# Patient Record
Sex: MALE | Race: WHITE | NOT HISPANIC OR LATINO | URBAN - NONMETROPOLITAN AREA
[De-identification: names, ages, dates, MRNs, and addresses within clinical notes are randomized per-mention and may not be internally consistent; named-entity substitution may affect disease eponyms.]

---

## 2017-11-11 ENCOUNTER — HOSPITAL ENCOUNTER (OUTPATIENT)
Dept: LAB | Facility: MEDICAL CENTER | Age: 67
End: 2017-11-11
Attending: INTERNAL MEDICINE
Payer: MEDICARE

## 2017-11-11 LAB
ALBUMIN SERPL BCP-MCNC: 4 G/DL (ref 3.2–4.9)
ALBUMIN/GLOB SERPL: 1.5 G/DL
ALP SERPL-CCNC: 45 U/L (ref 30–99)
ALT SERPL-CCNC: 18 U/L (ref 2–50)
ANION GAP SERPL CALC-SCNC: 7 MMOL/L (ref 0–11.9)
AST SERPL-CCNC: 17 U/L (ref 12–45)
BILIRUB SERPL-MCNC: 0.5 MG/DL (ref 0.1–1.5)
BUN SERPL-MCNC: 19 MG/DL (ref 8–22)
CALCIUM SERPL-MCNC: 8.8 MG/DL (ref 8.5–10.5)
CHLORIDE SERPL-SCNC: 106 MMOL/L (ref 96–112)
CHOLEST SERPL-MCNC: 173 MG/DL (ref 100–199)
CO2 SERPL-SCNC: 26 MMOL/L (ref 20–33)
CREAT SERPL-MCNC: 0.88 MG/DL (ref 0.5–1.4)
GFR SERPL CREATININE-BSD FRML MDRD: >60 ML/MIN/1.73 M 2
GLOBULIN SER CALC-MCNC: 2.6 G/DL (ref 1.9–3.5)
GLUCOSE SERPL-MCNC: 105 MG/DL (ref 65–99)
HCV AB SER QL: NEGATIVE
HDLC SERPL-MCNC: 63 MG/DL
LDLC SERPL CALC-MCNC: 80 MG/DL
POTASSIUM SERPL-SCNC: 4.1 MMOL/L (ref 3.6–5.5)
PROT SERPL-MCNC: 6.6 G/DL (ref 6–8.2)
SODIUM SERPL-SCNC: 139 MMOL/L (ref 135–145)
TRIGL SERPL-MCNC: 149 MG/DL (ref 0–149)

## 2017-11-11 PROCEDURE — 80053 COMPREHEN METABOLIC PANEL: CPT

## 2017-11-11 PROCEDURE — 36415 COLL VENOUS BLD VENIPUNCTURE: CPT

## 2017-11-11 PROCEDURE — 86803 HEPATITIS C AB TEST: CPT

## 2017-11-11 PROCEDURE — 80061 LIPID PANEL: CPT

## 2018-08-07 ENCOUNTER — APPOINTMENT (OUTPATIENT)
Dept: RADIOLOGY | Facility: IMAGING CENTER | Age: 68
End: 2018-08-07
Attending: ORTHOPAEDIC SURGERY
Payer: MEDICARE

## 2018-08-07 ENCOUNTER — NON-PROVIDER VISIT (OUTPATIENT)
Dept: URGENT CARE | Facility: PHYSICIAN GROUP | Age: 68
End: 2018-08-07
Payer: MEDICARE

## 2018-08-07 ENCOUNTER — HOSPITAL ENCOUNTER (OUTPATIENT)
Dept: LAB | Facility: MEDICAL CENTER | Age: 68
End: 2018-08-07
Attending: ORTHOPAEDIC SURGERY
Payer: MEDICARE

## 2018-08-07 DIAGNOSIS — M54.5 LOW BACK PAIN, UNSPECIFIED BACK PAIN LATERALITY, UNSPECIFIED CHRONICITY, WITH SCIATICA PRESENCE UNSPECIFIED: ICD-10-CM

## 2018-08-07 LAB
APPEARANCE UR: CLEAR
APTT PPP: 26.2 SEC (ref 24.7–36)
BILIRUB UR QL STRIP.AUTO: NEGATIVE
COLOR UR: YELLOW
GLUCOSE UR STRIP.AUTO-MCNC: NEGATIVE MG/DL
INR PPP: 0.99 (ref 0.87–1.13)
KETONES UR STRIP.AUTO-MCNC: NEGATIVE MG/DL
LEUKOCYTE ESTERASE UR QL STRIP.AUTO: NEGATIVE
MICRO URNS: NORMAL
NITRITE UR QL STRIP.AUTO: NEGATIVE
PH UR STRIP.AUTO: 5.5 [PH]
PROT UR QL STRIP: NEGATIVE MG/DL
PROTHROMBIN TIME: 12.8 SEC (ref 12–14.6)
RBC UR QL AUTO: NEGATIVE
SP GR UR STRIP.AUTO: 1.02
UROBILINOGEN UR STRIP.AUTO-MCNC: 0.2 MG/DL

## 2018-08-07 PROCEDURE — 36415 COLL VENOUS BLD VENIPUNCTURE: CPT | Mod: GA

## 2018-08-07 PROCEDURE — 81003 URINALYSIS AUTO W/O SCOPE: CPT

## 2018-08-07 PROCEDURE — 85610 PROTHROMBIN TIME: CPT | Mod: GA

## 2018-08-07 PROCEDURE — 85730 THROMBOPLASTIN TIME PARTIAL: CPT | Mod: GA

## 2018-08-07 PROCEDURE — 71046 X-RAY EXAM CHEST 2 VIEWS: CPT | Mod: TC,FY | Performed by: NURSE PRACTITIONER

## 2018-08-20 ENCOUNTER — APPOINTMENT (OUTPATIENT)
Dept: ADMISSIONS | Facility: MEDICAL CENTER | Age: 68
End: 2018-08-20
Attending: ORTHOPAEDIC SURGERY
Payer: MEDICARE

## 2018-08-20 RX ORDER — SIMVASTATIN 40 MG
40 TABLET ORAL NIGHTLY
COMMUNITY
End: 2022-11-14 | Stop reason: SDUPTHER

## 2018-08-20 RX ORDER — LISINOPRIL 40 MG/1
40 TABLET ORAL EVERY EVENING
COMMUNITY
End: 2022-09-13

## 2018-08-20 RX ORDER — DOXAZOSIN MESYLATE 4 MG/1
4 TABLET ORAL EVERY EVENING
COMMUNITY
End: 2022-11-14 | Stop reason: SDUPTHER

## 2018-08-21 ENCOUNTER — APPOINTMENT (OUTPATIENT)
Dept: RADIOLOGY | Facility: MEDICAL CENTER | Age: 68
End: 2018-08-21
Attending: ORTHOPAEDIC SURGERY
Payer: MEDICARE

## 2018-08-21 ENCOUNTER — HOSPITAL ENCOUNTER (OUTPATIENT)
Facility: MEDICAL CENTER | Age: 68
End: 2018-08-21
Attending: ORTHOPAEDIC SURGERY | Admitting: ORTHOPAEDIC SURGERY
Payer: MEDICARE

## 2018-08-21 VITALS
BODY MASS INDEX: 28.55 KG/M2 | RESPIRATION RATE: 14 BRPM | WEIGHT: 222.44 LBS | HEART RATE: 69 BPM | TEMPERATURE: 97.9 F | HEIGHT: 74 IN | OXYGEN SATURATION: 92 %

## 2018-08-21 PROCEDURE — 500367 HCHG DRAIN KIT, HEMOVAC: Performed by: ORTHOPAEDIC SURGERY

## 2018-08-21 PROCEDURE — 500440 HCHG DRESSING, STERILE ROLL (KERLIX): Performed by: ORTHOPAEDIC SURGERY

## 2018-08-21 PROCEDURE — 160025 RECOVERY II MINUTES (STATS): Performed by: ORTHOPAEDIC SURGERY

## 2018-08-21 PROCEDURE — 500885 HCHG PACK, JACKSON TABLE: Performed by: ORTHOPAEDIC SURGERY

## 2018-08-21 PROCEDURE — 160002 HCHG RECOVERY MINUTES (STAT): Performed by: ORTHOPAEDIC SURGERY

## 2018-08-21 PROCEDURE — 700101 HCHG RX REV CODE 250

## 2018-08-21 PROCEDURE — A9270 NON-COVERED ITEM OR SERVICE: HCPCS

## 2018-08-21 PROCEDURE — 501838 HCHG SUTURE GENERAL: Performed by: ORTHOPAEDIC SURGERY

## 2018-08-21 PROCEDURE — 160029 HCHG SURGERY MINUTES - 1ST 30 MINS LEVEL 4: Performed by: ORTHOPAEDIC SURGERY

## 2018-08-21 PROCEDURE — 700102 HCHG RX REV CODE 250 W/ 637 OVERRIDE(OP)

## 2018-08-21 PROCEDURE — 160036 HCHG PACU - EA ADDL 30 MINS PHASE I: Performed by: ORTHOPAEDIC SURGERY

## 2018-08-21 PROCEDURE — 160047 HCHG PACU  - EA ADDL 30 MINS PHASE II: Performed by: ORTHOPAEDIC SURGERY

## 2018-08-21 PROCEDURE — A6402 STERILE GAUZE <= 16 SQ IN: HCPCS | Performed by: ORTHOPAEDIC SURGERY

## 2018-08-21 PROCEDURE — 72020 X-RAY EXAM OF SPINE 1 VIEW: CPT

## 2018-08-21 PROCEDURE — 500424 HCHG DRESSING, AIRSTRIP: Performed by: ORTHOPAEDIC SURGERY

## 2018-08-21 PROCEDURE — 160046 HCHG PACU - 1ST 60 MINS PHASE II: Performed by: ORTHOPAEDIC SURGERY

## 2018-08-21 PROCEDURE — 502240 HCHG MISC OR SUPPLY RC 0272: Performed by: ORTHOPAEDIC SURGERY

## 2018-08-21 PROCEDURE — 160041 HCHG SURGERY MINUTES - EA ADDL 1 MIN LEVEL 4: Performed by: ORTHOPAEDIC SURGERY

## 2018-08-21 PROCEDURE — 700111 HCHG RX REV CODE 636 W/ 250 OVERRIDE (IP)

## 2018-08-21 PROCEDURE — 110454 HCHG SHELL REV 250: Performed by: ORTHOPAEDIC SURGERY

## 2018-08-21 PROCEDURE — 160048 HCHG OR STATISTICAL LEVEL 1-5: Performed by: ORTHOPAEDIC SURGERY

## 2018-08-21 PROCEDURE — 160035 HCHG PACU - 1ST 60 MINS PHASE I: Performed by: ORTHOPAEDIC SURGERY

## 2018-08-21 PROCEDURE — 160009 HCHG ANES TIME/MIN: Performed by: ORTHOPAEDIC SURGERY

## 2018-08-21 RX ORDER — OXYCODONE HCL 5 MG/5 ML
SOLUTION, ORAL ORAL
Status: COMPLETED
Start: 2018-08-21 | End: 2018-08-21

## 2018-08-21 RX ORDER — LIDOCAINE HYDROCHLORIDE 10 MG/ML
0.5 INJECTION, SOLUTION INFILTRATION; PERINEURAL
Status: DISCONTINUED | OUTPATIENT
Start: 2018-08-21 | End: 2018-08-21 | Stop reason: HOSPADM

## 2018-08-21 RX ORDER — ACETAMINOPHEN 500 MG
TABLET ORAL
Status: COMPLETED
Start: 2018-08-21 | End: 2018-08-21

## 2018-08-21 RX ORDER — CELECOXIB 200 MG/1
CAPSULE ORAL
Status: COMPLETED
Start: 2018-08-21 | End: 2018-08-21

## 2018-08-21 RX ORDER — LIDOCAINE HYDROCHLORIDE 10 MG/ML
INJECTION, SOLUTION INFILTRATION; PERINEURAL
Status: COMPLETED
Start: 2018-08-21 | End: 2018-08-21

## 2018-08-21 RX ORDER — BUPIVACAINE HYDROCHLORIDE AND EPINEPHRINE 2.5; 5 MG/ML; UG/ML
INJECTION, SOLUTION EPIDURAL; INFILTRATION; INTRACAUDAL; PERINEURAL
Status: DISCONTINUED | OUTPATIENT
Start: 2018-08-21 | End: 2018-08-21 | Stop reason: HOSPADM

## 2018-08-21 RX ORDER — SODIUM CHLORIDE, SODIUM LACTATE, POTASSIUM CHLORIDE, CALCIUM CHLORIDE 600; 310; 30; 20 MG/100ML; MG/100ML; MG/100ML; MG/100ML
INJECTION, SOLUTION INTRAVENOUS CONTINUOUS
Status: DISCONTINUED | OUTPATIENT
Start: 2018-08-21 | End: 2018-08-21 | Stop reason: HOSPADM

## 2018-08-21 RX ADMIN — LIDOCAINE HYDROCHLORIDE 0.5 ML: 10 INJECTION, SOLUTION INFILTRATION; PERINEURAL at 09:19

## 2018-08-21 RX ADMIN — ACETAMINOPHEN 1000 MG: 500 TABLET, FILM COATED ORAL at 09:19

## 2018-08-21 RX ADMIN — SODIUM CHLORIDE, SODIUM LACTATE, POTASSIUM CHLORIDE, CALCIUM CHLORIDE: 600; 310; 30; 20 INJECTION, SOLUTION INTRAVENOUS at 09:19

## 2018-08-21 RX ADMIN — CELECOXIB 400 MG: 200 CAPSULE ORAL at 09:19

## 2018-08-21 RX ADMIN — OXYCODONE HYDROCHLORIDE 10 MG: 5 SOLUTION ORAL at 12:29

## 2018-08-21 ASSESSMENT — PAIN SCALES - GENERAL
PAINLEVEL_OUTOF10: 3
PAINLEVEL_OUTOF10: 3
PAINLEVEL_OUTOF10: 6
PAINLEVEL_OUTOF10: 3
PAINLEVEL_OUTOF10: 4
PAINLEVEL_OUTOF10: 0
PAINLEVEL_OUTOF10: 6
PAINLEVEL_OUTOF10: 0

## 2018-08-21 NOTE — OR NURSING
Dressing reinforced. Drainage saturating thru reinforced dressing and onto the bed linens. Spoke with Dr Starr via phone, he orderd a dressing change. Dressing changed. Pt up to bathroom and ambulating well. Pt void x 1. Bleeding stopped.

## 2018-08-21 NOTE — OP REPORT
DATE OF SERVICE:  08/21/2018    PREOPERATIVE DIAGNOSES:  Stenosis with radiculopathy at L3, stenosis with   radiculopathy at L4.    POSTOPERATIVE DIAGNOSES:  Stenosis with radiculopathy at L3, stenosis with   radiculopathy at L4.    PROCEDURES PERFORMED:  Laminectomy and facetectomy at L3; laminectomy and   facetectomy at L4.    SURGEON:  Laxmi Starr MD    ASSISTANT:  ELIZABETH Sibley    OPERATIVE PROCEDURE:  The patient was taken to operating room where general   anesthesia was administered by mask.  The patient was well anesthetized,   endotracheally intubated, placed prone on the OSI table.  Great care was taken   to make sure there was no pressure on the orbits.  The arms were not abducted   past 90 degrees.  The marked interspace spinous process of L3 pointed towards   the L3-L4 disk.  Needle was curved subcutaneously.  Lumbar spine was prepped   and draped in the usual sterile fashion.  Ancef administered intravenously   prior to skin incision.  Skin was incised after lidocaine over the L3-L4 area   to expose the fascia.  Further lidocaine injected both right and left sides.    Using Astorga elevator and electrocautery, the rectal muscle was dissected off   the laminas of L3-L4.  Intraoperative x-ray confirmed the appropriate level.    The spinous process of L3, partially of L4, partially of L2 was harvested and   was removed.  The lamina was thinned with a high-speed bur, then using the   Misonix bone scalpel, the lamina was removed off the medial wall and cephalad   of the L3 pedicle, medial and inferior wall of the L4 pedicle.  Decompression   was carried out with 3 and 4 mm Kerrisons.  The wound was copiously irrigated.    Vancomycin powder placed in the wound for local antibiotic control.  The   fascia was reapproximated with 0 Vicryl, subQ with 2-0 Vicryl, skin closed   with 4-0 Vicryl.  Prior to closure, the wound was infiltrated with Marcaine.    The patient tolerated the procedure well.  The patient  was taken to recovery   room in stable condition.  Counts reported as correct.       ____________________________________     MD MIMI PRIEST / YOVANI    DD:  08/21/2018 11:59:25  DT:  08/21/2018 12:19:18    D#:  [id number]  Job#:  [id number]    cc: MIKAYLA JOHNSON

## 2018-08-21 NOTE — OR SURGEON
Immediate Post OP Note    PreOp Diagnosis: stenosis L3-4  PostOp Diagnosis: same    Procedure(s):  LUMBAR DECOMPRESSION- POSTERIOR L3-4 - Wound Class: Clean    Surgeon(s):  Laxmi Starr M.D.    Anesthesiologist/Type of Anesthesia:  Anesthesiologist: Morgan Wylie/General    Surgical Staff:  Circulator: Jada Leach R.N.  Relief Circulator: Iza Chung R.N.  Relief Scrub: Johnathon Geller  Scrub Person: Adali Tran    Specimens removed if any none    Estimated Blood Loss: 30cc    Findings: stenosis    Complications: none        8/21/2018 12:07 PM Laxmi Starr M.D.

## 2018-08-21 NOTE — DISCHARGE INSTRUCTIONS
ACTIVITY: Rest and take it easy for the first 24 hours.  A responsible adult is recommended to remain with you during that time.  It is normal to feel sleepy.  We encourage you to not do anything that requires balance, judgment or coordination.    MILD FLU-LIKE SYMPTOMS ARE NORMAL. YOU MAY EXPERIENCE GENERALIZED MUSCLE ACHES, THROAT IRRITATION, HEADACHE AND/OR SOME NAUSEA.    FOR 24 HOURS DO NOT:  Drive, operate machinery or run household appliances.  Drink beer or alcoholic beverages.   Make important decisions or sign legal documents.    SPECIAL INSTRUCTIONS:   May shower with waterproof dressing on. Remove dressing 4 days postoperatively.  Follow up with Dr. Starr as scheduled.    Surgical Spinal Decompression, Care After  Introduction  Refer to this sheet in the next few weeks. These instructions provide you with information about caring for yourself after your procedure. Your health care provider may also give you more specific instructions. Your treatment has been planned according to current medical practices, but problems sometimes occur. Call your health care provider if you have any problems or questions after your procedure.  What can I expect after the procedure?  It is common to have pain for the first few days after the procedure. Some people continue to have mild pain even after making a full recovery.  Follow these instructions at home:  Medicine  · Take medicines only as directed by your health care provider.  · Avoid taking over-the-counter pain medicines unless your health care provider tells you otherwise. These medicines interfere with the development and growth of new bone cells.  · If you were prescribed a narcotic pain medicine, take it exactly as told by your health care provider.  ¨ Do not drink alcohol while on the medicine.  ¨ Do not drive while on the medicine.  Injury care  · Care for your back brace as told by your health care provider.  · If directed, apply ice to the injured  area:  ¨ Put ice in a plastic bag.  ¨ Place a towel between your skin and the bag.  ¨ Leave the ice on for 20 minutes, 2-3 times a day.  Activity  · Perform physical therapy exercises as told by your health care provider.  · Exercise regularly. Start by taking short walks. Slowly increase your activity level over time. Gentle exercise helps to ease pain.  · Sit, stand, walk, turn in bed, and reposition yourself as told by your health care provider. This will help to keep your spine in proper alignment.  · Avoid bending and twisting your body.  · Avoid doing strenuous household chores, such as vacuuming.  · Do not lift anything that is heavier than 10 lb (4.5 kg).  Other Instructions  · Keep all follow-up visits as directed by your health care provider. This is important.  · Do not use any tobacco products, including cigarettes, chewing tobacco, or electronic cigarettes. If you need help quitting, ask your health care provider. Nicotine affects the way bones heal.  Contact a health care provider if:  · Your pain gets worse.  · You have a fever.  · You have redness, swelling, or pain at the site of your incision.  · You have fluid, blood, or pus coming from your incision.  · You have numbness, tingling, or weakness in any part of your body.  Get help right away if:  · Your incision feels swollen and tender, and the surrounding area looks like a lump. The lump may be red or bluish in color.  · You cannot move any part of your body (paralysis).  · You cannot control your bladder or bowels.  This information is not intended to replace advice given to you by your health care provider. Make sure you discuss any questions you have with your health care provider.  Document Released: 05/03/2016 Document Revised: 05/25/2017 Document Reviewed: 12/21/2015  © 2017 Elsevier      DIET: To avoid nausea, slowly advance diet as tolerated, avoiding spicy or greasy foods for the first day.  Add more substantial food to your diet  according to your physician's instructions  INCREASE FLUIDS AND FIBER TO AVOID CONSTIPATION.    SURGICAL DRESSING/BATHING:   Okay to shower. No hot tubs, baths, soaking until cleared by doctor.    FOLLOW-UP APPOINTMENT:  A follow-up appointment should be arranged with your doctor in 1-2 weeks; call to schedule.    You should CALL YOUR PHYSICIAN if you develop:  Fever greater than 101 degrees F.  Pain not relieved by medication, or persistent nausea or vomiting.  Excessive bleeding (blood soaking through dressing) or unexpected drainage from the wound.  Extreme redness or swelling around the incision site, drainage of pus or foul smelling drainage.  Inability to urinate or empty your bladder within 8 hours.  Problems with breathing or chest pain.    You should call 911 if you develop problems with breathing or chest pain.  If you are unable to contact your doctor or surgical center, you should go to the nearest emergency room or urgent care center.  Physician's telephone #: Dr. Starr [phone]    If any questions arise, call your doctor.  If your doctor is not available, please feel free to call the Surgical Center at [phone].  The Center is open Monday through Friday from 7AM to 7PM.  You can also call the [email or web address] HOTLINE open 24 hours/day, 7 days/week and speak to a nurse at [phone], or toll free at [phone].    A registered nurse may call you a few days after your surgery to see how you are doing after your procedure.    MEDICATIONS: Resume taking daily medication.  Take prescribed pain medication with food.  If no medication is prescribed, you may take non-aspirin pain medication if needed.  PAIN MEDICATION CAN BE VERY CONSTIPATING.  Take a stool softener or laxative such as senokot, pericolace, or milk of magnesia if needed.    Prescription given for Norco (pain medication), Zofran (anti-nausea medication), Keflex (antibiotic).  Last pain medication given at 12:29 pm (next dose available  __________.)    If your physician has prescribed pain medication that includes Acetaminophen (Tylenol), do not take additional Acetaminophen (Tylenol) while taking the prescribed medication.    Depression / Suicide Risk    As you are discharged from this [facility] facility, it is important to learn how to keep safe from harming yourself.    Recognize the warning signs:  · Abrupt changes in personality, positive or negative- including increase in energy   · Giving away possessions  · Change in eating patterns- significant weight changes-  positive or negative  · Change in sleeping patterns- unable to sleep or sleeping all the time   · Unwillingness or inability to communicate  · Depression  · Unusual sadness, discouragement and loneliness  · Talk of wanting to die  · Neglect of personal appearance   · Rebelliousness- reckless behavior  · Withdrawal from people/activities they love  · Confusion- inability to concentrate     If you or a loved one observes any of these behaviors or has concerns about self-harm, here's what you can do:  · Talk about it- your feelings and reasons for harming yourself  · Remove any means that you might use to hurt yourself (examples: pills, rope, extension cords, firearm)  · Get professional help from the community (Mental Health, Substance Abuse, psychological counseling)  · Do not be alone:Call your Safe Contact- someone whom you trust who will be there for you.  · Call your local CRISIS HOTLINE 113-9796 or 489-613-4302  · Call your local Children's Mobile Crisis Response Team [place] (946) 584-3033 or www.PROnoise  · Call the toll free National Suicide Prevention Hotlines   · National Suicide Prevention Lifeline 154-924-UDEV (1126)  · National Hope Line Network 800-SUICIDE (402-9803)

## 2018-08-21 NOTE — OP REPORT
DATE OF SERVICE:  08/21/2018    PREOPERATIVE DIAGNOSES:  Stenosis with radiculopathy at L3 and stenosis with   radiculopathy at L3.    POSTOPERATIVE DIAGNOSIS:  Stenosis with radiculopathy at L3 and stenosis with   radiculopathy at L3.    PROCEDURES PERFORMED:  Laminectomy and facetectomy at L3; laminectomy and   facetectomy at L4.    SURGEON:  Laxmi Starr MD    ASSISTANT:  ELIZABETH Sibley    OPERATIVE PROCEDURE:  The patient was taken to the operating room where   general anesthesia was administered by mask.  The patient was well   anesthetized, endotracheally intubated, placed prone on the OSI table.  Great   care was taken to make sure there was no pressure on the orbits.  The arms   were not abducted past 90 degrees.  The elbows gently flexed with no pressure   on the ulnar nerve.  The lumbar spine was prepped initially with ChloraPrep   and marked interspace spinous process of L3 towards the L3-L4 disk.  The   needle was curved subcutaneously using a guide for the incision.  The lumbar   spine was then prepped and draped in the usual sterile fashion.  Ancef was   administered intravenously prior to skin incision.  Skin was incised after   infiltrating lidocaine over the L3-L4 area to expose the fascia.  Further   lidocaine injected on both the right and left sides.  Using Astorga elevator and   electrocautery, a subperiosteal dissection of rectus spinal muscles at laminas   of L3 and L4 was performed.  Intraoperative x-ray confirmed the appropriate   level.  ____ was removed.  The spinous process of L3, partially of L4, and L2   was removed.    DICTATION ENDS HERE       ____________________________________     MD MIMI PRIEST / YOVANI    DD:  08/21/2018 11:55:18  DT:  08/21/2018 12:11:42    D#:  [id number]  Job#:  [id number]    cc: MIKAYLA JOHNSON

## 2018-08-21 NOTE — PROGRESS NOTES
The Medication Reconciliation process has been completed by interviewing the patient    Allergies have been reviewed  Antibiotic use in 30 days - none    Home Pharmacy:  [facility]

## 2018-08-21 NOTE — OR NURSING
1205: received to PACU via gurney. Awake. Denies any pain or any numbness to all extremities. Able to move all extremities. Lumbar dressing CDI.  1229: c/o pain. Pain scale 6/10. Medicated. See MAR. Sips of water given. Tolerated well.  1300: pain scale 3/10 and tolerable.  1320: report called to Anabela ROSE  1322: transported via gurney to PACU 2. Stable.

## 2018-08-21 NOTE — OR NURSING
Discharge information reviewed with patient and responsible adult. No questions or concerns at this time.     IV discontinued. Dressing CDI.     See vital sign flowsheets  for discharge details.

## 2019-06-18 ENCOUNTER — HOSPITAL ENCOUNTER (OUTPATIENT)
Dept: LAB | Facility: MEDICAL CENTER | Age: 69
End: 2019-06-18
Attending: NURSE PRACTITIONER
Payer: MEDICARE

## 2019-06-18 LAB
ALBUMIN SERPL BCP-MCNC: 4.1 G/DL (ref 3.2–4.9)
ALBUMIN/GLOB SERPL: 1.9 G/DL
ALP SERPL-CCNC: 48 U/L (ref 30–99)
ALT SERPL-CCNC: 18 U/L (ref 2–50)
ANION GAP SERPL CALC-SCNC: 10 MMOL/L (ref 0–11.9)
AST SERPL-CCNC: 20 U/L (ref 12–45)
BILIRUB SERPL-MCNC: 0.6 MG/DL (ref 0.1–1.5)
BUN SERPL-MCNC: 10 MG/DL (ref 8–22)
CALCIUM SERPL-MCNC: 9.3 MG/DL (ref 8.5–10.5)
CHLORIDE SERPL-SCNC: 106 MMOL/L (ref 96–112)
CHOLEST SERPL-MCNC: 224 MG/DL (ref 100–199)
CO2 SERPL-SCNC: 25 MMOL/L (ref 20–33)
CREAT SERPL-MCNC: 0.94 MG/DL (ref 0.5–1.4)
FASTING STATUS PATIENT QL REPORTED: NORMAL
GLOBULIN SER CALC-MCNC: 2.2 G/DL (ref 1.9–3.5)
GLUCOSE SERPL-MCNC: 98 MG/DL (ref 65–99)
HDLC SERPL-MCNC: 52 MG/DL
LDLC SERPL CALC-MCNC: 119 MG/DL
POTASSIUM SERPL-SCNC: 4.5 MMOL/L (ref 3.6–5.5)
PROT SERPL-MCNC: 6.3 G/DL (ref 6–8.2)
SODIUM SERPL-SCNC: 141 MMOL/L (ref 135–145)
TRIGL SERPL-MCNC: 264 MG/DL (ref 0–149)

## 2019-06-18 PROCEDURE — 80053 COMPREHEN METABOLIC PANEL: CPT

## 2019-06-18 PROCEDURE — 80061 LIPID PANEL: CPT

## 2019-06-18 PROCEDURE — 36415 COLL VENOUS BLD VENIPUNCTURE: CPT

## 2019-11-26 ENCOUNTER — HOSPITAL ENCOUNTER (OUTPATIENT)
Dept: LAB | Facility: MEDICAL CENTER | Age: 69
End: 2019-11-26
Attending: INTERNAL MEDICINE
Payer: MEDICARE

## 2019-11-26 LAB
CHOLEST SERPL-MCNC: 267 MG/DL (ref 100–199)
FASTING STATUS PATIENT QL REPORTED: NORMAL
HDLC SERPL-MCNC: 54 MG/DL
LDLC SERPL CALC-MCNC: 147 MG/DL
TRIGL SERPL-MCNC: 332 MG/DL (ref 0–149)

## 2019-11-26 PROCEDURE — 80061 LIPID PANEL: CPT

## 2019-11-26 PROCEDURE — 36415 COLL VENOUS BLD VENIPUNCTURE: CPT

## 2020-04-07 NOTE — OR NURSING
[facility]                        OCCUPATIONAL HEALTH SERVICES                       OCCUPATIONAL MEDICINE REPORT    EMPLOYEE NAME: Alo Sommer            YOB: 1987  MED. REC. #: [id number]             DATE OF VISIT: 4/7/2020   DATE OF INJURY:   EMPLOYER:   PROVIDER: Dr. Hayder Aguilar MD    EMPLOYEE STATEMENT:  The patient is here for follow-up regarding Laceration of forehead     HISTORY:  Patient was seen in the immediate care yesterday after an object struck him in the forehead causing a wound.  Dermabond was placed on the wound and he had a tetanus booster.  He indicates he is having no problems with the wound at all.  He is having no pain whatsoever.  He denies headache or neck pain.  He is otherwise well.    SIGNIFICANT FINDINGS:  Visit Vitals  /78   Pulse 69     Patient is well developed, well nourished, awake, alert, and does not appear in any distress.  HEENT: PERRLA, EOM's intact, the wound appears to be healing without evidence of infection.  There is no erythema, warmth, swelling, or discharge.  Neck: supple and non-tender  Lungs: clear and equal, no wheezes, rales, or rhonchi noted  Heart: regular rate and rhythm, chest wall not tender    DIAGNOSIS:   1. Laceration of forehead, subsequent encounter        CONDITION: Healing without evidence of infection.    TREATMENT AND PLAN:  MEDICATION: No medications given this visit.  PHYSICAL THERAPY:   PROCEDURE:   OTHER:     WORK STATUS: Return to full unrestricted duty.    NEXT APPOINTMENT: None    DISABILITY: None    EMPLOYER:  The above medical report is a summary of your employee's recent clinic visit at [facility].  If you have any questions concerning the treatment of your employee, please contact us at [phone].     Report given to MILTON Shah. Patient resting in bed with wife at bedside. Instructed to use call light if he needs any assistance. Pain level tolerable.

## 2020-10-04 ENCOUNTER — HOSPITAL ENCOUNTER (OUTPATIENT)
Facility: MEDICAL CENTER | Age: 70
End: 2020-10-04
Attending: PHYSICIAN ASSISTANT
Payer: MEDICARE

## 2020-10-04 ENCOUNTER — OFFICE VISIT (OUTPATIENT)
Dept: URGENT CARE | Facility: PHYSICIAN GROUP | Age: 70
End: 2020-10-04
Payer: MEDICARE

## 2020-10-04 VITALS
WEIGHT: 219 LBS | HEIGHT: 74 IN | TEMPERATURE: 97.6 F | HEART RATE: 68 BPM | RESPIRATION RATE: 16 BRPM | DIASTOLIC BLOOD PRESSURE: 78 MMHG | SYSTOLIC BLOOD PRESSURE: 122 MMHG | OXYGEN SATURATION: 98 % | BODY MASS INDEX: 28.11 KG/M2

## 2020-10-04 DIAGNOSIS — Z20.822 EXPOSURE TO COVID-19 VIRUS: ICD-10-CM

## 2020-10-04 PROCEDURE — U0003 INFECTIOUS AGENT DETECTION BY NUCLEIC ACID (DNA OR RNA); SEVERE ACUTE RESPIRATORY SYNDROME CORONAVIRUS 2 (SARS-COV-2) (CORONAVIRUS DISEASE [COVID-19]), AMPLIFIED PROBE TECHNIQUE, MAKING USE OF HIGH THROUGHPUT TECHNOLOGIES AS DESCRIBED BY CMS-2020-01-R: HCPCS

## 2020-10-04 PROCEDURE — 99203 OFFICE O/P NEW LOW 30 MIN: CPT | Mod: CS | Performed by: PHYSICIAN ASSISTANT

## 2020-10-04 NOTE — PROGRESS NOTES
Chief Complaint   Patient presents with   • Other     COVID test        HISTORY OF PRESENT ILLNESS: Patient is a 69 y.o. male who presents today for the following:    Patient comes in for evaluation of possible COVID.  His wife developed a fever yesterday and he would like to be tested as well.  He denies fever, body aches, chills, cough, shortness of breath.    There are no active problems to display for this patient.      Allergies:Patient has no known allergies.    Current Outpatient Medications Ordered in Epic   Medication Sig Dispense Refill   • mupirocin (BACTROBAN) 2 % Ointment Apply 1 Application to affected area(s) 2 times a day. For 5 days     • lisinopril (PRINIVIL, ZESTRIL) 40 MG tablet Take 40 mg by mouth every evening.     • simvastatin (ZOCOR) 40 MG Tab Take 40 mg by mouth every evening.     • doxazosin (CARDURA) 4 MG Tab Take 4 mg by mouth every evening.       No current Epic-ordered facility-administered medications on file.        Past Medical History:   Diagnosis Date   • High cholesterol    • Hypertension    • Numbness and tingling     intermittent lower left leg   • Pain 2018    low back, bilateral legs       Social History     Tobacco Use   • Smoking status: Former Smoker     Packs/day: 0.50     Years: 26.00     Pack years: 13.00     Types: Cigarettes     Start date: 1988     Quit date:      Years since quittin.7   • Smokeless tobacco: Never Used   Substance Use Topics   • Alcohol use: Yes     Comment: 3-4 per dAY   • Drug use: No       No family status information on file.   History reviewed. No pertinent family history.    Review of Systems:    Constitutional ROS: No unexpected change in weight, No weakness, No fatigue  Eye ROS: No recent significant change in vision, No eye pain, redness, discharge  Ear ROS: No drainage, No tinnitus or vertigo, No recent change in hearing  Mouth/Throat ROS: No teeth or gum problems, No bleeding gums, No tongue complaints  Neck ROS: No  "swollen glands, No significant pain in neck  Pulmonary ROS: No chronic cough, sputum, or hemoptysis, No dyspnea on exertion, No wheezing  Cardiovascular ROS: No diaphoresis, No edema, No palpitations  Musculoskeletal/Extremities ROS: No peripheral edema, No pain, redness or swelling on the joints  Hematologic/Lymphatic ROS: No chills, No night sweats, No weight loss  Skin/Integumentary ROS: No edema, No evidence of rash, No itching      Exam:  /78   Pulse 68   Temp 36.4 °C (97.6 °F) (Temporal)   Resp 16   Ht 1.88 m (6' 2\")   Wt 99.3 kg (219 lb)   SpO2 98%   General: Well developed, well nourished. No distress.    HEENT: Head is grossly normal.  Pulmonary: Unlabored respiratory effort. Lungs clear to auscultation, no wheezes, no rhonchi.    Cardiovascular: Regular rate and rhythm without murmur.   Neurologic: Grossly nonfocal. No facial asymmetry noted.  Skin: Warm, dry, good turgor. No rashes in visible areas.   Psych: Normal mood. Alert and oriented to person, place and time.    Assessment/Plan:  We will contact patient with COVID results. Follow up for worsening or persistent symptoms.  1. Exposure to COVID-19 virus  COVID/SARS COV-2 PCR       "

## 2020-10-05 DIAGNOSIS — Z20.822 EXPOSURE TO COVID-19 VIRUS: ICD-10-CM

## 2020-10-05 LAB
COVID ORDER STATUS COVID19: NORMAL
SARS-COV-2 RNA RESP QL NAA+PROBE: NOTDETECTED
SPECIMEN SOURCE: NORMAL

## 2022-04-20 ENCOUNTER — HOSPITAL ENCOUNTER (OUTPATIENT)
Dept: LAB | Facility: MEDICAL CENTER | Age: 72
End: 2022-04-20
Attending: NURSE PRACTITIONER
Payer: MEDICARE

## 2022-04-20 LAB
ALBUMIN SERPL BCP-MCNC: 4.5 G/DL (ref 3.2–4.9)
ALBUMIN/GLOB SERPL: 2 G/DL
ALP SERPL-CCNC: 69 U/L (ref 30–99)
ALT SERPL-CCNC: 24 U/L (ref 2–50)
ANION GAP SERPL CALC-SCNC: 13 MMOL/L (ref 7–16)
AST SERPL-CCNC: 21 U/L (ref 12–45)
BASOPHILS # BLD AUTO: 0.8 % (ref 0–1.8)
BASOPHILS # BLD: 0.04 K/UL (ref 0–0.12)
BILIRUB SERPL-MCNC: 0.6 MG/DL (ref 0.1–1.5)
BUN SERPL-MCNC: 11 MG/DL (ref 8–22)
CALCIUM SERPL-MCNC: 8.8 MG/DL (ref 8.5–10.5)
CHLORIDE SERPL-SCNC: 107 MMOL/L (ref 96–112)
CHOLEST SERPL-MCNC: 200 MG/DL (ref 100–199)
CO2 SERPL-SCNC: 24 MMOL/L (ref 20–33)
CREAT SERPL-MCNC: 0.74 MG/DL (ref 0.5–1.4)
EOSINOPHIL # BLD AUTO: 0.14 K/UL (ref 0–0.51)
EOSINOPHIL NFR BLD: 2.9 % (ref 0–6.9)
ERYTHROCYTE [DISTWIDTH] IN BLOOD BY AUTOMATED COUNT: 45.6 FL (ref 35.9–50)
FASTING STATUS PATIENT QL REPORTED: NORMAL
GFR SERPLBLD CREATININE-BSD FMLA CKD-EPI: 97 ML/MIN/1.73 M 2
GLOBULIN SER CALC-MCNC: 2.2 G/DL (ref 1.9–3.5)
GLUCOSE SERPL-MCNC: 107 MG/DL (ref 65–99)
HCT VFR BLD AUTO: 44.8 % (ref 42–52)
HDLC SERPL-MCNC: 56 MG/DL
HGB BLD-MCNC: 15 G/DL (ref 14–18)
IMM GRANULOCYTES # BLD AUTO: 0.01 K/UL (ref 0–0.11)
IMM GRANULOCYTES NFR BLD AUTO: 0.2 % (ref 0–0.9)
LDLC SERPL CALC-MCNC: 112 MG/DL
LYMPHOCYTES # BLD AUTO: 1.85 K/UL (ref 1–4.8)
LYMPHOCYTES NFR BLD: 38.1 % (ref 22–41)
MCH RBC QN AUTO: 34.1 PG (ref 27–33)
MCHC RBC AUTO-ENTMCNC: 33.5 G/DL (ref 33.7–35.3)
MCV RBC AUTO: 101.8 FL (ref 81.4–97.8)
MONOCYTES # BLD AUTO: 0.51 K/UL (ref 0–0.85)
MONOCYTES NFR BLD AUTO: 10.5 % (ref 0–13.4)
NEUTROPHILS # BLD AUTO: 2.3 K/UL (ref 1.82–7.42)
NEUTROPHILS NFR BLD: 47.5 % (ref 44–72)
NRBC # BLD AUTO: 0 K/UL
NRBC BLD-RTO: 0 /100 WBC
PLATELET # BLD AUTO: 144 K/UL (ref 164–446)
PMV BLD AUTO: 10 FL (ref 9–12.9)
POTASSIUM SERPL-SCNC: 4.5 MMOL/L (ref 3.6–5.5)
PROT SERPL-MCNC: 6.7 G/DL (ref 6–8.2)
RBC # BLD AUTO: 4.4 M/UL (ref 4.7–6.1)
SODIUM SERPL-SCNC: 144 MMOL/L (ref 135–145)
TRIGL SERPL-MCNC: 159 MG/DL (ref 0–149)
WBC # BLD AUTO: 4.9 K/UL (ref 4.8–10.8)

## 2022-04-20 PROCEDURE — 80061 LIPID PANEL: CPT

## 2022-04-20 PROCEDURE — 36415 COLL VENOUS BLD VENIPUNCTURE: CPT

## 2022-04-20 PROCEDURE — 84153 ASSAY OF PSA TOTAL: CPT

## 2022-04-20 PROCEDURE — 80053 COMPREHEN METABOLIC PANEL: CPT

## 2022-04-20 PROCEDURE — 85025 COMPLETE CBC W/AUTO DIFF WBC: CPT

## 2022-04-21 LAB — PSA SERPL-MCNC: 2.29 NG/ML (ref 0–4)

## 2022-09-07 ENCOUNTER — TELEPHONE (OUTPATIENT)
Dept: SCHEDULING | Facility: IMAGING CENTER | Age: 72
End: 2022-09-07

## 2022-09-13 ENCOUNTER — OFFICE VISIT (OUTPATIENT)
Dept: MEDICAL GROUP | Facility: MEDICAL CENTER | Age: 72
End: 2022-09-13
Payer: MEDICARE

## 2022-09-13 VITALS
TEMPERATURE: 98.4 F | BODY MASS INDEX: 27.72 KG/M2 | HEIGHT: 74 IN | WEIGHT: 216 LBS | HEART RATE: 73 BPM | SYSTOLIC BLOOD PRESSURE: 164 MMHG | OXYGEN SATURATION: 95 % | DIASTOLIC BLOOD PRESSURE: 90 MMHG

## 2022-09-13 DIAGNOSIS — I10 PRIMARY HYPERTENSION: ICD-10-CM

## 2022-09-13 DIAGNOSIS — R73.03 PREDIABETES: ICD-10-CM

## 2022-09-13 DIAGNOSIS — N40.0 BENIGN PROSTATIC HYPERPLASIA WITHOUT LOWER URINARY TRACT SYMPTOMS: ICD-10-CM

## 2022-09-13 DIAGNOSIS — E78.5 HYPERLIPIDEMIA, UNSPECIFIED HYPERLIPIDEMIA TYPE: ICD-10-CM

## 2022-09-13 DIAGNOSIS — K42.9 UMBILICAL HERNIA WITHOUT OBSTRUCTION AND WITHOUT GANGRENE: ICD-10-CM

## 2022-09-13 DIAGNOSIS — F10.20 ALCOHOLISM (HCC): ICD-10-CM

## 2022-09-13 DIAGNOSIS — R53.83 OTHER FATIGUE: ICD-10-CM

## 2022-09-13 DIAGNOSIS — G62.9 NEUROPATHY: ICD-10-CM

## 2022-09-13 DIAGNOSIS — D75.89 MACROCYTOSIS: ICD-10-CM

## 2022-09-13 PROBLEM — R74.01 ELEVATED ALT MEASUREMENT: Status: ACTIVE | Noted: 2017-10-23

## 2022-09-13 PROCEDURE — 99214 OFFICE O/P EST MOD 30 MIN: CPT | Performed by: PHYSICIAN ASSISTANT

## 2022-09-13 RX ORDER — GABAPENTIN 100 MG/1
CAPSULE ORAL
Qty: 30 CAPSULE | Refills: 2 | Status: SHIPPED | OUTPATIENT
Start: 2022-09-13 | End: 2022-11-14

## 2022-09-13 RX ORDER — LOSARTAN POTASSIUM 50 MG/1
TABLET ORAL
COMMUNITY
Start: 2022-06-20 | End: 2022-11-14 | Stop reason: SDUPTHER

## 2022-09-13 ASSESSMENT — FIBROSIS 4 INDEX: FIB4 SCORE: 2.11

## 2022-09-13 NOTE — ASSESSMENT & PLAN NOTE
Chronic and unstable  3 years  Can not tolerate pregabalin  Has never tried gabapentin. Will try low dose

## 2022-09-13 NOTE — PROGRESS NOTES
"Subjective:   Balwinder Grimes is a 71 y.o. male here today for     HPI:    Establishing care  Patient is a 71 male who comes in with needing to establish care.  Reports bilateral peripheral not neuropathy that is not diabetes associated.  Has tried Lyrica in the past and states he hates how it made him feel.  Does note that he drinks at least 3 to 4 glasses of wine nightly.  Is trying to keep up to 3 glasses of wine.  Has familial tremor.  Oxybutynin IR  Has a history of hypertension and BPH for which she takes losartan, doxazosin 4.  He also has a history of hyperlipidemia for which he takes Zocor for.  Has no known side effects at this time      Current medicines (including changes today)  Current Outpatient Medications   Medication Sig Dispense Refill    gabapentin (NEURONTIN) 100 MG Cap Take 1 tablet by mouth as needed before bed for nerve pain 30 Capsule 2    losartan (COZAAR) 50 MG Tab       simvastatin (ZOCOR) 40 MG Tab Take 40 mg by mouth every evening.      doxazosin (CARDURA) 4 MG Tab Take 4 mg by mouth every evening.       No current facility-administered medications for this visit.     He  has a past medical history of High cholesterol, Hypertension, Numbness and tingling, and Pain (08/2018).  Patient has no known allergies.     Social History and Family History were reviewed and updated.    ROS   No headaches, chest pain, no shortness of breath, abdominal pain, nausea, or vomiting.  All other systems were reviewed and are negative or noted as positive in the HPI.       Objective:     BP (!) 164/90   Pulse 73   Temp 36.9 °C (98.4 °F) (Temporal)   Ht 1.88 m (6' 2\")   Wt 98 kg (216 lb)   SpO2 95%  Body mass index is 27.73 kg/m².     Physical Exam:  General: Patient appears well-nourished, well-hydrated, nontoxic  HEENT, normocephalic atraumatic, PERRLA, extraocular movements intact, nares are patent and clear  Neck: No visible masses, thyromegaly or abnormalities noted  Cardiovascular.  Sitting " comfortably without visible signs of edema  Lungs: No cyanosis noted, nondyspneic  Skin: Well perfused without evidence of rash or lesions  Neurological: Cranial nerves II through XII intact, normal gait  Musculoskeletal: Normal range of motion, normal strength and no deficit noted     Clinical Course/Lab Analysis:        Assessment and Plan:   The following treatment plan was discussed.  Signs and symptoms for which to return were discussed with patient at length.  Patient verbalized understanding.    Problem List Items Addressed This Visit       Primary hypertension     Chronic and stable  Takes losartan 50mg daily         Relevant Medications    losartan (COZAAR) 50 MG Tab    Benign prostatic hyperplasia without lower urinary tract symptoms     Chronic and stable  Takes doxazosin 4mg tablets daily\  No urinary symptoms         Hyperlipidemia     Chronic and stable  Takes 40mg daily         Relevant Medications    losartan (COZAAR) 50 MG Tab    Other Relevant Orders    Lipid Profile    Prediabetes     Sugars on April were 107.  Cut back glucose intake         Relevant Orders    HEMOGLOBIN A1C    Comp Metabolic Panel    Neuropathy     Chronic and unstable  3 years  Can not tolerate pregabalin  Has never tried gabapentin. Will try low dose         Relevant Medications    gabapentin (NEURONTIN) 100 MG Cap     Other Visit Diagnoses       Umbilical hernia without obstruction and without gangrene        Other fatigue        Relevant Orders    TSH WITH REFLEX TO FT4    Alcoholism (HCC)        Relevant Orders    VITAMIN B12    VITAMIN B6    FOLATE    Macrocytosis        Relevant Orders    VITAMIN B12    VITAMIN B6    FOLATE    CBC WITH DIFFERENTIAL               Followup: Follow-up within 4 to 8 weeks to go over her labs.  Also to see how gabapentin does with helping his neuropathy.  Will likely send to neurology though to make sure no underlying etiology.  Did discuss alcoholism and its effects and contribution to  symptoms as well.    Please note that this dictation was created using voice recognition software. I have made every reasonable attempt to correct obvious errors, but I expect that there are errors of grammar and possibly content that I did not discover before finalizing the note.

## 2022-11-01 ENCOUNTER — HOSPITAL ENCOUNTER (OUTPATIENT)
Dept: LAB | Facility: MEDICAL CENTER | Age: 72
End: 2022-11-01
Attending: PHYSICIAN ASSISTANT
Payer: MEDICARE

## 2022-11-01 DIAGNOSIS — E78.5 HYPERLIPIDEMIA, UNSPECIFIED HYPERLIPIDEMIA TYPE: ICD-10-CM

## 2022-11-01 DIAGNOSIS — F10.20 ALCOHOLISM (HCC): ICD-10-CM

## 2022-11-01 DIAGNOSIS — R73.03 PREDIABETES: ICD-10-CM

## 2022-11-01 DIAGNOSIS — D75.89 MACROCYTOSIS: ICD-10-CM

## 2022-11-01 DIAGNOSIS — R53.83 OTHER FATIGUE: ICD-10-CM

## 2022-11-01 LAB
ALBUMIN SERPL BCP-MCNC: 4.6 G/DL (ref 3.2–4.9)
ALBUMIN/GLOB SERPL: 1.7 G/DL
ALP SERPL-CCNC: 81 U/L (ref 30–99)
ALT SERPL-CCNC: 48 U/L (ref 2–50)
ANION GAP SERPL CALC-SCNC: 16 MMOL/L (ref 7–16)
AST SERPL-CCNC: 59 U/L (ref 12–45)
BASOPHILS # BLD AUTO: 1.2 % (ref 0–1.8)
BASOPHILS # BLD: 0.06 K/UL (ref 0–0.12)
BILIRUB SERPL-MCNC: 1.4 MG/DL (ref 0.1–1.5)
BUN SERPL-MCNC: 12 MG/DL (ref 8–22)
CALCIUM SERPL-MCNC: 9.2 MG/DL (ref 8.5–10.5)
CHLORIDE SERPL-SCNC: 99 MMOL/L (ref 96–112)
CHOLEST SERPL-MCNC: 218 MG/DL (ref 100–199)
CO2 SERPL-SCNC: 21 MMOL/L (ref 20–33)
CREAT SERPL-MCNC: 0.8 MG/DL (ref 0.5–1.4)
EOSINOPHIL # BLD AUTO: 0.12 K/UL (ref 0–0.51)
EOSINOPHIL NFR BLD: 2.3 % (ref 0–6.9)
ERYTHROCYTE [DISTWIDTH] IN BLOOD BY AUTOMATED COUNT: 43.4 FL (ref 35.9–50)
EST. AVERAGE GLUCOSE BLD GHB EST-MCNC: 114 MG/DL
FASTING STATUS PATIENT QL REPORTED: NORMAL
GFR SERPLBLD CREATININE-BSD FMLA CKD-EPI: 94 ML/MIN/1.73 M 2
GLOBULIN SER CALC-MCNC: 2.7 G/DL (ref 1.9–3.5)
GLUCOSE SERPL-MCNC: 122 MG/DL (ref 65–99)
HBA1C MFR BLD: 5.6 % (ref 4–5.6)
HCT VFR BLD AUTO: 44.3 % (ref 42–52)
HDLC SERPL-MCNC: 80 MG/DL
HGB BLD-MCNC: 15.2 G/DL (ref 14–18)
IMM GRANULOCYTES # BLD AUTO: 0.01 K/UL (ref 0–0.11)
IMM GRANULOCYTES NFR BLD AUTO: 0.2 % (ref 0–0.9)
LDLC SERPL CALC-MCNC: 122 MG/DL
LYMPHOCYTES # BLD AUTO: 1.68 K/UL (ref 1–4.8)
LYMPHOCYTES NFR BLD: 32.3 % (ref 22–41)
MCH RBC QN AUTO: 34.7 PG (ref 27–33)
MCHC RBC AUTO-ENTMCNC: 34.3 G/DL (ref 33.7–35.3)
MCV RBC AUTO: 101.1 FL (ref 81.4–97.8)
MONOCYTES # BLD AUTO: 0.54 K/UL (ref 0–0.85)
MONOCYTES NFR BLD AUTO: 10.4 % (ref 0–13.4)
NEUTROPHILS # BLD AUTO: 2.79 K/UL (ref 1.82–7.42)
NEUTROPHILS NFR BLD: 53.6 % (ref 44–72)
NRBC # BLD AUTO: 0 K/UL
NRBC BLD-RTO: 0 /100 WBC
PLATELET # BLD AUTO: 105 K/UL (ref 164–446)
PMV BLD AUTO: 9.6 FL (ref 9–12.9)
POTASSIUM SERPL-SCNC: 4.4 MMOL/L (ref 3.6–5.5)
PROT SERPL-MCNC: 7.3 G/DL (ref 6–8.2)
RBC # BLD AUTO: 4.38 M/UL (ref 4.7–6.1)
SODIUM SERPL-SCNC: 136 MMOL/L (ref 135–145)
TRIGL SERPL-MCNC: 78 MG/DL (ref 0–149)
WBC # BLD AUTO: 5.2 K/UL (ref 4.8–10.8)

## 2022-11-01 PROCEDURE — 36415 COLL VENOUS BLD VENIPUNCTURE: CPT

## 2022-11-01 PROCEDURE — 83036 HEMOGLOBIN GLYCOSYLATED A1C: CPT | Mod: GA

## 2022-11-01 PROCEDURE — 82746 ASSAY OF FOLIC ACID SERUM: CPT

## 2022-11-01 PROCEDURE — 82607 VITAMIN B-12: CPT

## 2022-11-01 PROCEDURE — 80053 COMPREHEN METABOLIC PANEL: CPT

## 2022-11-01 PROCEDURE — 85025 COMPLETE CBC W/AUTO DIFF WBC: CPT

## 2022-11-01 PROCEDURE — 84207 ASSAY OF VITAMIN B-6: CPT

## 2022-11-01 PROCEDURE — 80061 LIPID PANEL: CPT

## 2022-11-01 PROCEDURE — 84443 ASSAY THYROID STIM HORMONE: CPT

## 2022-11-02 LAB
FOLATE SERPL-MCNC: 19 NG/ML
TSH SERPL DL<=0.005 MIU/L-ACNC: 2.5 UIU/ML (ref 0.38–5.33)
VIT B12 SERPL-MCNC: 956 PG/ML (ref 211–911)

## 2022-11-04 LAB — VIT B6 SERPL-MCNC: 63.4 NMOL/L (ref 20–125)

## 2022-11-14 ENCOUNTER — OFFICE VISIT (OUTPATIENT)
Dept: MEDICAL GROUP | Facility: MEDICAL CENTER | Age: 72
End: 2022-11-14
Payer: MEDICARE

## 2022-11-14 VITALS
RESPIRATION RATE: 16 BRPM | OXYGEN SATURATION: 94 % | DIASTOLIC BLOOD PRESSURE: 88 MMHG | HEIGHT: 74 IN | WEIGHT: 212 LBS | SYSTOLIC BLOOD PRESSURE: 148 MMHG | HEART RATE: 102 BPM | TEMPERATURE: 97.6 F | BODY MASS INDEX: 27.21 KG/M2

## 2022-11-14 DIAGNOSIS — D75.89 MACROCYTOSIS: ICD-10-CM

## 2022-11-14 DIAGNOSIS — G62.9 NEUROPATHY: ICD-10-CM

## 2022-11-14 DIAGNOSIS — R73.03 PREDIABETES: ICD-10-CM

## 2022-11-14 DIAGNOSIS — I10 PRIMARY HYPERTENSION: ICD-10-CM

## 2022-11-14 DIAGNOSIS — E78.5 HYPERLIPIDEMIA, UNSPECIFIED HYPERLIPIDEMIA TYPE: ICD-10-CM

## 2022-11-14 PROCEDURE — 99214 OFFICE O/P EST MOD 30 MIN: CPT | Performed by: PHYSICIAN ASSISTANT

## 2022-11-14 RX ORDER — LOSARTAN POTASSIUM 50 MG/1
50 TABLET ORAL DAILY
Qty: 90 TABLET | Refills: 2 | Status: SHIPPED | OUTPATIENT
Start: 2022-11-14 | End: 2024-03-19

## 2022-11-14 RX ORDER — DOXAZOSIN MESYLATE 4 MG/1
4 TABLET ORAL EVERY EVENING
Qty: 90 TABLET | Refills: 3 | Status: SHIPPED | OUTPATIENT
Start: 2022-11-14 | End: 2023-12-27

## 2022-11-14 RX ORDER — SIMVASTATIN 40 MG
40 TABLET ORAL NIGHTLY
Qty: 90 TABLET | Refills: 2 | Status: SHIPPED | OUTPATIENT
Start: 2022-11-14 | End: 2023-05-15

## 2022-11-14 RX ORDER — GABAPENTIN 300 MG/1
CAPSULE ORAL
Qty: 60 CAPSULE | Refills: 3 | Status: SHIPPED | OUTPATIENT
Start: 2022-11-14 | End: 2023-09-29

## 2022-11-14 ASSESSMENT — PATIENT HEALTH QUESTIONNAIRE - PHQ9: CLINICAL INTERPRETATION OF PHQ2 SCORE: 0

## 2022-11-14 ASSESSMENT — FIBROSIS 4 INDEX: FIB4 SCORE: 5.76

## 2022-11-14 NOTE — ASSESSMENT & PLAN NOTE
Chronic and unstable  Continue losartan at 50mg daily.  Discussed increasing the dose or adding calcium channel blocker if patient's blood pressure is elevated at next visit.  It has improved today since previous visit.  Continue low-salt diet.  Patient notes that he does get episodes of imbalance every 4 days.  This could be secondary to the doxazosin.  He uses this both for hypertension and BPH.  At next visit we will consider stopping doxazosin and putting him on Flomax and seeing if this improves his imbalance.  May also consider then adding amlodipine to blood pressure regimen

## 2022-11-14 NOTE — ASSESSMENT & PLAN NOTE
Chronic and unstable  Cannot tolerate pregabalin  Has seen neurology in the past and they did nerve conduction studies and he was told neuropathy was not too bad.  His symptoms are problematic for him.  He is not a diabetic.  We will increase gabapentin dose from 100 mg in the evening to 300 mg in the evening and if needed to take twice daily

## 2022-11-14 NOTE — ASSESSMENT & PLAN NOTE
New and unstable  Appears to be related to alcohol consumption.  Did discuss the importance of him decreasing alcohol intake.  He states he is down to 2-3 drinks per night.  Did check folate and B vitamins and they were unremarkable

## 2022-11-14 NOTE — PROGRESS NOTES
"Subjective:   Balwinder Grimes is a 71 y.o. male here today for     HPI:Patient is here to discuss:    Patient is here to go over her lab results as well as to discuss his neuropathy.  Has had significant tingling in the bilateral toes.  Has seen neurology and they did EEG testing and he was told that his neuropathy is not that bad.  Hated how he felt on Lyrica.  Has tried gabapentin with me now for 1 month at 100 mg dose at night and it is not helping.      Current medicines (including changes today)  Current Outpatient Medications   Medication Sig Dispense Refill    doxazosin (CARDURA) 4 MG Tab Take 1 Tablet by mouth every evening. 90 Tablet 3    losartan (COZAAR) 50 MG Tab Take 1 Tablet by mouth every day. 90 Tablet 2    simvastatin (ZOCOR) 40 MG Tab Take 1 Tablet by mouth every evening. 90 Tablet 2    gabapentin (NEURONTIN) 300 MG Cap Take 1 tablet by mouth in the evening and after 3 days if still symptomatic may increase the dose to 1 tablet by mouth twice daily 60 Capsule 3     No current facility-administered medications for this visit.     He  has a past medical history of High cholesterol, Hypertension, Numbness and tingling, and Pain (08/2018).  Patient has no known allergies.     Social History and Family History were reviewed and updated.    ROS   No headaches, chest pain, no shortness of breath, abdominal pain, nausea, or vomiting.  All other systems were reviewed and are negative or noted as positive in the HPI.       Objective:     BP (!) 148/88 (BP Location: Right arm, Patient Position: Sitting, BP Cuff Size: Adult)   Pulse (!) 102   Temp 36.4 °C (97.6 °F) (Temporal)   Resp 16   Ht 1.88 m (6' 2\")   Wt 96.2 kg (212 lb)   SpO2 94%  Body mass index is 27.22 kg/m².     Physical Exam:  General: Patient appears well-nourished, well-hydrated, nontoxic  HEENT, normocephalic atraumatic, PERRLA, extraocular movements intact, nares are patent and clear  Neck: No visible masses, thyromegaly or " abnormalities noted  Cardiovascular.  Sitting comfortably without visible signs of edema  Lungs: No cyanosis noted, nondyspneic  Skin: Well perfused without evidence of rash or lesions  Neurological: Cranial nerves II through XII intact, normal gait  Musculoskeletal: Normal range of motion, normal strength and no deficit noted     Clinical Course/Lab Analysis:       Latest Reference Range & Units 11/01/22 14:13   WBC 4.8 - 10.8 K/uL 5.2   RBC 4.70 - 6.10 M/uL 4.38 (L)   Hemoglobin 14.0 - 18.0 g/dL 15.2   Hematocrit 42.0 - 52.0 % 44.3   MCV 81.4 - 97.8 fL 101.1 (H)   MCH 27.0 - 33.0 pg 34.7 (H)   MCHC 33.7 - 35.3 g/dL 34.3   RDW 35.9 - 50.0 fL 43.4   Platelet Count 164 - 446 K/uL 105 (L)   MPV 9.0 - 12.9 fL 9.6   Neutrophils-Polys 44.00 - 72.00 % 53.60   Neutrophils (Absolute) 1.82 - 7.42 K/uL 2.79   Lymphocytes 22.00 - 41.00 % 32.30   Lymphs (Absolute) 1.00 - 4.80 K/uL 1.68   Monocytes 0.00 - 13.40 % 10.40   Monos (Absolute) 0.00 - 0.85 K/uL 0.54   Eosinophils 0.00 - 6.90 % 2.30   Eos (Absolute) 0.00 - 0.51 K/uL 0.12   Basophils 0.00 - 1.80 % 1.20   Baso (Absolute) 0.00 - 0.12 K/uL 0.06   Immature Granulocytes 0.00 - 0.90 % 0.20   Immature Granulocytes (abs) 0.00 - 0.11 K/uL 0.01   Nucleated RBC /100 WBC 0.00   NRBC (Absolute) K/uL 0.00   Sodium 135 - 145 mmol/L 136   Potassium 3.6 - 5.5 mmol/L 4.4   Chloride 96 - 112 mmol/L 99   Co2 20 - 33 mmol/L 21   Anion Gap 7.0 - 16.0  16.0   Glucose 65 - 99 mg/dL 122 (H)   Bun 8 - 22 mg/dL 12   Creatinine 0.50 - 1.40 mg/dL 0.80   GFR (CKD-EPI) >60 mL/min/1.73 m 2 94   Calcium 8.5 - 10.5 mg/dL 9.2   AST(SGOT) 12 - 45 U/L 59 (H)   ALT(SGPT) 2 - 50 U/L 48   Alkaline Phosphatase 30 - 99 U/L 81   Total Bilirubin 0.1 - 1.5 mg/dL 1.4   Albumin 3.2 - 4.9 g/dL 4.6   Total Protein 6.0 - 8.2 g/dL 7.3   Globulin 1.9 - 3.5 g/dL 2.7   A-G Ratio g/dL 1.7   Glycohemoglobin 4.0 - 5.6 % 5.6   Estim. Avg Glu mg/dL 114   Fasting Status  Fasting   Cholesterol,Tot 100 - 199 mg/dL 218 (H)    Triglycerides 0 - 149 mg/dL 78   HDL >=40 mg/dL 80   LDL <100 mg/dL 122 (H)   (L): Data is abnormally low  (H): Data is abnormally high  Assessment and Plan:   The following treatment plan was discussed.  Signs and symptoms for which to return were discussed with patient at length.  Patient verbalized understanding.    Problem List Items Addressed This Visit       Primary hypertension     Chronic and unstable  Continue losartan at 50mg daily.  Discussed increasing the dose or adding calcium channel blocker if patient's blood pressure is elevated at next visit.  It has improved today since previous visit.  Continue low-salt diet.  Patient notes that he does get episodes of imbalance every 4 days.  This could be secondary to the doxazosin.  He uses this both for hypertension and BPH.  At next visit we will consider stopping doxazosin and putting him on Flomax and seeing if this improves his imbalance.  May also consider then adding amlodipine to blood pressure regimen         Relevant Medications    doxazosin (CARDURA) 4 MG Tab    losartan (COZAAR) 50 MG Tab    simvastatin (ZOCOR) 40 MG Tab    Hyperlipidemia     Chronic and unstable  Recommend changing to crestor. Patient wishes to stay on simvastatin         Relevant Medications    doxazosin (CARDURA) 4 MG Tab    losartan (COZAAR) 50 MG Tab    simvastatin (ZOCOR) 40 MG Tab    Prediabetes     Chronic and unstable  Patient's hemoglobin A1c was 5.6%.  He is just shy of being prediabetic.  Fasting glucose was 122 which was slightly higher than in April.  However 3-month average control is in normal range.  Cut back on alcohol and cut back on carbohydrates         Neuropathy     Chronic and unstable  Cannot tolerate pregabalin  Has seen neurology in the past and they did nerve conduction studies and he was told neuropathy was not too bad.  His symptoms are problematic for him.  He is not a diabetic.  We will increase gabapentin dose from 100 mg in the evening to 300 mg  in the evening and if needed to take twice daily         Relevant Medications    gabapentin (NEURONTIN) 300 MG Cap    Macrocytosis     New and unstable  Appears to be related to alcohol consumption.  Did discuss the importance of him decreasing alcohol intake.  He states he is down to 2-3 drinks per night.               Followup: 3 months    Please note that this dictation was created using voice recognition software. I have made every reasonable attempt to correct obvious errors, but I expect that there are errors of grammar and possibly content that I did not discover before finalizing the note.

## 2022-11-14 NOTE — ASSESSMENT & PLAN NOTE
Chronic and unstable  Patient's hemoglobin A1c was 5.6%.  He is just shy of being prediabetic.  Fasting glucose was 122 which was slightly higher than in April.  However 3-month average control is in normal range.  Cut back on alcohol and cut back on carbohydrates

## 2023-04-11 ENCOUNTER — APPOINTMENT (OUTPATIENT)
Dept: MEDICAL GROUP | Facility: MEDICAL CENTER | Age: 73
End: 2023-04-11
Payer: MEDICARE

## 2023-05-15 ENCOUNTER — OFFICE VISIT (OUTPATIENT)
Dept: MEDICAL GROUP | Facility: MEDICAL CENTER | Age: 73
End: 2023-05-15
Payer: MEDICARE

## 2023-05-15 VITALS
SYSTOLIC BLOOD PRESSURE: 128 MMHG | HEART RATE: 78 BPM | TEMPERATURE: 97.8 F | OXYGEN SATURATION: 94 % | BODY MASS INDEX: 28.02 KG/M2 | WEIGHT: 218.3 LBS | HEIGHT: 74 IN | RESPIRATION RATE: 20 BRPM | DIASTOLIC BLOOD PRESSURE: 86 MMHG

## 2023-05-15 DIAGNOSIS — G62.9 NEUROPATHY: ICD-10-CM

## 2023-05-15 DIAGNOSIS — I71.40 ABDOMINAL AORTIC ANEURYSM (AAA) WITHOUT RUPTURE, UNSPECIFIED PART (HCC): ICD-10-CM

## 2023-05-15 DIAGNOSIS — R73.09 ELEVATED GLUCOSE: ICD-10-CM

## 2023-05-15 DIAGNOSIS — Z79.899 MEDICATION MANAGEMENT: ICD-10-CM

## 2023-05-15 DIAGNOSIS — E78.2 MIXED HYPERLIPIDEMIA: ICD-10-CM

## 2023-05-15 PROCEDURE — 99214 OFFICE O/P EST MOD 30 MIN: CPT | Performed by: PHYSICIAN ASSISTANT

## 2023-05-15 PROCEDURE — 3074F SYST BP LT 130 MM HG: CPT | Performed by: PHYSICIAN ASSISTANT

## 2023-05-15 PROCEDURE — 3079F DIAST BP 80-89 MM HG: CPT | Performed by: PHYSICIAN ASSISTANT

## 2023-05-15 RX ORDER — DULOXETIN HYDROCHLORIDE 20 MG/1
20 CAPSULE, DELAYED RELEASE ORAL DAILY
Qty: 30 CAPSULE | Refills: 3 | Status: SHIPPED | OUTPATIENT
Start: 2023-05-15 | End: 2023-06-29

## 2023-05-15 RX ORDER — ROSUVASTATIN CALCIUM 5 MG/1
5 TABLET, COATED ORAL EVERY EVENING
Qty: 90 TABLET | Refills: 3 | Status: SHIPPED | OUTPATIENT
Start: 2023-05-15 | End: 2023-06-29

## 2023-05-15 ASSESSMENT — FIBROSIS 4 INDEX: FIB4 SCORE: 5.84

## 2023-05-15 ASSESSMENT — PATIENT HEALTH QUESTIONNAIRE - PHQ9: CLINICAL INTERPRETATION OF PHQ2 SCORE: 0

## 2023-05-15 NOTE — PROGRESS NOTES
"Subjective:   Balwinder Grimes is a 72 y.o. male here today for     HPI:Patient is here to discuss:    Problem   Neuropathy    5/11/23  Chronic and unstable  Cannot tolerate pregabalin  Has seen neurology in the past and they did nerve conduction studies and he was told neuropathy was not too bad.  His symptoms are problematic for him.  He is not a diabetic.  We tried to increase gabapentin dose from 100 mg in the evening to 300 mg in the evening and patient reports it makes him too sleepy.  He has not been drinking alcohol daily       Aaa (Abdominal Aortic Aneurysm) ([facility]cc)    5/11/23: incidental finding. Was 2.8cms. been stable with 6 or 7 ultrasounds.           ROS   No headaches, chest pain, no shortness of breath, abdominal pain, nausea, or vomiting.  All other systems were reviewed and are negative or noted as positive in the HPI.     Objective:     /86 (BP Location: Right arm, Patient Position: Sitting, BP Cuff Size: Adult)   Pulse 78   Temp 36.6 °C (97.8 °F) (Temporal)   Resp 20   Ht 1.88 m (6' 2\")   Wt 99 kg (218 lb 4.8 oz)   SpO2 94%  Body mass index is 28.03 kg/m².     Physical Exam:  General: Patient appears well-nourished, well-hydrated, nontoxic  HEENT, normocephalic atraumatic, PERRLA, extraocular movements intact, nares are patent and clear  Neck: No visible masses, thyromegaly or abnormalities noted  Cardiovascular.  Sitting comfortably without visible signs of edema  Lungs: No cyanosis noted, nondyspneic  Skin: Well perfused without evidence of rash or lesions  Neurological: Cranial nerves II through XII intact, normal gait  Musculoskeletal: Normal range of motion, normal strength and no deficit noted     Clinical Course/Lab Analysis:  No visits with results within 1 Month(s) from this visit.   Latest known visit with results is:   Hospital Outpatient Visit on 11/01/2022   Component Date Value Ref Range Status    WBC 11/01/2022 5.2  4.8 - 10.8 K/uL Final    RBC 11/01/2022 4.38 (L)  " 4.70 - 6.10 M/uL Final    Hemoglobin 11/01/2022 15.2  14.0 - 18.0 g/dL Final    Hematocrit 11/01/2022 44.3  42.0 - 52.0 % Final    MCV 11/01/2022 101.1 (H)  81.4 - 97.8 fL Final    MCH 11/01/2022 34.7 (H)  27.0 - 33.0 pg Final    MCHC 11/01/2022 34.3  33.7 - 35.3 g/dL Final    RDW 11/01/2022 43.4  35.9 - 50.0 fL Final    Platelet Count 11/01/2022 105 (L)  164 - 446 K/uL Final    MPV 11/01/2022 9.6  9.0 - 12.9 fL Final    Neutrophils-Polys 11/01/2022 53.60  44.00 - 72.00 % Final    Lymphocytes 11/01/2022 32.30  22.00 - 41.00 % Final    Monocytes 11/01/2022 10.40  0.00 - 13.40 % Final    Eosinophils 11/01/2022 2.30  0.00 - 6.90 % Final    Basophils 11/01/2022 1.20  0.00 - 1.80 % Final    Immature Granulocytes 11/01/2022 0.20  0.00 - 0.90 % Final    Nucleated RBC 11/01/2022 0.00  /100 WBC Final    Neutrophils (Absolute) 11/01/2022 2.79  1.82 - 7.42 K/uL Final    Lymphs (Absolute) 11/01/2022 1.68  1.00 - 4.80 K/uL Final    Monos (Absolute) 11/01/2022 0.54  0.00 - 0.85 K/uL Final    Eos (Absolute) 11/01/2022 0.12  0.00 - 0.51 K/uL Final    Baso (Absolute) 11/01/2022 0.06  0.00 - 0.12 K/uL Final    Immature Granulocytes (abs) 11/01/2022 0.01  0.00 - 0.11 K/uL Final    NRBC (Absolute) 11/01/2022 0.00  K/uL Final    Sodium 11/01/2022 136  135 - 145 mmol/L Final    Potassium 11/01/2022 4.4  3.6 - 5.5 mmol/L Final    Chloride 11/01/2022 99  96 - 112 mmol/L Final    Co2 11/01/2022 21  20 - 33 mmol/L Final    Anion Gap 11/01/2022 16.0  7.0 - 16.0 Final    Glucose 11/01/2022 122 (H)  65 - 99 mg/dL Final    Bun 11/01/2022 12  8 - 22 mg/dL Final    Creatinine 11/01/2022 0.80  0.50 - 1.40 mg/dL Final    Calcium 11/01/2022 9.2  8.5 - 10.5 mg/dL Final    AST(SGOT) 11/01/2022 59 (H)  12 - 45 U/L Final    ALT(SGPT) 11/01/2022 48  2 - 50 U/L Final    Alkaline Phosphatase 11/01/2022 81  30 - 99 U/L Final    Total Bilirubin 11/01/2022 1.4  0.1 - 1.5 mg/dL Final    Albumin 11/01/2022 4.6  3.2 - 4.9 g/dL Final    Total Protein 11/01/2022  7.3  6.0 - 8.2 g/dL Final    Globulin 11/01/2022 2.7  1.9 - 3.5 g/dL Final    A-G Ratio 11/01/2022 1.7  g/dL Final    Glycohemoglobin 11/01/2022 5.6  4.0 - 5.6 % Final    Est Avg Glucose 11/01/2022 114  mg/dL Final    Cholesterol,Tot 11/01/2022 218 (H)  100 - 199 mg/dL Final    Triglycerides 11/01/2022 78  0 - 149 mg/dL Final    HDL 11/01/2022 80  >=40 mg/dL Final    LDL 11/01/2022 122 (H)  <100 mg/dL Final    TSH 11/01/2022 2.500  0.380 - 5.330 uIU/mL Final    Folate -Folic Acid 11/01/2022 19.0  >4.0 ng/mL Final    Vitamin B6 11/01/2022 63.4  20.0 - 125.0 nmol/L Final    Vitamin B12 -True Cobalamin 11/01/2022 956 (H)  211 - 911 pg/mL Final    Fasting Status 11/01/2022 Fasting   Final    GFR (CKD-EPI) 11/01/2022 94  >60 mL/min/1.73 m 2 Final          Assessment and Plan:   The following treatment plan was discussed.  Signs and symptoms for which to return were discussed with patient at length.  Patient verbalized understanding.    1. Abdominal aortic aneurysm (AAA) without rupture, unspecified part (HCC)  US-ABDOMINAL AORTA W/O DOPPLER      2. Neuropathy  Referral to Pain Clinic    DULoxetine (CYMBALTA) 20 MG Cap DR Particles    Lipid Profile    Comp Metabolic Panel      3. Mixed hyperlipidemia  rosuvastatin (CRESTOR) 5 MG Tab    Lipid Profile    Comp Metabolic Panel      4. Medication management        5. Elevated glucose  HEMOGLOBIN A1C          1.  Chronic condition: Patient has had incidental finding of a AAA that was approximately 2.8 cm in diameter.  He did get serial ultrasounds that showed stability.  He has not had one in some time so I am ordering an abdominal ultrasound to check today    2.  Chronic and unstable: Patient's had neuropathy sensation for some time.  He is not a diabetic.  He does not drink daily.  Lab values have been normal.  We tried gabapentin but it just makes him sleepy and does not help his symptoms.  He was on Lyrica for a 10-day period in the past and it made him feel crazy.  I am  going to start him duloxetine and given referral to physiatry.  He will follow-up in about 6 weeks and let me know how he is doing    3.  Chronic condition that is stable.  Patient was on simvastatin for cholesterol.  I am switching him to Niru statin because this is a better medicine.  We will start low-dose at 5 mg and increase likely in 6 weeks.    Primary hypertension        Chronic and unstable  Continue losartan at 50mg daily.  Discussed increasing the dose or adding calcium channel blocker if patient's blood pressure is elevated at next visit.  It has improved today since previous visit.  Continue low-salt diet.  Patient notes that he does get episodes of imbalance every 4 days.  This could be secondary to the doxazosin.  He uses this both for hypertension and BPH.  At next visit we will consider stopping doxazosin and putting him on Flomax and seeing if this improves his imbalance.  May also consider then adding amlodipine to blood pressure regimen           Relevant Medications     doxazosin (CARDURA) 4 MG Tab     losartan (COZAAR) 50 MG Tab     simvastatin (ZOCOR) 40 MG Tab     Hyperlipidemia       Chronic and unstable  Recommend changing to crestor. Patient wishes to stay on simvastatin           Relevant Medications     doxazosin (CARDURA) 4 MG Tab     losartan (COZAAR) 50 MG Tab     simvastatin (ZOCOR) 40 MG Tab     Prediabetes       Chronic and unstable  Patient's hemoglobin A1c was 5.6%.  He is just shy of being prediabetic.  Fasting glucose was 122 which was slightly higher than in April.  However 3-month average control is in normal range.  Cut back on alcohol and cut back on carbohydrates           Neuropathy       Chronic and unstable  Cannot tolerate pregabalin  Has seen neurology in the past and they did nerve conduction studies and he was told neuropathy was not too bad.  His symptoms are problematic for him.  He is not a diabetic.  We will increase gabapentin dose from 100 mg in the evening to  300 mg in the evening and if needed to take twice daily           Relevant Medications     gabapentin (NEURONTIN) 300 MG Cap     Macrocytosis       New and unstable  Appears to be related to alcohol consumption.  Did discuss the importance of him decreasing alcohol intake.  He states he is down to 2-3 drinks per night.           Followup:    Please note that this dictation was created using voice recognition software. I have made every reasonable attempt to correct obvious errors, but I expect that there are errors of grammar and possibly content that I did not discover before finalizing the note.

## 2023-06-11 ENCOUNTER — HOSPITAL ENCOUNTER (OUTPATIENT)
Dept: RADIOLOGY | Facility: MEDICAL CENTER | Age: 73
End: 2023-06-11
Attending: PHYSICIAN ASSISTANT
Payer: MEDICARE

## 2023-06-11 DIAGNOSIS — I71.40 ABDOMINAL AORTIC ANEURYSM (AAA) WITHOUT RUPTURE, UNSPECIFIED PART (HCC): ICD-10-CM

## 2023-06-11 PROCEDURE — 76775 US EXAM ABDO BACK WALL LIM: CPT

## 2023-06-19 ENCOUNTER — APPOINTMENT (OUTPATIENT)
Dept: MEDICAL GROUP | Facility: MEDICAL CENTER | Age: 73
End: 2023-06-19
Payer: MEDICARE

## 2023-06-20 ENCOUNTER — APPOINTMENT (OUTPATIENT)
Dept: MEDICAL GROUP | Facility: MEDICAL CENTER | Age: 73
End: 2023-06-20
Payer: MEDICARE

## 2023-06-21 ENCOUNTER — HOSPITAL ENCOUNTER (OUTPATIENT)
Dept: LAB | Facility: MEDICAL CENTER | Age: 73
End: 2023-06-21
Attending: PHYSICIAN ASSISTANT
Payer: MEDICARE

## 2023-06-21 DIAGNOSIS — E78.2 MIXED HYPERLIPIDEMIA: ICD-10-CM

## 2023-06-21 DIAGNOSIS — G62.9 NEUROPATHY: ICD-10-CM

## 2023-06-21 DIAGNOSIS — R73.09 ELEVATED GLUCOSE: ICD-10-CM

## 2023-06-21 LAB
EST. AVERAGE GLUCOSE BLD GHB EST-MCNC: 126 MG/DL
HBA1C MFR BLD: 6 % (ref 4–5.6)

## 2023-06-21 PROCEDURE — 80053 COMPREHEN METABOLIC PANEL: CPT

## 2023-06-21 PROCEDURE — 36415 COLL VENOUS BLD VENIPUNCTURE: CPT | Mod: GA

## 2023-06-21 PROCEDURE — 83036 HEMOGLOBIN GLYCOSYLATED A1C: CPT | Mod: GA

## 2023-06-21 PROCEDURE — 80061 LIPID PANEL: CPT

## 2023-06-22 LAB
ALBUMIN SERPL BCP-MCNC: 4.4 G/DL (ref 3.2–4.9)
ALBUMIN/GLOB SERPL: 1.8 G/DL
ALP SERPL-CCNC: 87 U/L (ref 30–99)
ALT SERPL-CCNC: 40 U/L (ref 2–50)
ANION GAP SERPL CALC-SCNC: 12 MMOL/L (ref 7–16)
AST SERPL-CCNC: 44 U/L (ref 12–45)
BILIRUB SERPL-MCNC: 0.6 MG/DL (ref 0.1–1.5)
BUN SERPL-MCNC: 7 MG/DL (ref 8–22)
CALCIUM ALBUM COR SERPL-MCNC: 8.7 MG/DL (ref 8.5–10.5)
CALCIUM SERPL-MCNC: 9 MG/DL (ref 8.5–10.5)
CHLORIDE SERPL-SCNC: 104 MMOL/L (ref 96–112)
CHOLEST SERPL-MCNC: 268 MG/DL (ref 100–199)
CO2 SERPL-SCNC: 25 MMOL/L (ref 20–33)
CREAT SERPL-MCNC: 0.73 MG/DL (ref 0.5–1.4)
FASTING STATUS PATIENT QL REPORTED: NORMAL
GFR SERPLBLD CREATININE-BSD FMLA CKD-EPI: 96 ML/MIN/1.73 M 2
GLOBULIN SER CALC-MCNC: 2.5 G/DL (ref 1.9–3.5)
GLUCOSE SERPL-MCNC: 146 MG/DL (ref 65–99)
HDLC SERPL-MCNC: 66 MG/DL
LDLC SERPL CALC-MCNC: 170 MG/DL
POTASSIUM SERPL-SCNC: 4.3 MMOL/L (ref 3.6–5.5)
PROT SERPL-MCNC: 6.9 G/DL (ref 6–8.2)
SODIUM SERPL-SCNC: 141 MMOL/L (ref 135–145)
TRIGL SERPL-MCNC: 161 MG/DL (ref 0–149)

## 2023-06-29 ENCOUNTER — OFFICE VISIT (OUTPATIENT)
Dept: MEDICAL GROUP | Facility: MEDICAL CENTER | Age: 73
End: 2023-06-29
Payer: MEDICARE

## 2023-06-29 VITALS
OXYGEN SATURATION: 98 % | SYSTOLIC BLOOD PRESSURE: 124 MMHG | DIASTOLIC BLOOD PRESSURE: 82 MMHG | RESPIRATION RATE: 20 BRPM | BODY MASS INDEX: 27.85 KG/M2 | HEART RATE: 114 BPM | TEMPERATURE: 98.2 F | HEIGHT: 74 IN | WEIGHT: 217 LBS

## 2023-06-29 DIAGNOSIS — G62.9 NEUROPATHY: ICD-10-CM

## 2023-06-29 DIAGNOSIS — R73.03 PREDIABETES: ICD-10-CM

## 2023-06-29 DIAGNOSIS — E78.2 MIXED HYPERLIPIDEMIA: ICD-10-CM

## 2023-06-29 DIAGNOSIS — I71.40 ABDOMINAL AORTIC ANEURYSM (AAA) WITHOUT RUPTURE, UNSPECIFIED PART (HCC): ICD-10-CM

## 2023-06-29 PROCEDURE — 3079F DIAST BP 80-89 MM HG: CPT | Performed by: PHYSICIAN ASSISTANT

## 2023-06-29 PROCEDURE — 3074F SYST BP LT 130 MM HG: CPT | Performed by: PHYSICIAN ASSISTANT

## 2023-06-29 PROCEDURE — 99214 OFFICE O/P EST MOD 30 MIN: CPT | Performed by: PHYSICIAN ASSISTANT

## 2023-06-29 RX ORDER — DULOXETIN HYDROCHLORIDE 30 MG/1
30 CAPSULE, DELAYED RELEASE ORAL DAILY
Qty: 30 CAPSULE | Refills: 3 | Status: SHIPPED | OUTPATIENT
Start: 2023-06-29

## 2023-06-29 RX ORDER — ROSUVASTATIN CALCIUM 20 MG/1
20 TABLET, COATED ORAL EVERY EVENING
Qty: 30 TABLET | Refills: 11 | Status: SHIPPED | OUTPATIENT
Start: 2023-06-29

## 2023-06-29 ASSESSMENT — FIBROSIS 4 INDEX: FIB4 SCORE: 4.77

## 2023-06-29 NOTE — PROGRESS NOTES
"Subjective:   Balwinder Grimes is a 72 y.o. male here today for     HPI:Patient is here to discuss:    Problem   Hyperlipidemia    June 2023: The 10-year ASCVD risk score (Marilee MORALES, et al., 2019) is: 22.9%    I stopped pravastatin and started Crestor     Prediabetes    June 29, 2023: Hemoglobin A1c was 6.0%     Aaa (Abdominal Aortic Aneurysm) ([facility])    5/11/23: incidental finding. Was 2.8cms. been stable with 6 or 7 ultrasounds.    6/29/23: Ultrasound abdominal was done and aortic aneurysm is 3.8 cm now         ROS   No headaches, chest pain, no shortness of breath, abdominal pain, nausea, or vomiting.  All other systems were reviewed and are negative or noted as positive in the HPI.     Objective:     /82 (BP Location: Right arm, Patient Position: Sitting, BP Cuff Size: Adult)   Pulse (!) 114   Temp 36.8 °C (98.2 °F) (Temporal)   Resp 20   Ht 1.88 m (6' 2\")   Wt 98.4 kg (217 lb)   SpO2 98%  Body mass index is 27.86 kg/m².     Physical Exam:  General: Patient appears well-nourished, well-hydrated, nontoxic  HEENT, normocephalic atraumatic, PERRLA, extraocular movements intact, nares are patent and clear  Neck: No visible masses, thyromegaly or abnormalities noted  Cardiovascular.  Sitting comfortably without visible signs of edema  Lungs: No cyanosis noted, nondyspneic  Skin: Well perfused without evidence of rash or lesions  Neurological: Cranial nerves II through XII intact, normal gait  Musculoskeletal: Normal range of motion, normal strength and no deficit noted     Clinical Course/Lab Analysis:     Latest Reference Range & Units 06/21/23 09:45   Sodium 135 - 145 mmol/L 141   Potassium 3.6 - 5.5 mmol/L 4.3   Chloride 96 - 112 mmol/L 104   Co2 20 - 33 mmol/L 25   Anion Gap 7.0 - 16.0  12.0   Glucose 65 - 99 mg/dL 146 (H)   Bun 8 - 22 mg/dL 7 (L)   Creatinine 0.50 - 1.40 mg/dL 0.73   GFR (CKD-EPI) >60 mL/min/1.73 m 2 96   Calcium 8.5 - 10.5 mg/dL 9.0   Correct Calcium 8.5 - 10.5 mg/dL 8.7 "   AST(SGOT) 12 - 45 U/L 44   ALT(SGPT) 2 - 50 U/L 40   Alkaline Phosphatase 30 - 99 U/L 87   Total Bilirubin 0.1 - 1.5 mg/dL 0.6   Albumin 3.2 - 4.9 g/dL 4.4   Total Protein 6.0 - 8.2 g/dL 6.9   Globulin 1.9 - 3.5 g/dL 2.5   A-G Ratio g/dL 1.8   Glycohemoglobin 4.0 - 5.6 % 6.0 (H)   Estim. Avg Glu mg/dL 126   Fasting Status  Fasting   Cholesterol,Tot 100 - 199 mg/dL 268 (H)   Triglycerides 0 - 149 mg/dL 161 (H)   HDL >=40 mg/dL 66   LDL <100 mg/dL 170 (H)   (H): Data is abnormally high  (L): Data is abnormally low  Ultrasound of the aorta.     COMPARISON: None     FINDINGS:     REAL-TIME GRAY-SCALE IMAGING:  Real-time gray-scale imaging reveals no evidence of arterial dissection. No aneurysm or pseudo-aneurysm is identified.     COLOR AND DUPLEX DOPPLER IMAGING:  There is mild atherosclerosis of the visualized abdominal aorta.     Proximal abdominal aorta measures 2.67 cm x 2.67 cm.     Mid abdominal aorta measures 2.71 cm x 2.71 cm.     Distal abdominal aorta measures 3.84 cm x 3.42 cm.     The right common iliac artery measures 1.45 cm x 1.55 cm, the left 1.43 cm x 1.52 cm. Peak systolic velocities are elevated in the common iliac arteries with the right measuring 220 cm/s and the left measuring 184 cm/s     No incidental abnormalities in the visualized portions of the retroperitoneum are identified.     IMPRESSION:     1.  Infrarenal abdominal aortic aneurysm measuring 3.8 cm in maximum diameter.     2.  Increased velocities in bilateral common iliac artery suggesting atherosclerotic disease/possible stenosis.  Assessment and Plan:   The following treatment plan was discussed.  Signs and symptoms for which to return were discussed with patient at length.  Patient verbalized understanding.    1. Abdominal aortic aneurysm (AAA) without rupture, unspecified part (HCC)        2. Neuropathy  DULoxetine (CYMBALTA) 30 MG Cap DR Particles      3. Mixed hyperlipidemia  rosuvastatin (CRESTOR) 20 MG Tab    Comp Metabolic  Panel    Lipid Profile      4. Prediabetes             1.  Abdominal aortic aneurysm, chronic condition: Patient has had incidental finding of a AAA that was approximately 2.8 cm in diameter.  He did get serial ultrasounds that showed stability.  We did a repeat ultrasound in June 2023 and it is now at 3.8 cm in diameter.  Last ultrasound was approximately 6 years ago.  We will follow serially and order another 1 in June 2024 and I will send to vascular surgery if size increases by 0.5 cm or goes over the recommended repair limit    2.  Neuropathy, chronic and unstable: Patient's had neuropathy sensation for some time.  He is not a diabetic.  He does not drink daily.  Lab values have been normal.  We tried gabapentin but it just makes him sleepy and does not help his symptoms.  He was on Lyrica for a 10-day period in the past and it made him feel crazy.  At last visit I started Balwinder on duloxetine 20 mg tablets.  He has not had intolerable side effects but does not feel it is helped much.  We will go up to 30 mg and see how he does if it does not help him we will stop the medication    3.  Mixed hyperlipidemia, chronic condition that is stable.  Patient was on simvastatin for cholesterol.  The 10-year ASCVD risk score ([place] DK, et al., 2019) is: 22.9%  I stop the pravastatin and started Crestor at a dose of 20 mg.  Initially months milligrams to make sure he can tolerate.  With an ASCVD of 22% he needs to be on high-dose    4.  Prediabetes new and unstable.  Hemoglobin A1c was six-point percent.  You got to cut out the sugars    Followup: 3 months    Please note that this dictation was created using voice recognition software. I have made every reasonable attempt to correct obvious errors, but I expect that there are errors of grammar and possibly content that I did not discover before finalizing the note.

## 2023-08-15 ENCOUNTER — TELEPHONE (OUTPATIENT)
Dept: HEALTH INFORMATION MANAGEMENT | Facility: OTHER | Age: 73
End: 2023-08-15
Payer: MEDICARE

## 2023-09-29 ENCOUNTER — APPOINTMENT (OUTPATIENT)
Dept: RADIOLOGY | Facility: MEDICAL CENTER | Age: 73
End: 2023-09-29
Attending: EMERGENCY MEDICINE
Payer: MEDICARE

## 2023-09-29 ENCOUNTER — HOSPITAL ENCOUNTER (EMERGENCY)
Facility: MEDICAL CENTER | Age: 73
End: 2023-09-29
Attending: EMERGENCY MEDICINE
Payer: MEDICARE

## 2023-09-29 VITALS
WEIGHT: 216.93 LBS | HEIGHT: 74 IN | TEMPERATURE: 98 F | SYSTOLIC BLOOD PRESSURE: 171 MMHG | HEART RATE: 81 BPM | RESPIRATION RATE: 18 BRPM | BODY MASS INDEX: 27.84 KG/M2 | OXYGEN SATURATION: 95 % | DIASTOLIC BLOOD PRESSURE: 88 MMHG

## 2023-09-29 DIAGNOSIS — S09.90XA CLOSED HEAD INJURY, INITIAL ENCOUNTER: ICD-10-CM
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
## 2023-09-29 DIAGNOSIS — S51.812A SKIN TEAR OF LEFT FOREARM WITHOUT COMPLICATION, INITIAL ENCOUNTER: ICD-10-CM

## 2023-09-29 DIAGNOSIS — F10.920 ALCOHOLIC INTOXICATION WITHOUT COMPLICATION (HCC): ICD-10-CM

## 2023-09-29 LAB
ALBUMIN SERPL BCP-MCNC: 3.9 G/DL (ref 3.2–4.9)
ALBUMIN/GLOB SERPL: 1.7 G/DL
ALP SERPL-CCNC: 118 U/L (ref 30–99)
ALT SERPL-CCNC: 49 U/L (ref 2–50)
ANION GAP SERPL CALC-SCNC: 14 MMOL/L (ref 7–16)
AST SERPL-CCNC: 79 U/L (ref 12–45)
BASOPHILS # BLD AUTO: 1.5 % (ref 0–1.8)
BASOPHILS # BLD: 0.06 K/UL (ref 0–0.12)
BILIRUB SERPL-MCNC: 0.5 MG/DL (ref 0.1–1.5)
BUN SERPL-MCNC: 5 MG/DL (ref 8–22)
CALCIUM ALBUM COR SERPL-MCNC: 8.7 MG/DL (ref 8.5–10.5)
CALCIUM SERPL-MCNC: 8.6 MG/DL (ref 8.5–10.5)
CHLORIDE SERPL-SCNC: 107 MMOL/L (ref 96–112)
CO2 SERPL-SCNC: 24 MMOL/L (ref 20–33)
CREAT SERPL-MCNC: 0.68 MG/DL (ref 0.5–1.4)
EOSINOPHIL # BLD AUTO: 0.23 K/UL (ref 0–0.51)
EOSINOPHIL NFR BLD: 5.8 % (ref 0–6.9)
ERYTHROCYTE [DISTWIDTH] IN BLOOD BY AUTOMATED COUNT: 44.8 FL (ref 35.9–50)
ETHANOL BLD-MCNC: 391.3 MG/DL
GFR SERPLBLD CREATININE-BSD FMLA CKD-EPI: 98 ML/MIN/1.73 M 2
GLOBULIN SER CALC-MCNC: 2.3 G/DL (ref 1.9–3.5)
GLUCOSE SERPL-MCNC: 125 MG/DL (ref 65–99)
HCT VFR BLD AUTO: 41.7 % (ref 42–52)
HGB BLD-MCNC: 14.4 G/DL (ref 14–18)
IMM GRANULOCYTES # BLD AUTO: 0.02 K/UL (ref 0–0.11)
IMM GRANULOCYTES NFR BLD AUTO: 0.5 % (ref 0–0.9)
INR PPP: 1.04 (ref 0.87–1.13)
LYMPHOCYTES # BLD AUTO: 1.72 K/UL (ref 1–4.8)
LYMPHOCYTES NFR BLD: 43.4 % (ref 22–41)
MCH RBC QN AUTO: 36.1 PG (ref 27–33)
MCHC RBC AUTO-ENTMCNC: 34.5 G/DL (ref 32.3–36.5)
MCV RBC AUTO: 104.5 FL (ref 81.4–97.8)
MONOCYTES # BLD AUTO: 0.52 K/UL (ref 0–0.85)
MONOCYTES NFR BLD AUTO: 13.1 % (ref 0–13.4)
NEUTROPHILS # BLD AUTO: 1.41 K/UL (ref 1.82–7.42)
NEUTROPHILS NFR BLD: 35.7 % (ref 44–72)
NRBC # BLD AUTO: 0 K/UL
NRBC BLD-RTO: 0 /100 WBC (ref 0–0.2)
PLATELET # BLD AUTO: 101 K/UL (ref 164–446)
PMV BLD AUTO: 9.4 FL (ref 9–12.9)
POTASSIUM SERPL-SCNC: 4.1 MMOL/L (ref 3.6–5.5)
PROT SERPL-MCNC: 6.2 G/DL (ref 6–8.2)
PROTHROMBIN TIME: 13.7 SEC (ref 12–14.6)
RBC # BLD AUTO: 3.99 M/UL (ref 4.7–6.1)
SODIUM SERPL-SCNC: 145 MMOL/L (ref 135–145)
WBC # BLD AUTO: 4 K/UL (ref 4.8–10.8)

## 2023-09-29 PROCEDURE — 85025 COMPLETE CBC W/AUTO DIFF WBC: CPT

## 2023-09-29 PROCEDURE — 70450 CT HEAD/BRAIN W/O DYE: CPT

## 2023-09-29 PROCEDURE — 85610 PROTHROMBIN TIME: CPT

## 2023-09-29 PROCEDURE — 80053 COMPREHEN METABOLIC PANEL: CPT

## 2023-09-29 PROCEDURE — 96374 THER/PROPH/DIAG INJ IV PUSH: CPT

## 2023-09-29 PROCEDURE — 700102 HCHG RX REV CODE 250 W/ 637 OVERRIDE(OP): Performed by: EMERGENCY MEDICINE

## 2023-09-29 PROCEDURE — A9270 NON-COVERED ITEM OR SERVICE: HCPCS | Performed by: EMERGENCY MEDICINE

## 2023-09-29 PROCEDURE — 36415 COLL VENOUS BLD VENIPUNCTURE: CPT

## 2023-09-29 PROCEDURE — 700111 HCHG RX REV CODE 636 W/ 250 OVERRIDE (IP): Performed by: EMERGENCY MEDICINE

## 2023-09-29 PROCEDURE — 304217 HCHG IRRIGATION SYSTEM

## 2023-09-29 PROCEDURE — 90471 IMMUNIZATION ADMIN: CPT

## 2023-09-29 PROCEDURE — 82077 ASSAY SPEC XCP UR&BREATH IA: CPT

## 2023-09-29 PROCEDURE — 72125 CT NECK SPINE W/O DYE: CPT

## 2023-09-29 PROCEDURE — 90715 TDAP VACCINE 7 YRS/> IM: CPT | Performed by: EMERGENCY MEDICINE

## 2023-09-29 PROCEDURE — 99285 EMERGENCY DEPT VISIT HI MDM: CPT

## 2023-09-29 RX ORDER — DIAZEPAM 5 MG/1
10 TABLET ORAL ONCE
Status: COMPLETED | OUTPATIENT
Start: 2023-09-29 | End: 2023-09-29

## 2023-09-29 RX ORDER — LORAZEPAM 2 MG/ML
2 INJECTION INTRAMUSCULAR ONCE
Status: COMPLETED | OUTPATIENT
Start: 2023-09-29 | End: 2023-09-29

## 2023-09-29 RX ADMIN — DIAZEPAM 10 MG: 5 TABLET ORAL at 06:41

## 2023-09-29 RX ADMIN — LORAZEPAM 2 MG: 2 INJECTION INTRAMUSCULAR; INTRAVENOUS at 09:16

## 2023-09-29 RX ADMIN — CLOSTRIDIUM TETANI TOXOID ANTIGEN (FORMALDEHYDE INACTIVATED), CORYNEBACTERIUM DIPHTHERIAE TOXOID ANTIGEN (FORMALDEHYDE INACTIVATED), BORDETELLA PERTUSSIS TOXOID ANTIGEN (GLUTARALDEHYDE INACTIVATED), BORDETELLA PERTUSSIS FILAMENTOUS HEMAGGLUTININ ANTIGEN (FORMALDEHYDE INACTIVATED), BORDETELLA PERTUSSIS PERTACTIN ANTIGEN, AND BORDETELLA PERTUSSIS FIMBRIAE 2/3 ANTIGEN 0.5 ML: 5; 2; 2.5; 5; 3; 5 INJECTION, SUSPENSION INTRAMUSCULAR at 02:51

## 2023-09-29 ASSESSMENT — FIBROSIS 4 INDEX: FIB4 SCORE: 4.77

## 2023-09-29 NOTE — ED NOTES
Med Rec complete per Pt's home pharmacy.  Allergies reviewed w/Pt at bedside.    Home Pharmacy:  [facility]

## 2023-09-29 NOTE — DISCHARGE SUMMARY
ED Observation Discharge Summary    Scribed for Mary Lugo M.d. by Johnathon Poon. 2023  12:12 PM    Patient:Balwinder Grimes  Patient : 1950  Patient MRN: [id number]  Patient PCP: Sandra Moreau P.A.-C.    Admit Date: 2023  Discharge Date and Time: 23 12:12 PM  Discharge Diagnosis:   1. Fall, initial encounter    2. Closed head injury, initial encounter    3. Skin tear of left forearm without complication, initial encounter    4. Alcoholic intoxication without complication (HCC)         Discharge Attending: Mary Lugo M.d.   Discharge Service: ED Observation    ED Course  Balwinder is a 72 y.o. male who was evaluated at [facility].  Mr. Grimes initially presented to this emergency department early this morning out of concern for traumatic brain injury.  He was initially presented as a code TBI for expedited imaging.  He was brought from home where he had reportedly been drinking, had a ground-level fall.  No history of antiplatelet or anticoagulation agent use.  There was some repetitive questioning documented on initial arrival.     He was taken emergently for CT imaging which did not demonstrate any intracranial injury. Diagnostic alcohol level resulted at 391, consistent with the patient's clinical presentation.  At time of shift change, he remains under observation, with close hemodynamic monitoring, and monitoring for clinical sobriety.  He did seem to have some improvement overnight, though was not able to ambulate safely at time of shift change.     On my initial assessment he is awake and alert, requesting discharge.  States that he does have some family members coming to pick him up.  He seems comfortable, without evidence of agitation.  When asked to extend his arms he does have a very mild tremor raising concern for early signs of alcohol withdrawal.  For this reason he was given an oral dose of Valium while waiting for his family.  Family member arrived at bedside, and he  "was observed for a while longer until he was able to safely ambulate.  He did have some recurrence of mild tremors, he was given another dose of benzodiazepines.  He achieved clinical sobriety, was able to ambulate with a steady gait without assistance, and discharged under care of a family member.     Discharge Exam:  BP (!) 171/88   Pulse 81   Temp 36.7 °C (98 °F) (Temporal)   Resp 18   Ht 1.88 m (6' 2.02\")  Wt 98.4 kg (216 lb 14.9 oz)   SpO2 95%   BMI 27.84 kg/m² .    Constitutional: Awake and alert.   HENT: Normocephalic  Eyes: Bruising around the right eye  Neck: Normal and painless range of motion, supple  Cardiovascular: Regular rhythm, no tachycardia, no significant hypertension  Thorax & Lungs: Normal work of breathing, no tachypnea  Abdomen: Nontender  Skin:  Warm, dry, intact excepting skin tear which is dressed on the left arm.  Extremities: No abnormal swelling nor deformity  Neurologic: No agitation, very minimal tremor with arms extended, no hallucinations, normal speech  Psychiatric: Calm and cooperative      Labs  All labs reviewed    Radiology  CT-CSPINE WITHOUT PLUS RECONS   Final Result         1.  No acute traumatic bony injury of the cervical spine is apparent.   2.  Atherosclerosis      CT-HEAD W/O   Final Result         1.  No acute intracranial abnormality is identified, there are nonspecific white matter changes, commonly associated with small vessel ischemic disease.  Associated mild cerebral atrophy is noted.   2.  Right maxillary sinusitis changes   3.  Atherosclerosis.           My final assessment includes     1. Fall, initial encounter    2. Closed head injury, initial encounter    3. Skin tear of left forearm without complication, initial encounter    4. Alcoholic intoxication without complication (HCC)         Upon Reevaluation, the patient's condition has: Improved; and will be discharged.    Patient discharged from ED Observation status at 12:00 PM, 9/29/2023.    Total time " spent on this ED Observation discharge encounter is 25 minutes    Johnathon TURNER (Scribe), am scribing for, and in the presence of, Mary Lugo M.D..    Electronically signed by: Johnathon Poon (Scribe), 9/29/2023    Mary TURNER M.D. personally performed the services described in this documentation, as scribed by Johnathon Poon in my presence, and it is both accurate and complete.     The note accurately reflects work and decisions made by me.  Mary Lugo M.D.  9/29/2023  2:57 PM

## 2023-09-29 NOTE — ED PROVIDER NOTES
ED Provider Note    CHIEF COMPLAINT  Chief Complaint   Patient presents with    GLF     Pt BIB EMS for MGLF +head strike, +LOC, - Thinners,  + ETOH TBI alert initiated. Pt has a hematoma above Right eyebrow, and multiple skin tears on arms.      EXTERNAL RECORDS REVIEWED  Outpatient Notes June of this year the patient was seen for routine management of chronic conditions.  Hyperlipidemia, prediabetes and incidental small abdominal aortic aneurysm being medically managed    HPI/ROS  LIMITATION TO HISTORY   Select: : None  OUTSIDE HISTORIAN(S):  EMS report given at triage desk    Balwinder Grimes is a 72 y.o. male who presents to the emergency room for evaluation of injury sustained from a ground-level fall.  The patient was a TBI activation by EMS and charged and was seen and evaluated emergently at the charge desk.  Patient is coming from his home in [place] where he had been drinking earlier today, had a mechanical ground-level fall.  There is no known history of blood thinner use or aspirin use, he is foggy and having repetitive questioning and apparently struck something in the bathroom and likely had a loss of consciousness.  He has a large hematoma on the right forehead, some dependent bruising over the eye with scalp skin tear, posterior head pain, left forearm skin tear.  No other acute constitutional complaints at this time.    PAST MEDICAL HISTORY   has a past medical history of High cholesterol, Hypertension, Numbness and tingling, and Pain (08/2018).    SURGICAL HISTORY   has a past surgical history that includes other abdominal surgery (1983); other abdominal surgery (2005); dupuytren contracture release (Right, 2010); other orthopedic surgery (2010); and lumbar decompression (8/21/2018).    FAMILY HISTORY  History reviewed. No pertinent family history.    SOCIAL HISTORY  Social History     Tobacco Use    Smoking status: Former     Current packs/day: 0.00     Average packs/day: 0.5 packs/day for 26.0  "years (13.0 ttl pk-yrs)     Types: Cigarettes     Start date: 8/20/1988     Quit date: 2008     Years since quitting: 15.7    Smokeless tobacco: Never   Vaping Use    Vaping Use: Never used   Substance and Sexual Activity    Alcohol use: Yes     Alcohol/week: 8.4 oz     Types: 14 Glasses of wine per week     Comment: 3-4 per dAY    Drug use: No    Sexual activity: Not on file       CURRENT MEDICATIONS  Home Medications       Reviewed by Jean David RQuetaNQueta (Registered Nurse) on 09/29/23 at 0245  Med List Status: Partial     Medication Last Dose Status   doxazosin (CARDURA) 4 MG Tab  Active   DULoxetine (CYMBALTA) 30 MG Cap DR Particles  Active   gabapentin (NEURONTIN) 300 MG Cap  Active   losartan (COZAAR) 50 MG Tab  Active   rosuvastatin (CRESTOR) 20 MG Tab  Active                    ALLERGIES  No Known Allergies    PHYSICAL EXAM  VITAL SIGNS: BP (!) 147/68   Pulse 76   Temp 36.7 °C (98 °F) (Temporal)   Resp 16   Ht 1.88 m (6' 2.02\")   Wt 98.4 kg (216 lb 14.9 oz)   SpO2 94%   BMI 27.84 kg/m²    General: Alert, Oriented x3. Intoxicated, No acute distress.   Head: Normocephalic, dressing takedown shows right-sided forehead hematoma, apex of the head skin abrasion and right periorbital ecchymoses.  Eyes: Pupils: R: 3 mm, L:3 mm.  Positive nystagmus, EOMI. Sclerae/Conjunctivae normal in appearance. No Raccoon Eyes.   Nose: No septal hematomas.   Ears: No hemotymapnum, no Samuel Sign.   Mouth: No midface instability. No malocclusion.   Neck: No midline tenderness, step-off, or hematoma.   Back: No TTP. No step-off, or hematoma.   Chest: No retractions. Chest wall is non-tender   Lungs: Clear and equal to auscultation bilaterally. No wheezes, rales, or rhonchi. No respiratory distress.   Cardiovascular: Regular Rate and Rhythm. Normal S1 and S2.   Abdomen: Soft, non-distended, non-tender. No rebound or guarding.   Pelvis: Stable   Musculoskeletal: Full active and passive ROM of bilateral shoulders, elbows, " wrists, hips, knees, and ankles without pain or tenderness.  Left forearm has a 2 cm x 2 cm area of superficial skin tear.  No lacerations.  Neuro: A&O x4. Motor: 5/5 to flexion/extension of all 4 extremities.  Appears intoxicated, horizontal nystagmus is noted, no asterixis, sensory intact in all 4 extremities.   Skin: As above    DIAGNOSTIC STUDIES / PROCEDURES  LABS  Labs Reviewed   CBC WITH DIFFERENTIAL - Abnormal; Notable for the following components:       Result Value    WBC 4.0 (*)     RBC 3.99 (*)     Hematocrit 41.7 (*)     .5 (*)     MCH 36.1 (*)     Platelet Count 101 (*)     Neutrophils-Polys 35.70 (*)     Lymphocytes 43.40 (*)     Neutrophils (Absolute) 1.41 (*)     All other components within normal limits    Narrative:     Indicate which anticoagulants the patient is on:->UNKNOWN   COMP METABOLIC PANEL - Abnormal; Notable for the following components:    Glucose 125 (*)     Bun 5 (*)     AST(SGOT) 79 (*)     Alkaline Phosphatase 118 (*)     All other components within normal limits    Narrative:     Indicate which anticoagulants the patient is on:->UNKNOWN   DIAGNOSTIC ALCOHOL - Abnormal; Notable for the following components:    Diagnostic Alcohol 391.3 (*)     All other components within normal limits    Narrative:     Indicate which anticoagulants the patient is on:->UNKNOWN   PROTHROMBIN TIME    Narrative:     Indicate which anticoagulants the patient is on:->UNKNOWN   ESTIMATED GFR    Narrative:     Indicate which anticoagulants the patient is on:->UNKNOWN     RADIOLOGY  I have independently interpreted the diagnostic imaging associated with this visit and am waiting the final reading from the radiologist.   My preliminary interpretation is as follows: No focal traumatic injuries.  Radiologist interpretation:   CT-CSPINE WITHOUT PLUS RECONS   Final Result         1.  No acute traumatic bony injury of the cervical spine is apparent.   2.  Atherosclerosis      CT-HEAD W/O   Final Result          1.  No acute intracranial abnormality is identified, there are nonspecific white matter changes, commonly associated with small vessel ischemic disease.  Associated mild cerebral atrophy is noted.   2.  Right maxillary sinusitis changes   3.  Atherosclerosis.             COURSE & MEDICAL DECISION MAKING    ED Observation Status? Yes; I am placing the patient in to an observation status due to a diagnostic uncertainty as well as therapeutic intensity. Patient placed in observation status at 4:18 AM, 9/29/2023.     Observation plan is as follows: Patient is clinically intoxicated, while no acute emergent surgical process is indicated the patient will need to be observed for metabolization of his alcohol to make sure there is no other evolving traumatic injuries.  Signed out to the oncoming physician.    INITIAL ASSESSMENT, COURSE AND PLAN  Intracranial Hemorrhage  Intra-abdominal Injury  Closed head injury/Concussion  Cervical Spine Fracture/Dislocation or Spinal Cord Injury  Extremity Contusion/Abrasion/Fracture/Dislocation  Laceration/Abrasion    Care Narrative: Patient presents the emergency room for symptoms as described above.  Patient discomfort home, had been drinking and fell.  He has areas of clear traumatic injury to his face with no obvious lacerations.  With his mechanism and concurrent intoxication, he will necessitate advanced medical imaging for assessment of further significant traumatic injuries.    Lab work shows no gross electrolyte abnormalities, glucose is normal, there is nonspecific elevations of AST and alk phos with no signs of hepatobiliary obstructive pathology.  Patient has some leukopenia, decreased platelets and a macrocytic set of changes consistent with chronic alcohol use.    CT scans came back showing no evidence of intracranial bleed thankfully there is no evidence of skull fracture, facial fractures or cervical spine injury.    Serial reassessments were performed and the  patient continues to appear significantly intoxicated.  Alcohol level came back at 391.  Patient likely lives at a higher elevated alcohol level and clinical assessments will be needed to make sure the patient does not have any other evolving traumatic injuries as he becomes more sober.  Patient will be signed out to the oncoming physician pending sober reevaluation and I would anticipate discharge home    DISPOSITION AND DISCUSSIONS  I have discussed management of the patient with the following physicians and KATHYA's:  none    Discussion of management with other QHP or appropriate source(s): None     Escalation of care considered, and ultimately not performed:acute inpatient care management, however at this time, the patient is most appropriate for outpatient management    FINAL DIAGNOSIS  1. Fall, initial encounter    2. Closed head injury, initial encounter    3. Skin tear of left forearm without complication, initial encounter    4. Alcoholic intoxication without complication (HCC)      Electronically signed by: Moustapha Pineda M.D., 9/29/2023 2:31 AM

## 2023-09-29 NOTE — ED NOTES
Bedside report received from off going RN/tech: Samantha, assumed care of patient.  POC discussed with patient. Call light within reach, all needs addressed at this time.       Fall risk interventions in place: Place fall risk sign on patient's door, Give patient urinal if applicable, and Bed Alarm in use (all applicable per [facility] Fall risk assessment)   Continuous monitoring: Cardiac Leads, Pulse Ox, or Blood Pressure  IVF/IV medications: Not Applicable   Oxygen: How many liters 2L  Bedside sitter: Not Applicable   Isolation: Not Applicable

## 2023-09-29 NOTE — ED NOTES
Hourly rounding complete. Pt resting on stretcher. VSS on 2L . Respirations even and non-labored. No s/sx of distress noted. Call bell within reach, side rails up. No complaints at this time.

## 2023-09-29 NOTE — ED NOTES
Hourly rounding complete. Pt resting on stretcher. VSS on RA . Respirations even and non-labored. No s/sx of distress noted. Call bell within reach, side rails up. No complaints at this time.  Update provided to pt son via telephone with pt permission.

## 2023-09-29 NOTE — ED NOTES
"Pt rang out stating he needed to urinate. Pt stated he is a \"fall risk due to neuropathy in feet\". Pt provided with urinal. Pt found to be standing next to bed to use urinal without assistance. Bed alarm placed for safety.  "

## 2023-09-29 NOTE — ED NOTES
Pt skin tear to lt arm irrigated with 500 ml NS. Adaptiq placed and arm wrapped with kerlex. Pt tolerated well.

## 2023-09-29 NOTE — ED NOTES
Pt up with assistance to urinate. Pt still with unsteady gait. Reinforced not getting up on own with pt. Bed alarm on.

## 2023-09-29 NOTE — ED TRIAGE NOTES
Chief Complaint   Patient presents with    GLF     Pt BIB EMS for MGLF +head strike, +LOC, - Thinners,  + ETOH TBI alert initiated. Pt has a hematoma above Right eyebrow, and multiple skin tears on arms.      Vitals:    09/29/23 0242   BP: (!) 172/89   Pulse: 67   SpO2: 95%

## 2023-09-29 NOTE — ED NOTES
Pt provided water per request. Denies further needs. Call light within reach. Bed alarm in place.

## 2023-09-29 NOTE — ED NOTES
Medicated per MAR. Denies further needs at this time. Bed alarm in use. Son Terrell at bedside. Call light within reach.

## 2023-10-04 ENCOUNTER — APPOINTMENT (OUTPATIENT)
Dept: RADIOLOGY | Facility: MEDICAL CENTER | Age: 73
DRG: 896 | End: 2023-10-04
Attending: EMERGENCY MEDICINE
Payer: MEDICARE

## 2023-10-04 ENCOUNTER — HOSPITAL ENCOUNTER (INPATIENT)
Facility: MEDICAL CENTER | Age: 73
LOS: 16 days | DRG: 896 | End: 2023-10-20
Attending: EMERGENCY MEDICINE | Admitting: FAMILY MEDICINE
Payer: MEDICARE

## 2023-10-04 DIAGNOSIS — F10.931 ALCOHOL WITHDRAWAL SYNDROME, WITH DELIRIUM (HCC): ICD-10-CM

## 2023-10-04 DIAGNOSIS — N40.1 BENIGN PROSTATIC HYPERPLASIA WITH URINARY RETENTION: ICD-10-CM

## 2023-10-04 DIAGNOSIS — R55 SYNCOPE, UNSPECIFIED SYNCOPE TYPE: ICD-10-CM

## 2023-10-04 DIAGNOSIS — S09.90XA CLOSED HEAD INJURY, INITIAL ENCOUNTER: ICD-10-CM

## 2023-10-04 DIAGNOSIS — E86.0 DEHYDRATION: ICD-10-CM

## 2023-10-04 DIAGNOSIS — R33.8 BENIGN PROSTATIC HYPERPLASIA WITH URINARY RETENTION: ICD-10-CM

## 2023-10-04 DIAGNOSIS — F10.10 ALCOHOL ABUSE: ICD-10-CM

## 2023-10-04 PROBLEM — E87.6 HYPOKALEMIA: Status: ACTIVE | Noted: 2023-10-04

## 2023-10-04 PROBLEM — G25.0 ESSENTIAL TREMOR: Status: ACTIVE | Noted: 2023-10-04

## 2023-10-04 LAB
ALBUMIN SERPL BCP-MCNC: 3.4 G/DL (ref 3.2–4.9)
ALBUMIN/GLOB SERPL: 1.3 G/DL
ALP SERPL-CCNC: 101 U/L (ref 30–99)
ALT SERPL-CCNC: 29 U/L (ref 2–50)
ANION GAP SERPL CALC-SCNC: 15 MMOL/L (ref 7–16)
AST SERPL-CCNC: 40 U/L (ref 12–45)
BASOPHILS # BLD AUTO: 0.8 % (ref 0–1.8)
BASOPHILS # BLD: 0.05 K/UL (ref 0–0.12)
BILIRUB SERPL-MCNC: 1.3 MG/DL (ref 0.1–1.5)
BUN SERPL-MCNC: 12 MG/DL (ref 8–22)
CALCIUM ALBUM COR SERPL-MCNC: 9.2 MG/DL (ref 8.5–10.5)
CALCIUM SERPL-MCNC: 8.7 MG/DL (ref 8.5–10.5)
CHLORIDE SERPL-SCNC: 102 MMOL/L (ref 96–112)
CO2 SERPL-SCNC: 21 MMOL/L (ref 20–33)
CREAT SERPL-MCNC: 0.83 MG/DL (ref 0.5–1.4)
EOSINOPHIL # BLD AUTO: 0.13 K/UL (ref 0–0.51)
EOSINOPHIL NFR BLD: 2.1 % (ref 0–6.9)
ERYTHROCYTE [DISTWIDTH] IN BLOOD BY AUTOMATED COUNT: 46.2 FL (ref 35.9–50)
GFR SERPLBLD CREATININE-BSD FMLA CKD-EPI: 93 ML/MIN/1.73 M 2
GLOBULIN SER CALC-MCNC: 2.7 G/DL (ref 1.9–3.5)
GLUCOSE SERPL-MCNC: 116 MG/DL (ref 65–99)
HCT VFR BLD AUTO: 36.8 % (ref 42–52)
HGB BLD-MCNC: 12.7 G/DL (ref 14–18)
IMM GRANULOCYTES # BLD AUTO: 0.02 K/UL (ref 0–0.11)
IMM GRANULOCYTES NFR BLD AUTO: 0.3 % (ref 0–0.9)
LACTATE SERPL-SCNC: 0.9 MMOL/L (ref 0.5–2)
LACTATE SERPL-SCNC: 2.5 MMOL/L (ref 0.5–2)
LYMPHOCYTES # BLD AUTO: 0.96 K/UL (ref 1–4.8)
LYMPHOCYTES NFR BLD: 15.8 % (ref 22–41)
MAGNESIUM SERPL-MCNC: 2.1 MG/DL (ref 1.5–2.5)
MCH RBC QN AUTO: 36.1 PG (ref 27–33)
MCHC RBC AUTO-ENTMCNC: 34.5 G/DL (ref 32.3–36.5)
MCV RBC AUTO: 104.5 FL (ref 81.4–97.8)
MONOCYTES # BLD AUTO: 1.24 K/UL (ref 0–0.85)
MONOCYTES NFR BLD AUTO: 20.5 % (ref 0–13.4)
NEUTROPHILS # BLD AUTO: 3.66 K/UL (ref 1.82–7.42)
NEUTROPHILS NFR BLD: 60.5 % (ref 44–72)
NRBC # BLD AUTO: 0 K/UL
NRBC BLD-RTO: 0 /100 WBC (ref 0–0.2)
PLATELET # BLD AUTO: 91 K/UL (ref 164–446)
PLATELETS.RETICULATED NFR BLD AUTO: 6.4 % (ref 0.6–13.1)
PMV BLD AUTO: 10.4 FL (ref 9–12.9)
POTASSIUM SERPL-SCNC: 3.3 MMOL/L (ref 3.6–5.5)
PROT SERPL-MCNC: 6.1 G/DL (ref 6–8.2)
RBC # BLD AUTO: 3.52 M/UL (ref 4.7–6.1)
SODIUM SERPL-SCNC: 138 MMOL/L (ref 135–145)
TROPONIN T SERPL-MCNC: 29 NG/L (ref 6–19)
TROPONIN T SERPL-MCNC: 31 NG/L (ref 6–19)
WBC # BLD AUTO: 6.1 K/UL (ref 4.8–10.8)

## 2023-10-04 PROCEDURE — 700105 HCHG RX REV CODE 258: Performed by: EMERGENCY MEDICINE

## 2023-10-04 PROCEDURE — 770020 HCHG ROOM/CARE - TELE (206)

## 2023-10-04 PROCEDURE — 94760 N-INVAS EAR/PLS OXIMETRY 1: CPT

## 2023-10-04 PROCEDURE — 71045 X-RAY EXAM CHEST 1 VIEW: CPT

## 2023-10-04 PROCEDURE — 85055 RETICULATED PLATELET ASSAY: CPT

## 2023-10-04 PROCEDURE — A9270 NON-COVERED ITEM OR SERVICE: HCPCS

## 2023-10-04 PROCEDURE — 99222 1ST HOSP IP/OBS MODERATE 55: CPT | Mod: AI,GC | Performed by: FAMILY MEDICINE

## 2023-10-04 PROCEDURE — 700111 HCHG RX REV CODE 636 W/ 250 OVERRIDE (IP): Mod: JZ | Performed by: STUDENT IN AN ORGANIZED HEALTH CARE EDUCATION/TRAINING PROGRAM

## 2023-10-04 PROCEDURE — 700102 HCHG RX REV CODE 250 W/ 637 OVERRIDE(OP): Performed by: EMERGENCY MEDICINE

## 2023-10-04 PROCEDURE — 99285 EMERGENCY DEPT VISIT HI MDM: CPT

## 2023-10-04 PROCEDURE — 80053 COMPREHEN METABOLIC PANEL: CPT

## 2023-10-04 PROCEDURE — 700105 HCHG RX REV CODE 258: Performed by: STUDENT IN AN ORGANIZED HEALTH CARE EDUCATION/TRAINING PROGRAM

## 2023-10-04 PROCEDURE — 36415 COLL VENOUS BLD VENIPUNCTURE: CPT

## 2023-10-04 PROCEDURE — 85025 COMPLETE CBC W/AUTO DIFF WBC: CPT

## 2023-10-04 PROCEDURE — 96372 THER/PROPH/DIAG INJ SC/IM: CPT

## 2023-10-04 PROCEDURE — HZ2ZZZZ DETOXIFICATION SERVICES FOR SUBSTANCE ABUSE TREATMENT: ICD-10-PCS | Performed by: FAMILY MEDICINE

## 2023-10-04 PROCEDURE — 83735 ASSAY OF MAGNESIUM: CPT

## 2023-10-04 PROCEDURE — 84484 ASSAY OF TROPONIN QUANT: CPT

## 2023-10-04 PROCEDURE — 72125 CT NECK SPINE W/O DYE: CPT

## 2023-10-04 PROCEDURE — 83605 ASSAY OF LACTIC ACID: CPT | Mod: 91

## 2023-10-04 PROCEDURE — A9270 NON-COVERED ITEM OR SERVICE: HCPCS | Performed by: EMERGENCY MEDICINE

## 2023-10-04 PROCEDURE — 93005 ELECTROCARDIOGRAM TRACING: CPT | Performed by: EMERGENCY MEDICINE

## 2023-10-04 PROCEDURE — 70450 CT HEAD/BRAIN W/O DYE: CPT

## 2023-10-04 PROCEDURE — 87040 BLOOD CULTURE FOR BACTERIA: CPT | Mod: 91

## 2023-10-04 PROCEDURE — 700102 HCHG RX REV CODE 250 W/ 637 OVERRIDE(OP)

## 2023-10-04 RX ORDER — ACETAMINOPHEN 500 MG
1000 TABLET ORAL
COMMUNITY

## 2023-10-04 RX ORDER — LORAZEPAM 2 MG/ML
1 INJECTION INTRAMUSCULAR
Status: DISCONTINUED | OUTPATIENT
Start: 2023-10-04 | End: 2023-10-09

## 2023-10-04 RX ORDER — POTASSIUM CHLORIDE 20 MEQ/1
40 TABLET, EXTENDED RELEASE ORAL ONCE
Status: COMPLETED | OUTPATIENT
Start: 2023-10-04 | End: 2023-10-04

## 2023-10-04 RX ORDER — GAUZE BANDAGE 2" X 2"
100 BANDAGE TOPICAL ONCE
Status: COMPLETED | OUTPATIENT
Start: 2023-10-04 | End: 2023-10-04

## 2023-10-04 RX ORDER — BISACODYL 10 MG
10 SUPPOSITORY, RECTAL RECTAL
Status: DISCONTINUED | OUTPATIENT
Start: 2023-10-04 | End: 2023-10-09

## 2023-10-04 RX ORDER — LORAZEPAM 1 MG/1
1 TABLET ORAL EVERY 4 HOURS PRN
Status: DISCONTINUED | OUTPATIENT
Start: 2023-10-04 | End: 2023-10-09

## 2023-10-04 RX ORDER — ACETAMINOPHEN 325 MG/1
650 TABLET ORAL EVERY 6 HOURS PRN
Status: DISCONTINUED | OUTPATIENT
Start: 2023-10-04 | End: 2023-10-09

## 2023-10-04 RX ORDER — LORAZEPAM 2 MG/1
4 TABLET ORAL
Status: DISCONTINUED | OUTPATIENT
Start: 2023-10-04 | End: 2023-10-09

## 2023-10-04 RX ORDER — POLYETHYLENE GLYCOL 3350 17 G/17G
1 POWDER, FOR SOLUTION ORAL
Status: DISCONTINUED | OUTPATIENT
Start: 2023-10-04 | End: 2023-10-09

## 2023-10-04 RX ORDER — ONDANSETRON 4 MG/1
4 TABLET, ORALLY DISINTEGRATING ORAL EVERY 4 HOURS PRN
Status: DISCONTINUED | OUTPATIENT
Start: 2023-10-04 | End: 2023-10-09

## 2023-10-04 RX ORDER — LORAZEPAM 2 MG/ML
0.5 INJECTION INTRAMUSCULAR EVERY 4 HOURS PRN
Status: DISCONTINUED | OUTPATIENT
Start: 2023-10-04 | End: 2023-10-09

## 2023-10-04 RX ORDER — LOSARTAN POTASSIUM 50 MG/1
50 TABLET ORAL DAILY
Status: DISCONTINUED | OUTPATIENT
Start: 2023-10-05 | End: 2023-10-09

## 2023-10-04 RX ORDER — DOXAZOSIN 2 MG/1
4 TABLET ORAL EVERY EVENING
Status: DISCONTINUED | OUTPATIENT
Start: 2023-10-04 | End: 2023-10-09

## 2023-10-04 RX ORDER — ENOXAPARIN SODIUM 100 MG/ML
40 INJECTION SUBCUTANEOUS DAILY
Status: DISCONTINUED | OUTPATIENT
Start: 2023-10-04 | End: 2023-10-20 | Stop reason: HOSPADM

## 2023-10-04 RX ORDER — SODIUM CHLORIDE 9 MG/ML
1000 INJECTION, SOLUTION INTRAVENOUS ONCE
Status: COMPLETED | OUTPATIENT
Start: 2023-10-04 | End: 2023-10-04

## 2023-10-04 RX ORDER — DULOXETIN HYDROCHLORIDE 30 MG/1
30 CAPSULE, DELAYED RELEASE ORAL DAILY
Status: DISCONTINUED | OUTPATIENT
Start: 2023-10-05 | End: 2023-10-09

## 2023-10-04 RX ORDER — ONDANSETRON 2 MG/ML
4 INJECTION INTRAMUSCULAR; INTRAVENOUS EVERY 4 HOURS PRN
Status: DISCONTINUED | OUTPATIENT
Start: 2023-10-04 | End: 2023-10-20 | Stop reason: HOSPADM

## 2023-10-04 RX ORDER — LORAZEPAM 0.5 MG/1
0.5 TABLET ORAL EVERY 4 HOURS PRN
Status: DISCONTINUED | OUTPATIENT
Start: 2023-10-04 | End: 2023-10-09

## 2023-10-04 RX ORDER — AMOXICILLIN 250 MG
2 CAPSULE ORAL 2 TIMES DAILY
Status: DISCONTINUED | OUTPATIENT
Start: 2023-10-04 | End: 2023-10-09

## 2023-10-04 RX ORDER — LORAZEPAM 2 MG/ML
2 INJECTION INTRAMUSCULAR
Status: DISCONTINUED | OUTPATIENT
Start: 2023-10-04 | End: 2023-10-09

## 2023-10-04 RX ORDER — SODIUM CHLORIDE 9 MG/ML
INJECTION, SOLUTION INTRAVENOUS CONTINUOUS
Status: DISCONTINUED | OUTPATIENT
Start: 2023-10-04 | End: 2023-10-09

## 2023-10-04 RX ORDER — FOLIC ACID 1 MG/1
1 TABLET ORAL DAILY
Status: DISCONTINUED | OUTPATIENT
Start: 2023-10-04 | End: 2023-10-09

## 2023-10-04 RX ORDER — LORAZEPAM 2 MG/ML
1.5 INJECTION INTRAMUSCULAR
Status: DISCONTINUED | OUTPATIENT
Start: 2023-10-04 | End: 2023-10-09

## 2023-10-04 RX ORDER — IBUPROFEN 200 MG
800 TABLET ORAL
Status: ON HOLD | COMMUNITY
End: 2023-10-20

## 2023-10-04 RX ORDER — LABETALOL HYDROCHLORIDE 5 MG/ML
10 INJECTION, SOLUTION INTRAVENOUS EVERY 4 HOURS PRN
Status: DISCONTINUED | OUTPATIENT
Start: 2023-10-04 | End: 2023-10-05

## 2023-10-04 RX ORDER — ROSUVASTATIN CALCIUM 10 MG/1
20 TABLET, COATED ORAL EVERY EVENING
Status: DISCONTINUED | OUTPATIENT
Start: 2023-10-04 | End: 2023-10-09

## 2023-10-04 RX ORDER — LORAZEPAM 2 MG/1
2 TABLET ORAL
Status: DISCONTINUED | OUTPATIENT
Start: 2023-10-04 | End: 2023-10-09

## 2023-10-04 RX ADMIN — THERA TABS 1 TABLET: TAB at 18:48

## 2023-10-04 RX ADMIN — ROSUVASTATIN CALCIUM 20 MG: 20 TABLET, FILM COATED ORAL at 23:09

## 2023-10-04 RX ADMIN — Medication 100 MG: at 18:48

## 2023-10-04 RX ADMIN — FOLIC ACID 1 MG: 1 TABLET ORAL at 18:48

## 2023-10-04 RX ADMIN — ENOXAPARIN SODIUM 40 MG: 100 INJECTION SUBCUTANEOUS at 18:51

## 2023-10-04 RX ADMIN — POTASSIUM CHLORIDE 40 MEQ: 1500 TABLET, EXTENDED RELEASE ORAL at 23:09

## 2023-10-04 RX ADMIN — SODIUM CHLORIDE: 9 INJECTION, SOLUTION INTRAVENOUS at 19:40

## 2023-10-04 RX ADMIN — SODIUM CHLORIDE 1000 ML: 9 INJECTION, SOLUTION INTRAVENOUS at 17:15

## 2023-10-04 ASSESSMENT — COGNITIVE AND FUNCTIONAL STATUS - GENERAL
MOBILITY SCORE: 18
MOVING FROM LYING ON BACK TO SITTING ON SIDE OF FLAT BED: A LITTLE
DRESSING REGULAR LOWER BODY CLOTHING: A LITTLE
EATING MEALS: A LITTLE
DAILY ACTIVITIY SCORE: 18
WALKING IN HOSPITAL ROOM: A LITTLE
STANDING UP FROM CHAIR USING ARMS: A LITTLE
SUGGESTED CMS G CODE MODIFIER DAILY ACTIVITY: CK
CLIMB 3 TO 5 STEPS WITH RAILING: A LOT
MOVING TO AND FROM BED TO CHAIR: A LITTLE
PERSONAL GROOMING: A LITTLE
TOILETING: A LITTLE
SUGGESTED CMS G CODE MODIFIER MOBILITY: CK
DRESSING REGULAR UPPER BODY CLOTHING: A LITTLE
HELP NEEDED FOR BATHING: A LITTLE

## 2023-10-04 ASSESSMENT — LIFESTYLE VARIABLES
TOTAL SCORE: 0
NAUSEA AND VOMITING: NO NAUSEA AND NO VOMITING
HAVE YOU EVER FELT YOU SHOULD CUT DOWN ON YOUR DRINKING: NO
TREMOR: MODERATE TREMOR WITH ARMS EXTENDED
AUDITORY DISTURBANCES: NOT PRESENT
ANXIETY: NO ANXIETY (AT EASE)
NAUSEA AND VOMITING: NO NAUSEA AND NO VOMITING
VISUAL DISTURBANCES: NOT PRESENT
TOTAL SCORE: 0
TOTAL SCORE: 4
AUDITORY DISTURBANCES: NOT PRESENT
ON A TYPICAL DAY WHEN YOU DRINK ALCOHOL HOW MANY DRINKS DO YOU HAVE: 0
ALCOHOL_USE: NO
HAVE PEOPLE ANNOYED YOU BY CRITICIZING YOUR DRINKING: NO
AGITATION: NORMAL ACTIVITY
VISUAL DISTURBANCES: NOT PRESENT
ORIENTATION AND CLOUDING OF SENSORIUM: ORIENTED AND CAN DO SERIAL ADDITIONS
CONSUMPTION TOTAL: NEGATIVE
PAROXYSMAL SWEATS: NO SWEAT VISIBLE
HOW MANY TIMES IN THE PAST YEAR HAVE YOU HAD 5 OR MORE DRINKS IN A DAY: 0
EVER HAD A DRINK FIRST THING IN THE MORNING TO STEADY YOUR NERVES TO GET RID OF A HANGOVER: NO
AGITATION: NORMAL ACTIVITY
HEADACHE, FULLNESS IN HEAD: NOT PRESENT
TOTAL SCORE: 0
ANXIETY: NO ANXIETY (AT EASE)
ORIENTATION AND CLOUDING OF SENSORIUM: ORIENTED AND CAN DO SERIAL ADDITIONS
HEADACHE, FULLNESS IN HEAD: NOT PRESENT
PAROXYSMAL SWEATS: NO SWEAT VISIBLE
AVERAGE NUMBER OF DAYS PER WEEK YOU HAVE A DRINK CONTAINING ALCOHOL: 0
EVER FELT BAD OR GUILTY ABOUT YOUR DRINKING: NO
TOTAL SCORE: 4
TREMOR: MODERATE TREMOR WITH ARMS EXTENDED

## 2023-10-04 ASSESSMENT — FIBROSIS 4 INDEX
FIB4 SCORE: 8.05
FIB4 SCORE: 5.88

## 2023-10-04 ASSESSMENT — PATIENT HEALTH QUESTIONNAIRE - PHQ9
4. FEELING TIRED OR HAVING LITTLE ENERGY: NOT AT ALL
9. THOUGHTS THAT YOU WOULD BE BETTER OFF DEAD, OR OF HURTING YOURSELF: NOT AT ALL
3. TROUBLE FALLING OR STAYING ASLEEP OR SLEEPING TOO MUCH: NEARLY EVERY DAY
8. MOVING OR SPEAKING SO SLOWLY THAT OTHER PEOPLE COULD HAVE NOTICED. OR THE OPPOSITE, BEING SO FIGETY OR RESTLESS THAT YOU HAVE BEEN MOVING AROUND A LOT MORE THAN USUAL: NOT AT ALL
SUM OF ALL RESPONSES TO PHQ QUESTIONS 1-9: 9
6. FEELING BAD ABOUT YOURSELF - OR THAT YOU ARE A FAILURE OR HAVE LET YOURSELF OR YOUR FAMILY DOWN: NOT AL ALL
7. TROUBLE CONCENTRATING ON THINGS, SUCH AS READING THE NEWSPAPER OR WATCHING TELEVISION: NOT AT ALL
1. LITTLE INTEREST OR PLEASURE IN DOING THINGS: NEARLY EVERY DAY
SUM OF ALL RESPONSES TO PHQ9 QUESTIONS 1 AND 2: 6
2. FEELING DOWN, DEPRESSED, IRRITABLE, OR HOPELESS: NEARLY EVERY DAY
5. POOR APPETITE OR OVEREATING: NOT AT ALL

## 2023-10-04 NOTE — ED TRIAGE NOTES
TBI Alert    Pt BIB [facility] for syncopal episode.  Chief Complaint   Patient presents with    Syncope     Pt experienced a syncopal episode hittin aminahjanell R eyebrow on the coffee table. Pt reports increase in dizziness over the last 2 weeks. Pt takes BP medication regularly, initial SBP-98. GCS15     Pt reports he recently DC's from detox center for ETOH abuse, pt has been sober x 10 days.    Vitals:    10/04/23 1527   BP: 117/66   Pulse: 64   Resp: 15   Temp: 36.7 °C (98.1 °F)   SpO2: 97%

## 2023-10-04 NOTE — ED PROVIDER NOTES
ER Provider Note    Scribed for Elia Hart M.D. by Johnathon Poon. 10/4/2023   3:15 PM    Primary Care Provider: Sandra Moreau P.A.-C.    CHIEF COMPLAINT  Chief Complaint   Patient presents with    Syncope     Pt experienced a syncopal episode hittin charly LEONG eyebrow on the coffee table. Pt reports increase in dizziness over the last 2 weeks. Pt takes BP medication regularly, initial SBP-98. GCS15     EXTERNAL RECORDS REVIEWED  Other Patient presented to the ED on 9/29/23 after a ground level fall with subsequent head injury. The patient's alcohol level was found to be 0.39 at this time.    HPI/ROS  LIMITATION TO HISTORY   Select: Patient cannot recall entire episode today    OUTSIDE HISTORIAN(S):  EMS contributed to history as detailed below    Balwinder Grimes is a 72 y.o. male who presents to the ED via EMS as a code TBI, onset prior to arrival. Per EMS the patient was at home today when he had a syncopal episode while in his living room, causing him to fall and hit his head on the coffee table. He was also noted to have abrasions to the left elbow and some tremors to bilateral hands upon EMS arrival. The patient does not recall the episode, or much of the events leading up to it. However, he does report for the past few weeks he has been feeling dizzy when standing up after sitting for long periods of time. He is currently taking Losartan and Doxazosin, and notes he doubled his dose of Doxazosin a few days ago (4 mg to 8 mg). However, he was feeling dizzy prior to this. He reports no other recent medication changes and he is not on blood thinners. He also states he has been drinking plenty of fluids and eating well recently. However, he did have another ground level fall las week. EMS reports last week the patient fell in his bathroom in the early morning, around 2:30 AM and hit his face, sustaining a black eye to the right eye. He was evaluated in the ED and his wife reported he was drinking heavily at  "this time.       PAST MEDICAL HISTORY  Past Medical History:   Diagnosis Date    High cholesterol     Hypertension     Numbness and tingling     intermittent lower left leg    Pain 08/2018    low back, bilateral legs       SURGICAL HISTORY  Past Surgical History:   Procedure Laterality Date    LUMBAR DECOMPRESSION  8/21/2018    Procedure: LUMBAR DECOMPRESSION- POSTERIOR L3-4;  Surgeon: Laxmi Starr M.D.;  Location: SURGERY [facility];  Service: Orthopedics    DUPUYTREN CONTRACTURE RELEASE Right 2010    hand    OTHER ORTHOPEDIC SURGERY  2010    right hand Dupuytren's contracture    OTHER ABDOMINAL SURGERY  2005    Cholecystectomy    OTHER ABDOMINAL SURGERY  1983    APPY       FAMILY HISTORY  No family history reported.     SOCIAL HISTORY   reports that he quit smoking about 15 years ago. His smoking use included cigarettes. He started smoking about 35 years ago. He has a 13.0 pack-year smoking history. He has never used smokeless tobacco. He reports that he does not currently use alcohol after a past usage of about 8.4 oz of alcohol per week. He reports that he does not use drugs.    CURRENT MEDICATIONS  Previous Medications    DOXAZOSIN (CARDURA) 4 MG TAB    Take 1 Tablet by mouth every evening.    DULOXETINE (CYMBALTA) 30 MG CAP DR PARTICLES    Take 1 Capsule by mouth every day.    LOSARTAN (COZAAR) 50 MG TAB    Take 1 Tablet by mouth every day.    ROSUVASTATIN (CRESTOR) 20 MG TAB    Take 1 Tablet by mouth every evening.       ALLERGIES  No Known Allergies     PHYSICAL EXAM  /69   Pulse 65   Temp 36.7 °C (98.1 °F) (Temporal)   Resp 20   Ht 1.88 m (6' 2\")   Wt 97.5 kg (215 lb)   SpO2 94%   BMI 27.60 kg/m²    Constitutional: Well developed, Well nourished, No distress    HENT: Normocephalic, Old appearing ecchymosis to the right forehead and eye.  Slight abrasion of the right forehead  Eyes: PERRLA, EOMI, Conjunctiva normal, No discharge.   Neck: Mild midline c-spine tenderness, Supple, No stridor. "   Cardiovascular: Normal heart rate, Normal rhythm.   Thorax & Lungs: Clear to auscultation bilaterally, No respiratory distress, No wheezing, No crackles.   Abdomen: Soft, No tenderness, No masses, No pulsatile masses.   Skin: Skin tears on the left elbow, Warm, Dry, No rash.    Musculoskeletal: No major deformities noted.  Intact distal pulses  Neurologic: Awake, alert. Moves all extremities spontaneously.  Psychiatric: Affect normal, Judgment normal, Mood normal.        DIAGNOSTIC STUDIES    Labs:   Results for orders placed or performed during the hospital encounter of 10/04/23   LACTIC ACID   Result Value Ref Range    Lactic Acid 2.5 (H) 0.5 - 2.0 mmol/L   LACTIC ACID   Result Value Ref Range    Lactic Acid 0.9 0.5 - 2.0 mmol/L   CBC WITH DIFFERENTIAL   Result Value Ref Range    WBC 6.1 4.8 - 10.8 K/uL    RBC 3.52 (L) 4.70 - 6.10 M/uL    Hemoglobin 12.7 (L) 14.0 - 18.0 g/dL    Hematocrit 36.8 (L) 42.0 - 52.0 %    .5 (H) 81.4 - 97.8 fL    MCH 36.1 (H) 27.0 - 33.0 pg    MCHC 34.5 32.3 - 36.5 g/dL    RDW 46.2 35.9 - 50.0 fL    Platelet Count 91 (L) 164 - 446 K/uL    MPV 10.4 9.0 - 12.9 fL    Neutrophils-Polys 60.50 44.00 - 72.00 %    Lymphocytes 15.80 (L) 22.00 - 41.00 %    Monocytes 20.50 (H) 0.00 - 13.40 %    Eosinophils 2.10 0.00 - 6.90 %    Basophils 0.80 0.00 - 1.80 %    Immature Granulocytes 0.30 0.00 - 0.90 %    Nucleated RBC 0.00 0.00 - 0.20 /100 WBC    Neutrophils (Absolute) 3.66 1.82 - 7.42 K/uL    Lymphs (Absolute) 0.96 (L) 1.00 - 4.80 K/uL    Monos (Absolute) 1.24 (H) 0.00 - 0.85 K/uL    Eos (Absolute) 0.13 0.00 - 0.51 K/uL    Baso (Absolute) 0.05 0.00 - 0.12 K/uL    Immature Granulocytes (abs) 0.02 0.00 - 0.11 K/uL    NRBC (Absolute) 0.00 K/uL   COMP METABOLIC PANEL   Result Value Ref Range    Sodium 138 135 - 145 mmol/L    Potassium 3.3 (L) 3.6 - 5.5 mmol/L    Chloride 102 96 - 112 mmol/L    Co2 21 20 - 33 mmol/L    Anion Gap 15.0 7.0 - 16.0    Glucose 116 (H) 65 - 99 mg/dL    Bun 12 8 - 22  mg/dL    Creatinine 0.83 0.50 - 1.40 mg/dL    Calcium 8.7 8.5 - 10.5 mg/dL    Correct Calcium 9.2 8.5 - 10.5 mg/dL    AST(SGOT) 40 12 - 45 U/L    ALT(SGPT) 29 2 - 50 U/L    Alkaline Phosphatase 101 (H) 30 - 99 U/L    Total Bilirubin 1.3 0.1 - 1.5 mg/dL    Albumin 3.4 3.2 - 4.9 g/dL    Total Protein 6.1 6.0 - 8.2 g/dL    Globulin 2.7 1.9 - 3.5 g/dL    A-G Ratio 1.3 g/dL   Troponin   Result Value Ref Range    Troponin T 29 (H) 6 - 19 ng/L   IMMATURE PLT FRACTION   Result Value Ref Range    Imm. Plt Fraction 6.4 0.6 - 13.1 %   ESTIMATED GFR   Result Value Ref Range    GFR (CKD-EPI) 93 >60 mL/min/1.73 m 2   MAGNESIUM   Result Value Ref Range    Magnesium 2.1 1.5 - 2.5 mg/dL   EKG   Result Value Ref Range    Report       [facility] Emergency Dept.    Test Date:  2023-10-04  Pt Name:    PER FLOYD               Department: ER  MRN:        [id number]                      Room:       [facility]  Gender:     Male                         Technician: [id number]  :        1950                   Requested By:HAYDEN BOLIVAR  Order #:    [id number]                    Reading MD:    Measurements  Intervals                                Axis  Rate:       63                           P:          47  OH:         203                          QRS:        42  QRSD:       102                          T:          78  QT:         464  QTc:        476    Interpretive Statements  Sinus rhythm  Borderline low voltage, extremity leads  No previous ECG available for comparison         Radiology:   This attending emergency physician has independently interpreted the diagnostic imaging associated with this visit and is awaiting the final reading from the radiologist.   Preliminary interpretation is a follows: No intracranial bleeding  Radiologist interpretation:   DX-CHEST-PORTABLE (1 VIEW)   Final Result      No acute cardiopulmonary abnormality.      CT-CSPINE WITHOUT PLUS RECONS   Final Result      No acute fracture or  dislocation of the cervical spine.      CT-HEAD W/O   Final Result      1.  Cerebral atrophy.      2.  White matter lucencies most consistent with small vessel ischemic change versus demyelination or gliosis.      3.  Otherwise, Head CT without contrast with no acute findings. No evidence of acute cerebral infarction, hemorrhage or mass lesion.                COURSE & MEDICAL DECISION MAKING     ED Observation Status? Yes; I am placing the patient in to an observation status due to a diagnostic uncertainty as well as therapeutic intensity. Patient placed in observation status at 3:23 PM, 10/4/2023.     Observation plan is as follows: Monitor for symptom management and diagnostic results.     Upon Reevaluation, the patient's condition has: not improved; and will be escalated to hospitalization.    Patient discharged from ED Observation status at 5:10 PM, 10/4/2023.    INITIAL ASSESSMENT, COURSE AND PLAN    Care Narrative: Patient coming in after a head injury.  The patient had syncopal event hitting his head.  Apparently the patient was seen here approximately 10 days ago and also had a fall.  At that time the patient's blood alcohol was very elevated at 0.33.  Patient has not drinking alcohol since then.  I am concerned about the patient's hypotension when he stands.  The patient was hypotensive in the field, give the patient IV fluids.  The patient is feeling improved.  Due to the patient's syncope I believe the patient should be admitted to the hospital, discussed case with the hospitalist for hospitalization.    3:15 PM - Patient was evaluated at charge desk as a code TBI. Ordered for EKG, DX-Chest, CT-Cspine, CT-Head without, Lactic acid, CBC with differential, CMP, UA, urine culture, Troponin, and Blood culture x2 to evaluate. Patient verbalizes understanding and support with my plan of care.     4:50 PM Patient will be treated with NS infusion 1,000 mL, Folvite tablet 1 mg, Thiamine tablet 100 mg, and  Multivitamin tablet.     4:58 PM Paged Hospitalist.     5:02 PM - I reevaluated the patient at bedside. The patient informs me at this time he has not drank any alcohol since his previous fall 5 days ago. At this time the patient also states he has only been taking an increased dose of Doxazosin for the past few days, not the past week. He also notes he remembers speaking with me and the initial evaluation at charge desk.     I discussed the patient's diagnostic study results. I discussed plan for discharge and follow up as outlined below. The patient is stable for discharge at this time and will return for any new or worsening symptoms. Patient verbalizes understanding and support with my plan for discharge.      5:10 PM I discussed the patient's case and the above findings with Dr. Pope (Hospitalist) who agrees to evaluate the patient for hospitalization.     HYDRATION: Based on the patient's presentation of Dehydration and Other Syncope the patient was given IV fluids. IV Hydration was used because oral hydration was not adequate alone. Upon recheck following hydration, the patient was improved.       DISPOSITION AND DISCUSSIONS    I have discussed management of the patient with the following physicians and KATHYA's:  Dr. Pope (Hospitalist)     Discussion of management with other \[facility] [place] [facility]\"" or appropriate source(s): None     DISPOSITION:  Patient will be hospitalized by Dr. Pope in guarded condition.    FINAL DIAGNOSIS  1. Syncope, unspecified syncope type    2. Closed head injury, initial encounter    3. Alcohol abuse    4. Dehydration         Johnathon TURNER (Tommie), am scribing for, and in the presence of, Elia Hart M.D..    Electronically signed by: Johnathon Poon (Tommie), 10/4/2023    Elia TURNER M.D. personally performed the services described in this documentation, as scribed by Johnathon Poon in my presence, and it is both accurate and complete.      The note accurately reflects work and  decisions made by me.  Elia Hart M.D.  10/4/2023  9:31 PM

## 2023-10-05 PROBLEM — D69.6 THROMBOCYTOPENIA (HCC): Status: ACTIVE | Noted: 2023-10-05

## 2023-10-05 PROBLEM — R79.89 ELEVATED TROPONIN: Status: ACTIVE | Noted: 2023-10-05

## 2023-10-05 PROBLEM — D53.9 MACROCYTIC ANEMIA: Status: ACTIVE | Noted: 2023-10-05

## 2023-10-05 LAB
ALBUMIN SERPL BCP-MCNC: 3.6 G/DL (ref 3.2–4.9)
ALBUMIN/GLOB SERPL: 1.6 G/DL
ALP SERPL-CCNC: 99 U/L (ref 30–99)
ALT SERPL-CCNC: 26 U/L (ref 2–50)
ANION GAP SERPL CALC-SCNC: 12 MMOL/L (ref 7–16)
APPEARANCE UR: CLEAR
AST SERPL-CCNC: 33 U/L (ref 12–45)
BACTERIA #/AREA URNS HPF: NEGATIVE /HPF
BILIRUB SERPL-MCNC: 1.3 MG/DL (ref 0.1–1.5)
BILIRUB UR QL STRIP.AUTO: NEGATIVE
BUN SERPL-MCNC: 12 MG/DL (ref 8–22)
CALCIUM ALBUM COR SERPL-MCNC: 8.6 MG/DL (ref 8.5–10.5)
CALCIUM SERPL-MCNC: 8.3 MG/DL (ref 8.5–10.5)
CHLORIDE SERPL-SCNC: 103 MMOL/L (ref 96–112)
CO2 SERPL-SCNC: 23 MMOL/L (ref 20–33)
COLOR UR: ABNORMAL
CREAT SERPL-MCNC: 0.76 MG/DL (ref 0.5–1.4)
EPI CELLS #/AREA URNS HPF: NEGATIVE /HPF
ERYTHROCYTE [DISTWIDTH] IN BLOOD BY AUTOMATED COUNT: 45.1 FL (ref 35.9–50)
EST. AVERAGE GLUCOSE BLD GHB EST-MCNC: 114 MG/DL
GFR SERPLBLD CREATININE-BSD FMLA CKD-EPI: 95 ML/MIN/1.73 M 2
GLOBULIN SER CALC-MCNC: 2.3 G/DL (ref 1.9–3.5)
GLUCOSE SERPL-MCNC: 105 MG/DL (ref 65–99)
GLUCOSE UR STRIP.AUTO-MCNC: NEGATIVE MG/DL
HBA1C MFR BLD: 5.6 % (ref 4–5.6)
HCT VFR BLD AUTO: 35.5 % (ref 42–52)
HGB BLD-MCNC: 12.3 G/DL (ref 14–18)
HYALINE CASTS #/AREA URNS LPF: ABNORMAL /LPF
KETONES UR STRIP.AUTO-MCNC: 15 MG/DL
LEUKOCYTE ESTERASE UR QL STRIP.AUTO: NEGATIVE
MCH RBC QN AUTO: 36.4 PG (ref 27–33)
MCHC RBC AUTO-ENTMCNC: 34.6 G/DL (ref 32.3–36.5)
MCV RBC AUTO: 105 FL (ref 81.4–97.8)
MICRO URNS: ABNORMAL
NITRITE UR QL STRIP.AUTO: NEGATIVE
PH UR STRIP.AUTO: 6 [PH] (ref 5–8)
PLATELET # BLD AUTO: 91 K/UL (ref 164–446)
PLATELETS.RETICULATED NFR BLD AUTO: 6.2 % (ref 0.6–13.1)
PMV BLD AUTO: 10.3 FL (ref 9–12.9)
POTASSIUM SERPL-SCNC: 3.4 MMOL/L (ref 3.6–5.5)
PROT SERPL-MCNC: 5.9 G/DL (ref 6–8.2)
PROT UR QL STRIP: NEGATIVE MG/DL
RBC # BLD AUTO: 3.38 M/UL (ref 4.7–6.1)
RBC # URNS HPF: ABNORMAL /HPF
RBC UR QL AUTO: ABNORMAL
SODIUM SERPL-SCNC: 138 MMOL/L (ref 135–145)
SP GR UR STRIP.AUTO: 1.01
UROBILINOGEN UR STRIP.AUTO-MCNC: 1 MG/DL
WBC # BLD AUTO: 5.4 K/UL (ref 4.8–10.8)
WBC #/AREA URNS HPF: ABNORMAL /HPF

## 2023-10-05 PROCEDURE — A9270 NON-COVERED ITEM OR SERVICE: HCPCS | Performed by: BEHAVIOR ANALYST

## 2023-10-05 PROCEDURE — 700102 HCHG RX REV CODE 250 W/ 637 OVERRIDE(OP)

## 2023-10-05 PROCEDURE — 700111 HCHG RX REV CODE 636 W/ 250 OVERRIDE (IP): Performed by: STUDENT IN AN ORGANIZED HEALTH CARE EDUCATION/TRAINING PROGRAM

## 2023-10-05 PROCEDURE — A9270 NON-COVERED ITEM OR SERVICE: HCPCS | Performed by: STUDENT IN AN ORGANIZED HEALTH CARE EDUCATION/TRAINING PROGRAM

## 2023-10-05 PROCEDURE — 80053 COMPREHEN METABOLIC PANEL: CPT

## 2023-10-05 PROCEDURE — 700105 HCHG RX REV CODE 258: Performed by: STUDENT IN AN ORGANIZED HEALTH CARE EDUCATION/TRAINING PROGRAM

## 2023-10-05 PROCEDURE — 85055 RETICULATED PLATELET ASSAY: CPT

## 2023-10-05 PROCEDURE — 700102 HCHG RX REV CODE 250 W/ 637 OVERRIDE(OP): Performed by: STUDENT IN AN ORGANIZED HEALTH CARE EDUCATION/TRAINING PROGRAM

## 2023-10-05 PROCEDURE — 36415 COLL VENOUS BLD VENIPUNCTURE: CPT

## 2023-10-05 PROCEDURE — A9270 NON-COVERED ITEM OR SERVICE: HCPCS

## 2023-10-05 PROCEDURE — 83036 HEMOGLOBIN GLYCOSYLATED A1C: CPT

## 2023-10-05 PROCEDURE — 97162 PT EVAL MOD COMPLEX 30 MIN: CPT

## 2023-10-05 PROCEDURE — A9270 NON-COVERED ITEM OR SERVICE: HCPCS | Performed by: EMERGENCY MEDICINE

## 2023-10-05 PROCEDURE — 87086 URINE CULTURE/COLONY COUNT: CPT

## 2023-10-05 PROCEDURE — 700102 HCHG RX REV CODE 250 W/ 637 OVERRIDE(OP): Performed by: EMERGENCY MEDICINE

## 2023-10-05 PROCEDURE — 700102 HCHG RX REV CODE 250 W/ 637 OVERRIDE(OP): Performed by: BEHAVIOR ANALYST

## 2023-10-05 PROCEDURE — 770020 HCHG ROOM/CARE - TELE (206)

## 2023-10-05 PROCEDURE — 85027 COMPLETE CBC AUTOMATED: CPT

## 2023-10-05 PROCEDURE — 81001 URINALYSIS AUTO W/SCOPE: CPT

## 2023-10-05 PROCEDURE — 99232 SBSQ HOSP IP/OBS MODERATE 35: CPT | Mod: GC | Performed by: FAMILY MEDICINE

## 2023-10-05 RX ORDER — CHLORDIAZEPOXIDE HYDROCHLORIDE 25 MG/1
50 CAPSULE, GELATIN COATED ORAL EVERY 6 HOURS
Status: DISPENSED | OUTPATIENT
Start: 2023-10-05 | End: 2023-10-06

## 2023-10-05 RX ORDER — CHLORDIAZEPOXIDE HYDROCHLORIDE 25 MG/1
25 CAPSULE, GELATIN COATED ORAL EVERY 6 HOURS
Status: DISPENSED | OUTPATIENT
Start: 2023-10-06 | End: 2023-10-08

## 2023-10-05 RX ORDER — LABETALOL HYDROCHLORIDE 5 MG/ML
10 INJECTION, SOLUTION INTRAVENOUS
Status: DISCONTINUED | OUTPATIENT
Start: 2023-10-05 | End: 2023-10-09

## 2023-10-05 RX ADMIN — CHLORDIAZEPOXIDE HYDROCHLORIDE 50 MG: 25 CAPSULE ORAL at 17:21

## 2023-10-05 RX ADMIN — LORAZEPAM 2 MG: 2 TABLET ORAL at 20:11

## 2023-10-05 RX ADMIN — LORAZEPAM 1 MG: 1 TABLET ORAL at 08:47

## 2023-10-05 RX ADMIN — SODIUM CHLORIDE: 9 INJECTION, SOLUTION INTRAVENOUS at 20:09

## 2023-10-05 RX ADMIN — LORAZEPAM 1 MG: 1 TABLET ORAL at 22:28

## 2023-10-05 RX ADMIN — LORAZEPAM 2 MG: 2 TABLET ORAL at 12:50

## 2023-10-05 RX ADMIN — LORAZEPAM 2 MG: 2 TABLET ORAL at 17:21

## 2023-10-05 RX ADMIN — LOSARTAN POTASSIUM 50 MG: 50 TABLET, FILM COATED ORAL at 06:02

## 2023-10-05 RX ADMIN — DOXAZOSIN 4 MG: 2 TABLET ORAL at 17:21

## 2023-10-05 RX ADMIN — SODIUM CHLORIDE: 9 INJECTION, SOLUTION INTRAVENOUS at 03:51

## 2023-10-05 RX ADMIN — ENOXAPARIN SODIUM 40 MG: 100 INJECTION SUBCUTANEOUS at 17:21

## 2023-10-05 RX ADMIN — DULOXETINE HYDROCHLORIDE 30 MG: 30 CAPSULE, DELAYED RELEASE ORAL at 06:02

## 2023-10-05 RX ADMIN — SENNOSIDES AND DOCUSATE SODIUM 2 TABLET: 50; 8.6 TABLET ORAL at 06:03

## 2023-10-05 RX ADMIN — ROSUVASTATIN CALCIUM 20 MG: 20 TABLET, FILM COATED ORAL at 17:21

## 2023-10-05 RX ADMIN — FOLIC ACID 1 MG: 1 TABLET ORAL at 06:03

## 2023-10-05 RX ADMIN — LORAZEPAM 2 MG: 2 INJECTION INTRAMUSCULAR; INTRAVENOUS at 13:30

## 2023-10-05 ASSESSMENT — LIFESTYLE VARIABLES
PAROXYSMAL SWEATS: *
HEADACHE, FULLNESS IN HEAD: NOT PRESENT
AUDITORY DISTURBANCES: NOT PRESENT
TREMOR: MODERATE TREMOR WITH ARMS EXTENDED
ORIENTATION AND CLOUDING OF SENSORIUM: DATE DISORIENTATION BY MORE THAN TWO CALENDAR DAYS
AGITATION: MODERATELY FIDGETY AND RESTLESS
HEADACHE, FULLNESS IN HEAD: NOT PRESENT
NAUSEA AND VOMITING: NO NAUSEA AND NO VOMITING
TOTAL SCORE: 4
PAROXYSMAL SWEATS: NO SWEAT VISIBLE
ANXIETY: *
AGITATION: *
VISUAL DISTURBANCES: SEVERE HALLUCINATIONS
HEADACHE, FULLNESS IN HEAD: NOT PRESENT
AGITATION: SOMEWHAT MORE THAN NORMAL ACTIVITY
TREMOR: MODERATE TREMOR WITH ARMS EXTENDED
ORIENTATION AND CLOUDING OF SENSORIUM: ORIENTED AND CAN DO SERIAL ADDITIONS
TOTAL SCORE: 14
AUDITORY DISTURBANCES: VERY MILD HARSHNESS OR ABILITY TO FRIGHTEN
NAUSEA AND VOMITING: NO NAUSEA AND NO VOMITING
ORIENTATION AND CLOUDING OF SENSORIUM: DATE DISORIENTATION BY NO MORE THAN TWO CALENDAR DAYS
TREMOR: SEVERE TREMOR, EVEN WITH ARMS NOT EXTENDED
ANXIETY: MILDLY ANXIOUS
AGITATION: NORMAL ACTIVITY
PAROXYSMAL SWEATS: BARELY PERCEPTIBLE SWEATING. PALMS MOIST
ANXIETY: MODERATELY ANXIOUS OR GUARDED, SO ANXIETY IS INFERRED
TOTAL SCORE: 8
TOTAL SCORE: 21
NAUSEA AND VOMITING: NO NAUSEA AND NO VOMITING
ANXIETY: *
AGITATION: *
AUDITORY DISTURBANCES: NOT PRESENT
HEADACHE, FULLNESS IN HEAD: NOT PRESENT
VISUAL DISTURBANCES: MILD SENSITIVITY
AUDITORY DISTURBANCES: NOT PRESENT
ANXIETY: *
AUDITORY DISTURBANCES: NOT PRESENT
AGITATION: MODERATELY FIDGETY AND RESTLESS
PAROXYSMAL SWEATS: BARELY PERCEPTIBLE SWEATING. PALMS MOIST
ANXIETY: MODERATELY ANXIOUS OR GUARDED, SO ANXIETY IS INFERRED
TOTAL SCORE: 12
TOTAL SCORE: VERY MILD ITCHING, PINS AND NEEDLES SENSATION, BURNING OR NUMBNESS
HEADACHE, FULLNESS IN HEAD: NOT PRESENT
AGITATION: SOMEWHAT MORE THAN NORMAL ACTIVITY
ANXIETY: *
TOTAL SCORE: 9
AUDITORY DISTURBANCES: NOT PRESENT
PAROXYSMAL SWEATS: BARELY PERCEPTIBLE SWEATING. PALMS MOIST
VISUAL DISTURBANCES: MODERATE SENSITIVITY
ORIENTATION AND CLOUDING OF SENSORIUM: ORIENTED AND CAN DO SERIAL ADDITIONS
HEADACHE, FULLNESS IN HEAD: NOT PRESENT
HEADACHE, FULLNESS IN HEAD: NOT PRESENT
NAUSEA AND VOMITING: NO NAUSEA AND NO VOMITING
AUDITORY DISTURBANCES: NOT PRESENT
AUDITORY DISTURBANCES: NOT PRESENT
ORIENTATION AND CLOUDING OF SENSORIUM: ORIENTED AND CAN DO SERIAL ADDITIONS
NAUSEA AND VOMITING: NO NAUSEA AND NO VOMITING
TREMOR: MODERATE TREMOR WITH ARMS EXTENDED
AGITATION: SOMEWHAT MORE THAN NORMAL ACTIVITY
NAUSEA AND VOMITING: NO NAUSEA AND NO VOMITING
TREMOR: MODERATE TREMOR WITH ARMS EXTENDED
TOTAL SCORE: 27
NAUSEA AND VOMITING: NO NAUSEA AND NO VOMITING
HEADACHE, FULLNESS IN HEAD: NOT PRESENT
VISUAL DISTURBANCES: NOT PRESENT
ORIENTATION AND CLOUDING OF SENSORIUM: DATE DISORIENTATION BY MORE THAN TWO CALENDAR DAYS
VISUAL DISTURBANCES: MODERATELY SEVERE HALLUCINATIONS
ANXIETY: NO ANXIETY (AT EASE)
PAROXYSMAL SWEATS: NO SWEAT VISIBLE
PAROXYSMAL SWEATS: NO SWEAT VISIBLE
ORIENTATION AND CLOUDING OF SENSORIUM: ORIENTED AND CAN DO SERIAL ADDITIONS
VISUAL DISTURBANCES: MODERATE SENSITIVITY
NAUSEA AND VOMITING: NO NAUSEA AND NO VOMITING
TOTAL SCORE: 14
PAROXYSMAL SWEATS: *
TREMOR: MODERATE TREMOR WITH ARMS EXTENDED
VISUAL DISTURBANCES: SEVERE HALLUCINATIONS
TREMOR: MODERATE TREMOR WITH ARMS EXTENDED
ORIENTATION AND CLOUDING OF SENSORIUM: DATE DISORIENTATION BY NO MORE THAN TWO CALENDAR DAYS
TREMOR: MODERATE TREMOR WITH ARMS EXTENDED
VISUAL DISTURBANCES: VERY MILD SENSITIVITY

## 2023-10-05 ASSESSMENT — GAIT ASSESSMENTS
ASSISTIVE DEVICE: FRONT WHEEL WALKER
GAIT LEVEL OF ASSIST: CONTACT GUARD ASSIST
DISTANCE (FEET): 150
DEVIATION: SHUFFLED GAIT

## 2023-10-05 ASSESSMENT — PAIN DESCRIPTION - PAIN TYPE
TYPE: ACUTE PAIN

## 2023-10-05 ASSESSMENT — COGNITIVE AND FUNCTIONAL STATUS - GENERAL
SUGGESTED CMS G CODE MODIFIER MOBILITY: CK
MOVING TO AND FROM BED TO CHAIR: A LITTLE
MOVING FROM LYING ON BACK TO SITTING ON SIDE OF FLAT BED: A LITTLE
CLIMB 3 TO 5 STEPS WITH RAILING: A LITTLE
STANDING UP FROM CHAIR USING ARMS: A LITTLE
TURNING FROM BACK TO SIDE WHILE IN FLAT BAD: A LITTLE
MOBILITY SCORE: 18
WALKING IN HOSPITAL ROOM: A LITTLE

## 2023-10-05 ASSESSMENT — FIBROSIS 4 INDEX: FIB4 SCORE: 5.12

## 2023-10-05 NOTE — ASSESSMENT & PLAN NOTE
Patient had to be straight cath 2x and continued to have around 300 mL for post void residuals. Maza was placed on 10/8 with at least 300 mL removed.    - Maza in place   - Continue home medication of doxazosin 4 mg

## 2023-10-05 NOTE — ED NOTES
Med rec is completed per patient.  Allergies reviewed with patient.  Preferred pharmacy is [facility] in [place].    Patient denies outpatient antibiotics in the last 30 days.

## 2023-10-05 NOTE — ASSESSMENT & PLAN NOTE
Pt with troponin at 29 and 31 on admission. EKG unremarkable. Pt with no chest pain. Mildly elevated troponin could be due to low volume status. Patient denies chest pain.  Plan:     - If symptoms arise can get EKG and troponin

## 2023-10-05 NOTE — PROGRESS NOTES
Physician at bedside to evaluate patient. Pt with increased CIWA scores throughout this shift. Orders placed.

## 2023-10-05 NOTE — PROGRESS NOTES
Report received from MILTON Tabor. Patient arrived to tele [phone] on zoll. Patient is a & O x 4, room air, SR on the monitor. POC discussed. Fall precautions in place. Call light in reach.

## 2023-10-05 NOTE — ASSESSMENT & PLAN NOTE
#Somnolent state   #Respiratory Depression   #Alcohol Withdrawal Syndrome with delirium   Patient was given flumazenil by rapid response team for reversal of ativan due to somnolent state, increased oxygen demand and respiratory depression. Patient was seen by intensivist at bedside. Patient had some improvement with flumazenil, but remained somnolent.     Plan:  - Transfer to ICU for further management of airway and alcohol withdrawal   - ABG results   - CXR with pulmonary edema and likely small moderate bilateral pleural effusion   - [facility] protocol in place, requested that tremor not be included in calculation given essential tremor at baseline  - Patient completed librium taper 10/8/23  - Prior to dc patient is interested in trying naltrexone   - Provide resources for AA at dc   - Continue multivitamin and thiamine supplement

## 2023-10-05 NOTE — ASSESSMENT & PLAN NOTE
Pt with pmhx of hypertension, takes losartan 50 mg.   Plan:   - Continue home medication   - PRN antihypertension for SBP greater than 180 or DBP greater than 110

## 2023-10-05 NOTE — H&P
[facility] MEDICINE H&P    PATIENT ID:  NAME:  Balwinder Grimes  MRN:               [id number]  YOB: 1950    Date of Admission: 10/4/2023     Attending: Talia Pope M.d.    Resident: Raisa Villa M.D.    Primary Care Physician:  Sadnra Moreau P.A.-C.    CC:  syncope     HPI: Balwinder Grimes is a 72 y.o. male who presented to the ED after a syncopal episode with ground level fall. Patient reports he was in his garage, stood up, felt dizzy, and had a ground level fall. He describes dizziness as light headed. He states he took doxazosin 4 mg daily for urinary retention and accidentally took a second dose yesterday. He reports some left elbow pain from fall as well. Dizziness has been happening for the past month. He had a ground level fall earlier this month also due to dizziness. Has old bruise surrounding right eye due to this. Patient is accompanied by his son. His son reports patient been drinking heavily for the past year, patient drinks 1-2 bottles of wine per night. Patient reports he previously would drink only on the weekends with his wife but he has increased to daily.. 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 Son also states patient has not been eating adequately. Pt states he typically only eats dinner, he does try to drink 5-6 glasses of water a day. Patient also reports leg weakness, he has history of neuropathy and feels it has been more difficult with ambulation over the past year. He also has essential tremor which improves with alcohol use. Patient has history of hypertension and hyperlipidemia. He occasionally will take BP at home and it has been systolic 130s. No chest pain, shortness of breath, of palpitations with episodes of dizziness and glf.     ERCourse:  CBC indicated new onset macrocytic anemia with Hgb of 12.7, .5, Plt of 91 which appears to be down trending. On CMP low K at 3.3 and mildly elevated alk phos of 101.  Mag 2.1 within  normal range. Trop elevated 29, however EKG normal sinus rhythm. Lactic acid of 2.5. CT head with no acute findings. CT C spine with no acute findings. CXR also unremarkable. Pt was given a 1 L bolus, started on a multivitamin and thiamine 100 mg table.     REVIEW OF SYSTEMS:   Ten systems reviewed and were negative except as noted in the HPI.                PAST MEDICAL HISTORY:  Past Medical History:   Diagnosis Date    High cholesterol     Hypertension     Numbness and tingling     intermittent lower left leg    Pain 08/2018    low back, bilateral legs       PAST SURGICAL HISTORY:  Past Surgical History:   Procedure Laterality Date    LUMBAR DECOMPRESSION  8/21/2018    Procedure: LUMBAR DECOMPRESSION- POSTERIOR L3-4;  Surgeon: Laxmi Starr M.D.;  Location: SURGERY [facility];  Service: Orthopedics    DUPUYTREN CONTRACTURE RELEASE Right 2010    hand    OTHER ORTHOPEDIC SURGERY  2010    right hand Dupuytren's contracture    OTHER ABDOMINAL SURGERY  2005    Cholecystectomy    OTHER ABDOMINAL SURGERY  1983    APPY       FAMILY HISTORY:  History reviewed. No pertinent family history.    SOCIAL HISTORY:   Pt lives in [place] with his wife  Smoking none   Etoh use daily. 1-2 bottles of wine.   Drug use none     DIET:   Orders Placed This Encounter   Procedures    Diet Order Diet: Regular     Standing Status:   Standing     Number of Occurrences:   1     Order Specific Question:   Diet:     Answer:   Regular [1]       ALLERGIES:  No Known Allergies    OUTPATIENT MEDICATIONS:    Current Facility-Administered Medications:     thiamine (Vitamin B-1) tablet 100 mg, 100 mg, Oral, Once, Elia Hart M.D.    folic acid (Folvite) tablet 1 mg, 1 mg, Oral, DAILY, Elia Hart M.D.    multivitamin tablet 1 Tablet, 1 Tablet, Oral, Once, Elia Hart M.D.    senna-docusate (Pericolace Or Senokot S) 8.6-50 MG per tablet 2 Tablet, 2 Tablet, Oral, BID **AND** polyethylene glycol/lytes (Miralax)  PACKET 1 Packet, 1 Packet, Oral, QDAY PRN **AND** magnesium hydroxide (Milk Of Magnesia) suspension 30 mL, 30 mL, Oral, QDAY PRN **AND** bisacodyl (Dulcolax) suppository 10 mg, 10 mg, Rectal, QDAY PRN, Talia Pope M.D.    NS infusion, , Intravenous, Continuous, Talia Pope M.D.    enoxaparin (Lovenox) inj 40 mg, 40 mg, Subcutaneous, DAILY AT 1800, Talia Pope M.D.    acetaminophen (Tylenol) tablet 650 mg, 650 mg, Oral, Q6HRS PRN, Talia Pope M.D.    labetalol (Normodyne/Trandate) injection 10 mg, 10 mg, Intravenous, Q4HRS PRN, Talia Pope M.D.    ondansetron (Zofran) syringe/vial injection 4 mg, 4 mg, Intravenous, Q4HRS PRN, Talia Pope M.D.    ondansetron (Zofran ODT) dispertab 4 mg, 4 mg, Oral, Q4HRS PRN, Talia Pope M.D.    LORazepam (Ativan) tablet 0.5 mg, 0.5 mg, Oral, Q4HRS PRN, Talia Pope M.D.    LORazepam (Ativan) tablet 1 mg, 1 mg, Oral, Q4HRS PRN **OR** LORazepam (Ativan) injection 0.5 mg, 0.5 mg, Intravenous, Q4HRS PRN, Talia Pope M.D.    LORazepam (Ativan) tablet 2 mg, 2 mg, Oral, Q2HRS PRN **OR** LORazepam (Ativan) injection 1 mg, 1 mg, Intravenous, Q2HRS PRN, Talia Ppoe M.D.    LORazepam (Ativan) tablet 3 mg, 3 mg, Oral, Q HOUR PRN **OR** LORazepam (Ativan) injection 1.5 mg, 1.5 mg, Intravenous, Q HOUR PRN, Talia Pope M.D.    LORazepam (Ativan) tablet 4 mg, 4 mg, Oral, Q15 MIN PRN **OR** LORazepam (Ativan) injection 2 mg, 2 mg, Intravenous, Q15 MIN PRN, Talia Pope M.D.    Current Outpatient Medications:     B Complex Vitamins (B COMPLEX 1 PO), Take 1 Tablet by mouth every day., Disp: , Rfl:     Cholecalciferol (VITAMIN D3 PO), Take 1 Tablet by mouth every day., Disp: , Rfl:     Ascorbic Acid (JAYLENE-C PO), Take 1 Tablet by mouth every day., Disp: , Rfl:     acetaminophen (TYLENOL) 500 MG Tab, Take 1,000 mg by mouth 1 time a day as needed for Mild Pain., Disp: , Rfl:     ibuprofen (MOTRIN) 200 MG Tab, Take 800 mg by mouth 1 time a day as  "needed for Mild Pain. 4 TABLETS=800 MG, Disp: , Rfl:     DULoxetine (CYMBALTA) 30 MG Cap DR Particles, Take 1 Capsule by mouth every day., Disp: 30 Capsule, Rfl: 3    rosuvastatin (CRESTOR) 20 MG Tab, Take 1 Tablet by mouth every evening., Disp: 30 Tablet, Rfl: 11    doxazosin (CARDURA) 4 MG Tab, Take 1 Tablet by mouth every evening., Disp: 90 Tablet, Rfl: 3    losartan (COZAAR) 50 MG Tab, Take 1 Tablet by mouth every day., Disp: 90 Tablet, Rfl: 2    PHYSICAL EXAM:  Vitals:    10/04/23 1527 10/04/23 1600   BP: 117/66 125/69   Pulse: 64 65   Resp: 15 20   Temp: 36.7 °C (98.1 °F)    TempSrc: Temporal    SpO2: 97% 94%   Weight: 97.5 kg (215 lb)    Height: 1.88 m (6' 2\")    , Temp (24hrs), Av.7 °C (98.1 °F), Min:36.7 °C (98.1 °F), Max:36.7 °C (98.1 °F)  , Pulse Oximetry: 94 %, O2 Delivery Device: None - Room Air    General: Pt resting in NAD, cooperative   Skin:  Pink, warm and dry.  No rashes  HEENT: NC/AT. PERRL. EOMI. Ecchymosis surrounding right eye with an abrasion above right eye, MMM. No nasal discharge. Oropharynx nonerythematous without exudate/plaques  Neck:  Supple without lymphadenopathy or rigidity. No JVD   Lungs:  Symmetrical.  CTAB with no W/R/R.  Good air movement   Cardiovascular:  S1/S2 RRR without M/R/G.  Abdomen:  Abdomen is soft NT/ND. +BS. No masses noted.  Genitourinary:  deferred   Extremities: left elbow with abrasion present, Full range of motion. No gross deformities noted. 2+ pulses in all extremities. No C/C/E   Spine:  Straight without vertebral anomalies.  CNS:  Muscle tone is normal. Cranial nerves II-XII grossly intact. 2+ DTRs.         LAB TESTS:   Recent Labs     10/04/23  1555   WBC 6.1   RBC 3.52*   HEMOGLOBIN 12.7*   HEMATOCRIT 36.8*   .5*   MCH 36.1*   RDW 46.2   PLATELETCT 91*   MPV 10.4   NEUTSPOLYS 60.50   LYMPHOCYTES 15.80*   MONOCYTES 20.50*   EOSINOPHILS 2.10   BASOPHILS 0.80         Recent Labs     10/04/23  1555   SODIUM 138   POTASSIUM 3.3*   CHLORIDE 102   CO2 " "21   BUN 12   CREATININE 0.83   CALCIUM 8.7   MAGNESIUM 2.1   ALBUMIN 3.4       CULTURES:   Results       Procedure Component Value Units Date/Time    BLOOD CULTURE [[id number]] Collected: 10/04/23 1533    Order Status: Sent Specimen: Blood from Peripheral Updated: 10/04/23 1732    Narrative:      Per Hospital Policy: Only change Specimen Src: to \"Line\" if  specified by physician order.  Release to patient->Immediate    BLOOD CULTURE [[id number]] Collected: 10/04/23 1532    Order Status: Sent Specimen: Blood from Peripheral Updated: 10/04/23 1732    Narrative:      Per Hospital Policy: Only change Specimen Src: to \"Line\" if  specified by physician order.  Release to patient->Immediate    URINALYSIS [[id number]]     Order Status: Sent Specimen: Urine     URINE CULTURE(NEW) [[id number]]     Order Status: Sent Specimen: Urine             IMAGES:  DX-CHEST-PORTABLE (1 VIEW)   Final Result      No acute cardiopulmonary abnormality.      CT-CSPINE WITHOUT PLUS RECONS   Final Result      No acute fracture or dislocation of the cervical spine.      CT-HEAD W/O   Final Result      1.  Cerebral atrophy.      2.  White matter lucencies most consistent with small vessel ischemic change versus demyelination or gliosis.      3.  Otherwise, Head CT without contrast with no acute findings. No evidence of acute cerebral infarction, hemorrhage or mass lesion.             CONSULTS:   None     ASSESSMENT/PLAN: 72 y.o. male with past medical history of hypertension, hyperlipidemia, and alcohol abuse admitted for syncopal work up.     Syncope and collapse  Pt with lightheadedness and syncope. CT head was unremarkable.  Likely due to orthostatic hypotension and deconditioning. Pt also with chronic alcoholism however did not have a drink in over 5 days of this episode.   Plan:   - Tele monitoring   - PT/OT to evaluate patient   - Pending orthostatic vitals   - Continue  mL/hr overnight as patient appears volume down   - Will also have " nutrition evaluate at patient as intake has been poor     Alcohol abuse  Pt drinks 1-2 bottles of wine per night. Seems to be related to stress care giving of wife. Patient reports his last drink was five days ago.   Plan:   - CIWA protocol in place   - Prior to dc patient is interested in trying naltrexone   - Provide resources for AA at [place]   - Continue multivitamin and thiamine supplement     Primary hypertension  Pt with pmhx of hypertension, takes losartan 50 mg.   Plan:   - Continue home medication   - PRN antihypertension     Neuropathy  Patient reports chronic neuropathy. He denies hx of diabetes however hba1c 3 months ago was 6. Previously on gabapentin with no relief.   Plan:   - Pending hbA1c  - PT/OT to evaluate gait and strength     Essential tremor  Pt reports hx of essential tremor. Alcohol use helps reduce symptoms. Has not tried medications in the past.   Plan:   - Dayteam to consider starting medication like beta blocker for patient however holding off now due to light headedness   - Ordered OT     Hypokalemia  Potassium tonight is 3.3.   Plan:   - Will replete potassium   - Monitor CMP daily     Thrombocytopenia (HCC)  Pt with chronically low platelets. This is likely due to heavy alcohol use.   Plan:   - Continue to monitor CBC     Macrocytic anemia  Pt with hemoglobin of 12.7 and MCV at 104.5 Likely due to heavy alcohol use.   Plan:   - Continue to monitor CBC   - Continue multivitamin and thiamine supplement     Elevated troponin  Pt with troponin at 29. EKG unremarkable. Pt with no chest pain. Mildly elevated troponin could be due to low volume status.   Plan:   - Will continue to trend troponin   - If symptoms arise can get EKG      Benign prostatic hyperplasia without lower urinary tract symptoms  Continue home medication of doxazosin 4 mg     Hyperlipidemia  Continue home medication of rosuvastatin 20 mg daily     Core Measures:  Fluids:   mL/hr         Lines: PIV  Abx: None   Diet:  Regular   PPX:  Lovenox   CODE STATUS: Full code    DISPO:  Inpatient     Raisa Villa M.D. PGY-2  [facility]

## 2023-10-05 NOTE — ASSESSMENT & PLAN NOTE
Patient reports chronic neuropathy. He denies hx of diabetes however hba1c 3 months ago was 6. Previously on gabapentin with no relief.   Plan:   - HgbA1c 5.6  - PT recommending rehab placement today due to worsening stability on exam, will continue to follow patient

## 2023-10-05 NOTE — CARE PLAN
The patient is Stable - Low risk of patient condition declining or worsening    Shift Goals  Clinical Goals: Fall precautions, monitor labs and vitals, promote sleep  Patient Goals: Stop falling    Progress made toward(s) clinical / shift goals:    Problem: Optimal Care for Alcohol Withdrawal  Goal: Optimal Care for the alcohol withdrawal patient  Outcome: Progressing     Problem: Seizure Precautions  Goal: Implementation of seizure precautions  Outcome: Progressing     Problem: Psychosocial  Goal: Patient's level of anxiety will decrease  Outcome: Progressing     Problem: Fall Risk  Goal: Patient will remain free from falls  Outcome: Progressing     Problem: Knowledge Deficit - Standard  Goal: Patient and family/care givers will demonstrate understanding of plan of care, disease process/condition, diagnostic tests and medications  Outcome: Progressing       Patient is not progressing towards the following goals:

## 2023-10-05 NOTE — ASSESSMENT & PLAN NOTE
Pt with chronically low platelets. This is likely due to heavy alcohol use.   Plan:   - Continue to monitor CBC

## 2023-10-05 NOTE — DISCHARGE PLANNING
Care Transition Team Assessment  LMSW spoke with pt to conduct assessment and verify demographics. LMSW explained role to pt. Pt stated that he currently lives with his spouse in a SS house with 2 steps to enter at each door. Pt stated that he was independent prior to admission and did not use any DME but he stated that he could benefit from a walker. In the past when pt received surgery he had HH but does not recall the agency. Pt is covered under Medicare and AARP. Currently pt states he is dealing with a lot of personal subjects with his wife but she is seeking help and he is currently is a detox program. Upon DC pt states he has one car and will transport himself back home.     Information Source  Orientation Level: Oriented X4  Information Given By: Patient  Who is responsible for making decisions for patient? : Patient    Readmission Evaluation  Is this a readmission?: No    Elopement Risk  Legal Hold: No  Ambulatory or Self Mobile in Wheelchair: No-Not an Elopement Risk  Disoriented: No  Psychiatric Symptoms: None  History of Wandering: No  Elopement this Admit: No  Vocalizing Wanting to Leave: No  Displays Behaviors, Body Language Wanting to Leave: No-Not at Risk for Elopement  Elopement Risk: Not at Risk for Elopement    Interdisciplinary Discharge Planning  Primary Care Physician: RENETTA HARRIS P.A.-C.  Lives with - Patient's Self Care Capacity: Spouse  Patient or legal guardian wants to designate a caregiver: No  Support Systems: Spouse / Significant Other, Family Member(s), Friends / Neighbors  Housing / Facility: [address] [facility]  Durable Medical Equipment: Not Applicable    Discharge Preparedness  What is your plan after discharge?: Uncertain - pending medical team collaboration  What are your discharge supports?: Spouse  Prior Functional Level: Ambulatory, Independent with Activities of Daily Living  Difficulity with ADLs: None  Difficulity with IADLs: None    Functional Assesment  Prior Functional  Level: Ambulatory, Independent with Activities of Daily Living    Finances  Financial Barriers to Discharge: No  Prescription Coverage: No    Vision / Hearing Impairment  Right Eye Vision: Impaired (glasses not available)  Left Eye Vision: Impaired (glasses not available)    Domestic Abuse  Have you ever been the victim of abuse or violence?: No  Physical Abuse or Sexual Abuse: No  Verbal Abuse or Emotional Abuse: No  Possible Abuse/Neglect Reported to:: Not Applicable    Psychological Assessment  History of Substance Abuse: Alcohol  History of Psychiatric Problems: No  Non-compliant with Treatment: No  Newly Diagnosed Illness: No    Discharge Risks or Barriers  Discharge risks or barriers?: No    Anticipated Discharge Information  Discharge Disposition: Discharged to home/self care (01)  Discharge Address: [address] [place] [id number]  Discharge Contact Phone Number: [phone]

## 2023-10-05 NOTE — DISCHARGE PLANNING
Case Management Discharge Planning    Admission Date: 10/4/2023  GMLOS: 1.9  ALOS: 1    6-Clicks ADL Score: 18  6-Clicks Mobility Score: 18      Anticipated Discharge Dispo: Discharge Disposition: Discharged to home/self care (01)  [facility] [address]  Per chart review pt resides in [place]    Family support:  Discharge Contact Phone Number: [phone]  Name Relation Home Work Mobile   Terrell Grimes Son   [phone]   Alma Arrington Spouse [phone]       Current insurance on file: Medicare and AARP  DME Needed: No    Action(s) Taken: chart reviewed. Pt discussed during IDT rounds.    Escalations Completed: None    Medically Clear: No    Next Steps:  f/u with pt and medical team to discuss dc needs and barriers.    Barriers to Discharge: Medical clearance    Is the patient up for discharge tomorrow: No    **Spoke with MILTON Phillips and stated that son is in the works of becoming POA for father instead of the pt's spouse due to spouse having dementia. First contact should be son.

## 2023-10-05 NOTE — ASSESSMENT & PLAN NOTE
Pt reports hx of essential tremor. Alcohol use helps reduce symptoms. Has not tried medications in the past.   Plan:   - Ordered OT  - Consider medications, possible beta blocker with improving CIWA

## 2023-10-05 NOTE — ASSESSMENT & PLAN NOTE
Pt with hemoglobin of 12.7 and MCV at 104.5 on admission; Likely due to heavy alcohol use.     Plan:   - Continue to monitor CBC    - Continue multivitamin and thiamine supplement

## 2023-10-05 NOTE — THERAPY
Physical Therapy   Initial Evaluation     Patient Name: Balwinder Grimes  Age:  72 y.o., Sex:  male  Medical Record #: [id number]  Today's Date: 10/5/2023     Precautions  Precautions: Fall Risk    Assessment  Patient is 72 y.o. male admitted with syncope & collapse.  PMH includes alcohol abuse, HTN, neuropathy, essential tremor, HLD, and macrocytic anemia.  Pt presented for acute PT evaluation with impaired balance, activity tolerance, safety awareness, coordination, increased tremors and confusion.  Pt pleasantly confused, endorsed seeing his wife in his room when she was not physically present.  Noted flexed posture and R lateral lean with ambulation, decreased coordination especially with turning.  Anticipate pt is near his functional baseline and will continue to improve ongoing withdrawal (CIWA ranging from 9-27 today per chart).  Will continue to follow to progress safety & functional mobility.      Plan    Physical Therapy Initial Treatment Plan   Treatment Plan : Bed Mobility, Equipment, Gait Training, Neuro Re-Education / Balance, Self Care / Home Evaluation, Stair Training, Therapeutic Activities, Therapeutic Exercise  Treatment Frequency: 3 Times per Week  Duration: Until Therapy Goals Met    DC Equipment Recommendations: Front-Wheel Walker  Discharge Recommendations: Recommend home health for continued physical therapy services.     Objective     10/05/23 1149   Precautions   Precautions Fall Risk   Pain 0 - 10 Group   Therapist Pain Assessment Post Activity Pain Same as Prior to Activity (Not quantified)   Prior Living Situation   Prior Services Home-Independent   Housing / Facility 1 Story House   Steps Into Home 2   Equipment Owned None   Lives with - Patient's Self Care Capacity Spouse   Comments Pt reported his spouse has early dementia however unclear if accurate as pt is hallucinating that his wife is in his room.   Prior Level of Functional Mobility   Bed Mobility Independent   Transfer Status  Independent   Ambulation Independent   Ambulation Distance [facility] ambualtor   Assistive Devices Used None   Stairs Independent   History of Falls   History of Falls Yes   Cognition    Cognition / Consciousness X   Level of Consciousness Alert   Safety Awareness Impaired   New Learning Impaired   Attention Impaired   Comments Confused, hallucinating that his wife is in the room with him.   Active ROM Lower Body    Active ROM Lower Body  WDL   Strength Lower Body   Lower Body Strength  WDL   Sensation Lower Body   Lower Extremity Sensation   X   Comments Endorsed BLE neuropathy distal to B knees.   Coordination Lower Body    Comments Impaired, tremulous   Vision   Vision Comments R black eye   Balance Assessment   Sitting Balance (Static) Fair   Sitting Balance (Dynamic) Fair -   Standing Balance (Static) Fair -   Standing Balance (Dynamic) Poor +   Weight Shift Sitting Fair   Weight Shift Standing Poor   Bed Mobility    Supine to Sit Supervised   Sit to Supine Supervised   Scooting Supervised   Gait Analysis   Gait Level Of Assist Contact Guard Assist   Assistive Device Front Wheel Walker   Distance (Feet) 150   # of Times Distance was Traveled 1   Deviation Shuffled Gait (flexed posture, R lateral trunk lean, pushes FWW too far away.  Difficulty sequencing turns.)   # of Stairs Climbed 3   Level of Assist with Stairs Minimal Assist   Weight Bearing Status No restrictions   Comments Pt slid foot off each step to guide step down, cues to clear steps completely.   Functional Mobility   Sit to Stand Standby Assist   Bed, Chair, Wheelchair Transfer Contact Guard Assist   Transfer Method Stand Step   Mobility bed mobility, ambulation, stairs   Activity Tolerance   Sitting Edge of Bed 5 min   Standing 10 min   Short Term Goals    Short Term Goal # 1 Pt will perform STS/functional transfers with FWW and SPV in 6 visits to progress bed mobility   Short Term Goal # 2 Pt will ambualte at least 200 ft with FWW and SPV in 6  visits to progress functional independence   Short Term Goal # 3 Pt will negotiate 2 stairs with SPV in 6 visits to safely access home

## 2023-10-05 NOTE — ASSESSMENT & PLAN NOTE
Pt with lightheadedness and syncope. CT head was unremarkable.  Likely due to orthostatic hypotension and deconditioning. Pt also with chronic alcoholism.     After extensive hx taking (pt poor hx), likely syncope d/t dehydration, hypovolemia, vs intoxication    Plan:   - PT recommend front wheel walker with home health at discharge, will continue to follow  - OT  - Pending orthostatic vitals   - Continue  mL/hr, discontinue if fluid overload   - Will also have nutrition evaluate at patient as intake has been poor

## 2023-10-05 NOTE — PROGRESS NOTES
4 Eyes Skin Assessment Completed by MILTON Schwarz and MILTON Salvador.    Head Scab, Bruising, and Scratch  Ears WDL  Nose WDL  Mouth WDL  Neck WDL  Breast/Chest WDL  Shoulder Blades WDL  Spine WDL  (R) Arm/Elbow/Hand Bruising  (L) Arm/Elbow/Hand Abrasion and Discoloration, Bleeding, Skin Tear, Partial thickness wound  Abdomen Scab and Bruising  Groin WDL  Scrotum/Coccyx/Buttocks Redness and Blanching  (R) Leg Redness, Blanching, and Scab  (L) Leg Redness, Blanching, and Scab  (R) Heel/Foot/Toe Boggy and Scab  (L) Heel/Foot/Toe Boggy          Devices In Places Tele Box, Blood Pressure Cuff, and Pulse Ox      Interventions In Place Heel Mepilex, Sacral Mepilex, Pillows, and Heels Loaded W/Pillows, Steri-strips, Bandaging, Offloading dressings over elbows    Possible Skin Injury Yes    Pictures Uploaded Into Epic Yes  Wound Consult Placed Yes  RN Wound Prevention Protocol Ordered Yes

## 2023-10-06 PROBLEM — H10.9 CONJUNCTIVITIS OF BOTH EYES: Status: ACTIVE | Noted: 2023-10-06

## 2023-10-06 LAB
ALBUMIN SERPL BCP-MCNC: 3.2 G/DL (ref 3.2–4.9)
ALBUMIN/GLOB SERPL: 1.3 G/DL
ALP SERPL-CCNC: 92 U/L (ref 30–99)
ALT SERPL-CCNC: 22 U/L (ref 2–50)
ANION GAP SERPL CALC-SCNC: 13 MMOL/L (ref 7–16)
AST SERPL-CCNC: 28 U/L (ref 12–45)
BILIRUB SERPL-MCNC: 1 MG/DL (ref 0.1–1.5)
BUN SERPL-MCNC: 9 MG/DL (ref 8–22)
CALCIUM ALBUM COR SERPL-MCNC: 8.8 MG/DL (ref 8.5–10.5)
CALCIUM SERPL-MCNC: 8.2 MG/DL (ref 8.5–10.5)
CHLORIDE SERPL-SCNC: 108 MMOL/L (ref 96–112)
CO2 SERPL-SCNC: 17 MMOL/L (ref 20–33)
CREAT SERPL-MCNC: 0.58 MG/DL (ref 0.5–1.4)
GFR SERPLBLD CREATININE-BSD FMLA CKD-EPI: 103 ML/MIN/1.73 M 2
GLOBULIN SER CALC-MCNC: 2.4 G/DL (ref 1.9–3.5)
GLUCOSE SERPL-MCNC: 99 MG/DL (ref 65–99)
MAGNESIUM SERPL-MCNC: 2 MG/DL (ref 1.5–2.5)
PHOSPHATE SERPL-MCNC: 3.4 MG/DL (ref 2.5–4.5)
POTASSIUM SERPL-SCNC: 3.8 MMOL/L (ref 3.6–5.5)
PROT SERPL-MCNC: 5.6 G/DL (ref 6–8.2)
SODIUM SERPL-SCNC: 138 MMOL/L (ref 135–145)

## 2023-10-06 PROCEDURE — 99232 SBSQ HOSP IP/OBS MODERATE 35: CPT | Mod: GC | Performed by: FAMILY MEDICINE

## 2023-10-06 PROCEDURE — A9270 NON-COVERED ITEM OR SERVICE: HCPCS

## 2023-10-06 PROCEDURE — 97167 OT EVAL HIGH COMPLEX 60 MIN: CPT

## 2023-10-06 PROCEDURE — A9270 NON-COVERED ITEM OR SERVICE: HCPCS | Performed by: EMERGENCY MEDICINE

## 2023-10-06 PROCEDURE — 700105 HCHG RX REV CODE 258: Performed by: STUDENT IN AN ORGANIZED HEALTH CARE EDUCATION/TRAINING PROGRAM

## 2023-10-06 PROCEDURE — 83735 ASSAY OF MAGNESIUM: CPT

## 2023-10-06 PROCEDURE — 700102 HCHG RX REV CODE 250 W/ 637 OVERRIDE(OP): Performed by: STUDENT IN AN ORGANIZED HEALTH CARE EDUCATION/TRAINING PROGRAM

## 2023-10-06 PROCEDURE — 84100 ASSAY OF PHOSPHORUS: CPT

## 2023-10-06 PROCEDURE — 700111 HCHG RX REV CODE 636 W/ 250 OVERRIDE (IP)

## 2023-10-06 PROCEDURE — 700102 HCHG RX REV CODE 250 W/ 637 OVERRIDE(OP): Performed by: BEHAVIOR ANALYST

## 2023-10-06 PROCEDURE — 700102 HCHG RX REV CODE 250 W/ 637 OVERRIDE(OP): Performed by: EMERGENCY MEDICINE

## 2023-10-06 PROCEDURE — 700111 HCHG RX REV CODE 636 W/ 250 OVERRIDE (IP): Performed by: STUDENT IN AN ORGANIZED HEALTH CARE EDUCATION/TRAINING PROGRAM

## 2023-10-06 PROCEDURE — A9270 NON-COVERED ITEM OR SERVICE: HCPCS | Performed by: BEHAVIOR ANALYST

## 2023-10-06 PROCEDURE — 700105 HCHG RX REV CODE 258

## 2023-10-06 PROCEDURE — 700102 HCHG RX REV CODE 250 W/ 637 OVERRIDE(OP)

## 2023-10-06 PROCEDURE — A9270 NON-COVERED ITEM OR SERVICE: HCPCS | Performed by: STUDENT IN AN ORGANIZED HEALTH CARE EDUCATION/TRAINING PROGRAM

## 2023-10-06 PROCEDURE — 700101 HCHG RX REV CODE 250

## 2023-10-06 PROCEDURE — 36415 COLL VENOUS BLD VENIPUNCTURE: CPT

## 2023-10-06 PROCEDURE — 80053 COMPREHEN METABOLIC PANEL: CPT

## 2023-10-06 PROCEDURE — 770020 HCHG ROOM/CARE - TELE (206)

## 2023-10-06 PROCEDURE — 97535 SELF CARE MNGMENT TRAINING: CPT

## 2023-10-06 RX ORDER — GAUZE BANDAGE 2" X 2"
100 BANDAGE TOPICAL DAILY
Status: DISCONTINUED | OUTPATIENT
Start: 2023-10-06 | End: 2023-10-06

## 2023-10-06 RX ORDER — POLYMYXIN B SULFATE AND TRIMETHOPRIM 1; 10000 MG/ML; [USP'U]/ML
1 SOLUTION OPHTHALMIC 4 TIMES DAILY
Status: DISPENSED | OUTPATIENT
Start: 2023-10-06 | End: 2023-10-13

## 2023-10-06 RX ADMIN — POLYMYXIN B SULFATE AND TRIMETHOPRIM 1 DROP: 10000; 1 SOLUTION OPHTHALMIC at 15:31

## 2023-10-06 RX ADMIN — LORAZEPAM 1 MG: 1 TABLET ORAL at 22:52

## 2023-10-06 RX ADMIN — LORAZEPAM 2 MG: 2 TABLET ORAL at 20:36

## 2023-10-06 RX ADMIN — CHLORDIAZEPOXIDE HYDROCHLORIDE 50 MG: 25 CAPSULE ORAL at 00:25

## 2023-10-06 RX ADMIN — LORAZEPAM 1.5 MG: 2 INJECTION INTRAMUSCULAR; INTRAVENOUS at 15:23

## 2023-10-06 RX ADMIN — LORAZEPAM 2 MG: 2 TABLET ORAL at 09:51

## 2023-10-06 RX ADMIN — LORAZEPAM 1 MG: 2 INJECTION INTRAMUSCULAR; INTRAVENOUS at 16:42

## 2023-10-06 RX ADMIN — ENOXAPARIN SODIUM 40 MG: 100 INJECTION SUBCUTANEOUS at 20:36

## 2023-10-06 RX ADMIN — LORAZEPAM 1 MG: 2 INJECTION INTRAMUSCULAR; INTRAVENOUS at 08:10

## 2023-10-06 RX ADMIN — FOLIC ACID 1 MG: 1 TABLET ORAL at 06:12

## 2023-10-06 RX ADMIN — THIAMINE HYDROCHLORIDE 200 MG: 100 INJECTION, SOLUTION INTRAMUSCULAR; INTRAVENOUS at 20:43

## 2023-10-06 RX ADMIN — SODIUM CHLORIDE: 9 INJECTION, SOLUTION INTRAVENOUS at 20:18

## 2023-10-06 RX ADMIN — LOSARTAN POTASSIUM 50 MG: 50 TABLET, FILM COATED ORAL at 06:11

## 2023-10-06 RX ADMIN — LORAZEPAM 1 MG: 1 TABLET ORAL at 02:59

## 2023-10-06 RX ADMIN — CHLORDIAZEPOXIDE HYDROCHLORIDE 25 MG: 25 CAPSULE ORAL at 12:18

## 2023-10-06 RX ADMIN — LORAZEPAM 1 MG: 2 INJECTION INTRAMUSCULAR; INTRAVENOUS at 12:17

## 2023-10-06 RX ADMIN — SODIUM CHLORIDE: 9 INJECTION, SOLUTION INTRAVENOUS at 12:26

## 2023-10-06 RX ADMIN — DULOXETINE HYDROCHLORIDE 30 MG: 30 CAPSULE, DELAYED RELEASE ORAL at 06:12

## 2023-10-06 RX ADMIN — POLYMYXIN B SULFATE AND TRIMETHOPRIM 1 DROP: 10000; 1 SOLUTION OPHTHALMIC at 20:47

## 2023-10-06 RX ADMIN — CHLORDIAZEPOXIDE HYDROCHLORIDE 50 MG: 25 CAPSULE ORAL at 06:11

## 2023-10-06 RX ADMIN — Medication 100 MG: at 09:52

## 2023-10-06 RX ADMIN — LORAZEPAM 2 MG: 2 INJECTION INTRAMUSCULAR; INTRAVENOUS at 16:05

## 2023-10-06 RX ADMIN — LORAZEPAM 2 MG: 2 INJECTION INTRAMUSCULAR; INTRAVENOUS at 16:19

## 2023-10-06 RX ADMIN — SODIUM CHLORIDE: 9 INJECTION, SOLUTION INTRAVENOUS at 04:20

## 2023-10-06 ASSESSMENT — LIFESTYLE VARIABLES
AGITATION: NORMAL ACTIVITY
PAROXYSMAL SWEATS: NO SWEAT VISIBLE
PAROXYSMAL SWEATS: BARELY PERCEPTIBLE SWEATING. PALMS MOIST
TOTAL SCORE: 11
AUDITORY DISTURBANCES: MODERATELY SEVERE HALLUCINATIONS
NAUSEA AND VOMITING: NO NAUSEA AND NO VOMITING
TREMOR: MODERATE TREMOR WITH ARMS EXTENDED
AGITATION: *
ANXIETY: *
ORIENTATION AND CLOUDING OF SENSORIUM: DATE DISORIENTATION BY MORE THAN TWO CALENDAR DAYS
VISUAL DISTURBANCES: NOT PRESENT
PAROXYSMAL SWEATS: NO SWEAT VISIBLE
TREMOR: *
AUDITORY DISTURBANCES: NOT PRESENT
ANXIETY: *
NAUSEA AND VOMITING: NO NAUSEA AND NO VOMITING
TOTAL SCORE: 13
AUDITORY DISTURBANCES: NOT PRESENT
ORIENTATION AND CLOUDING OF SENSORIUM: DATE DISORIENTATION BY MORE THAN TWO CALENDAR DAYS
HEADACHE, FULLNESS IN HEAD: NOT PRESENT
AGITATION: SOMEWHAT MORE THAN NORMAL ACTIVITY
ANXIETY: MODERATELY ANXIOUS OR GUARDED, SO ANXIETY IS INFERRED
ANXIETY: *
NAUSEA AND VOMITING: NO NAUSEA AND NO VOMITING
PAROXYSMAL SWEATS: NO SWEAT VISIBLE
VISUAL DISTURBANCES: MILD SENSITIVITY
ANXIETY: *
TOTAL SCORE: 26
HEADACHE, FULLNESS IN HEAD: NOT PRESENT
ANXIETY: *
HEADACHE, FULLNESS IN HEAD: NOT PRESENT
TREMOR: *
VISUAL DISTURBANCES: VERY MILD SENSITIVITY
HEADACHE, FULLNESS IN HEAD: NOT PRESENT
TREMOR: *
VISUAL DISTURBANCES: MILD SENSITIVITY
NAUSEA AND VOMITING: NO NAUSEA AND NO VOMITING
NAUSEA AND VOMITING: NO NAUSEA AND NO VOMITING
ANXIETY: MILDLY ANXIOUS
HEADACHE, FULLNESS IN HEAD: NOT PRESENT
AUDITORY DISTURBANCES: VERY MILD HARSHNESS OR ABILITY TO FRIGHTEN
HEADACHE, FULLNESS IN HEAD: NOT PRESENT
NAUSEA AND VOMITING: NO NAUSEA AND NO VOMITING
TOTAL SCORE: 12
AGITATION: NORMAL ACTIVITY
VISUAL DISTURBANCES: MODERATELY SEVERE HALLUCINATIONS
HEADACHE, FULLNESS IN HEAD: NOT PRESENT
ORIENTATION AND CLOUDING OF SENSORIUM: DATE DISORIENTATION BY MORE THAN TWO CALENDAR DAYS
AUDITORY DISTURBANCES: NOT PRESENT
PAROXYSMAL SWEATS: *
TOTAL SCORE: 8
TOTAL SCORE: 11
NAUSEA AND VOMITING: NO NAUSEA AND NO VOMITING
AGITATION: MODERATELY FIDGETY AND RESTLESS
AGITATION: SOMEWHAT MORE THAN NORMAL ACTIVITY
TREMOR: MODERATE TREMOR WITH ARMS EXTENDED
AGITATION: NORMAL ACTIVITY
PAROXYSMAL SWEATS: *
AGITATION: SOMEWHAT MORE THAN NORMAL ACTIVITY
VISUAL DISTURBANCES: VERY MILD SENSITIVITY
VISUAL DISTURBANCES: VERY MILD SENSITIVITY
ORIENTATION AND CLOUDING OF SENSORIUM: DISORIENTED FOR PLACE AND / OR PERSON
TREMOR: MODERATE TREMOR WITH ARMS EXTENDED
VISUAL DISTURBANCES: MILD SENSITIVITY
ORIENTATION AND CLOUDING OF SENSORIUM: DISORIENTED FOR PLACE AND / OR PERSON
TREMOR: MODERATE TREMOR WITH ARMS EXTENDED
NAUSEA AND VOMITING: NO NAUSEA AND NO VOMITING
ORIENTATION AND CLOUDING OF SENSORIUM: CANNOT DO SERIAL ADDITIONS OR IS UNCERTAIN ABOUT DATE
AUDITORY DISTURBANCES: NOT PRESENT
PAROXYSMAL SWEATS: *
HEADACHE, FULLNESS IN HEAD: NOT PRESENT
HEADACHE, FULLNESS IN HEAD: NOT PRESENT
AUDITORY DISTURBANCES: VERY MILD HARSHNESS OR ABILITY TO FRIGHTEN
AUDITORY DISTURBANCES: VERY MILD HARSHNESS OR ABILITY TO FRIGHTEN
NAUSEA AND VOMITING: NO NAUSEA AND NO VOMITING
AUDITORY DISTURBANCES: MODERATELY SEVERE HALLUCINATIONS
AGITATION: MODERATELY FIDGETY AND RESTLESS
TOTAL SCORE: 27
ANXIETY: *
HEADACHE, FULLNESS IN HEAD: NOT PRESENT
PAROXYSMAL SWEATS: NO SWEAT VISIBLE
PAROXYSMAL SWEATS: *
NAUSEA AND VOMITING: NO NAUSEA AND NO VOMITING
VISUAL DISTURBANCES: MILD SENSITIVITY
ORIENTATION AND CLOUDING OF SENSORIUM: DISORIENTED FOR PLACE AND / OR PERSON
TREMOR: *
TOTAL SCORE: 9
TREMOR: MODERATE TREMOR WITH ARMS EXTENDED
ORIENTATION AND CLOUDING OF SENSORIUM: DISORIENTED FOR PLACE AND / OR PERSON
TOTAL SCORE: 17
ORIENTATION AND CLOUDING OF SENSORIUM: DATE DISORIENTATION BY MORE THAN TWO CALENDAR DAYS
ORIENTATION AND CLOUDING OF SENSORIUM: DISORIENTED FOR PLACE AND / OR PERSON
ANXIETY: NO ANXIETY (AT EASE)
TOTAL SCORE: 14
TREMOR: TREMOR NOT VISIBLE BUT CAN BE FELT, FINGERTIP TO FINGERTIP
PAROXYSMAL SWEATS: NO SWEAT VISIBLE
ANXIETY: *
AGITATION: SOMEWHAT MORE THAN NORMAL ACTIVITY
VISUAL DISTURBANCES: MODERATELY SEVERE HALLUCINATIONS
AUDITORY DISTURBANCES: VERY MILD HARSHNESS OR ABILITY TO FRIGHTEN

## 2023-10-06 ASSESSMENT — COGNITIVE AND FUNCTIONAL STATUS - GENERAL
SUGGESTED CMS G CODE MODIFIER DAILY ACTIVITY: CK
DAILY ACTIVITIY SCORE: 14
HELP NEEDED FOR BATHING: A LOT
TOILETING: A LOT
EATING MEALS: A LITTLE
PERSONAL GROOMING: A LITTLE
DRESSING REGULAR UPPER BODY CLOTHING: A LOT
DRESSING REGULAR LOWER BODY CLOTHING: A LOT

## 2023-10-06 ASSESSMENT — FIBROSIS 4 INDEX: FIB4 SCORE: 5.12

## 2023-10-06 ASSESSMENT — PAIN DESCRIPTION - PAIN TYPE
TYPE: ACUTE PAIN
TYPE: ACUTE PAIN

## 2023-10-06 ASSESSMENT — ACTIVITIES OF DAILY LIVING (ADL): TOILETING: INDEPENDENT

## 2023-10-06 NOTE — DOCUMENTATION QUERY
[facility]                                                                       Query Response Note      PATIENT:               PER FLOYD  ACCT #:                  [id number]  MRN:                     [id number]  :                      1950  ADMIT DATE:       10/4/2023 3:13 PM  DISCH DATE:          RESPONDING  PROVIDER #:        [id number]           QUERY TEXT:    Altered mental status is documented in the Medical Record.  Can a more specific diagnosis be provided?    The patient's Clinical Indicators include:  10/5 Progress Note: AMS, hallucinations   Risk Factors: alcohol withdrawal, likely dehydration  Treatment: CIWA protocol , IVF, multivitamin, thiamine     Thank you,  Navin Morris RN, BSN, CCDS  Clinical   Connect via [employer]  Options provided:   -- Delirium due to alcohol withdrawal   -- Acute metabolic encephalopathy   -- Acute toxic encephalopathy   -- Acute alcoholic encephalopathy   -- Other encephalopathy, please specify   -- Acute alcoholic psychosis   -- Delirium due to general medical condition   -- Delirium due to multiple etiologies   -- Delirium due to other known physiological condition, (please specify the known physiological condition)   -- Delirium of unknown etiology   -- Other explanation, please specify   -- Unable to determine      Query created by: Navin Morris on 10/6/2023 10:20 AM    RESPONSE TEXT:    Delirium due to alcohol withdrawal          Electronically signed by:  PETE PRINCE MD 10/6/2023 2:55 PM

## 2023-10-06 NOTE — ASSESSMENT & PLAN NOTE
Bilateral conjunctivitis of the eye, with dried discharge on the eyelids.     - Trimethoprim-polymyxin four times daily for 7 days

## 2023-10-06 NOTE — CARE PLAN
Progress made toward(s) clinical / shift goals:      Problem: Optimal Care for Alcohol Withdrawal  Goal: Optimal Care for the alcohol withdrawal patient  Outcome: Progressing  Note: CIWA protocol in place      Problem: Seizure Precautions  Goal: Implementation of seizure precautions  Outcome: Progressing  Note: Seizure precautions in place      Problem: Fall Risk  Goal: Patient will remain free from falls  Outcome: Progressing  Note: Treaded socks and bed/strip alarm on, side rails up x 3. Call light within reach. Pt educated to call for assistance. Reinforce as needed. Continue to monitor.       The patient is Stable - Low risk of patient condition declining or worsening    Shift Goals  Clinical Goals: CIWA, monitor VS/labs  Patient Goals: Sleep      Patient is not progressing towards the following goals:

## 2023-10-06 NOTE — PROGRESS NOTES
[facility] [place] MEDICINE [place]GRESS NOTE     Attending:   Salo Graff MD    Resident:   Senior: Frankie Leroy MD  Carlitos: Susan Bradshaw MD    PATIENT:   Balwinder Grimes; [id number]; 1950    ID:   72 y.o. male admitted for syncope and CIWA    SUBJECTIVE:   No acute events overnight. Patient this morning is AxOx2 to self and time. He believes that he is at his house. Poor historian however denies chest pain.    OBJECTIVE:  Vitals:    10/06/23 0349 10/06/23 0802 10/06/23 0952 10/06/23 1148   BP: 136/86 121/68  95/59   Pulse: 81 82  83   Resp: 18 18  18   Temp:  37.8 °C (100 °F) 37.3 °C (99.1 °F) 36.4 °C (97.5 °F)   TempSrc: Temporal Temporal Temporal Temporal   SpO2: 96% 93%  95%   Weight: 98.4 kg (216 lb 14.9 oz)      Height:           Intake/Output Summary (Last 24 hours) at 10/5/2023 1745  Last data filed at 10/5/2023 1700  Gross per 24 hour   Intake 2815.24 ml   Output 250 ml   Net 2565.24 ml       PHYSICAL EXAM:  General: No acute distress, afebrile, resting comfortably, conversational   HEENT: NC/AT. EOMI. Conjunctivitis   Cardiovascular: RRR without murmurs, rubs, heaves. Normal capillary refill   Respiratory: CTAB, no tachypnea or retractions   Abdomen: normal bowel sounds, soft, nontender, nondistended, no masses, no organomegaly   EXT:  DRIVER, no edema  Skin: No erythema/lesions   Neuro: Non-focal, alert and orientated x2, essential tremor in bilateral hands present with movement    LABS:  Recent Labs     10/04/23  1555 10/05/23  0057   WBC 6.1 5.4   RBC 3.52* 3.38*   HEMOGLOBIN 12.7* 12.3*   HEMATOCRIT 36.8* 35.5*   .5* 105.0*   MCH 36.1* 36.4*   RDW 46.2 45.1   PLATELETCT 91* 91*   MPV 10.4 10.3   NEUTSPOLYS 60.50  --    LYMPHOCYTES 15.80*  --    MONOCYTES 20.50*  --    EOSINOPHILS 2.10  --    BASOPHILS 0.80  --      Recent Labs     10/04/23  1555 10/05/23  0057 10/06/23  0032   SODIUM 138 138 138   POTASSIUM 3.3* 3.4* 3.8   CHLORIDE 102 103 108   CO2 21 23 17*   BUN 12 12 9   CREATININE 0.83 0.76 0.58  "  CALCIUM 8.7 8.3* 8.2*   MAGNESIUM 2.1  --  2.0   PHOSPHORUS  --   --  3.4   ALBUMIN 3.4 3.6 3.2     Estimated GFR/CRCL = Estimated Creatinine Clearance: 133.9 mL/min (by C-G formula based on SCr of 0.58 mg/dL).  Recent Labs     10/04/23  1555 10/05/23  0057 10/06/23  0032   GLUCOSE 116* 105* 99     Recent Labs     10/04/23  1555 10/05/23  0057 10/06/23  0032   ASTSGOT 40 33 28   ALTSGPT 29 26 22   TBILIRUBIN 1.3 1.3 1.0   ALKPHOSPHAT 101* 99 92   GLOBULIN 2.7 2.3 2.4             No results for input(s): \"INR\", \"APTT\", \"FIBRINOGEN\" in the last 72 hours.    Invalid input(s): \"DIMER\"      IMAGING:  DX-CHEST-PORTABLE (1 VIEW)   Final Result      No acute cardiopulmonary abnormality.      CT-CSPINE WITHOUT PLUS RECONS   Final Result      No acute fracture or dislocation of the cervical spine.      CT-HEAD W/O   Final Result      1.  Cerebral atrophy.      2.  White matter lucencies most consistent with small vessel ischemic change versus demyelination or gliosis.      3.  Otherwise, Head CT without contrast with no acute findings. No evidence of acute cerebral infarction, hemorrhage or mass lesion.             MEDS:  Current Facility-Administered Medications   Medication Last Admin    thiamine (Vitamin B-1) tablet 100 mg 100 mg at 10/06/23 0952    polymixin-trimethoprim (Polytrim) 85185-6.1 UNIT/ML-% ophthalmic solution 1 Drop      chlordiazePOXIDE (Librium) capsule 25 mg 25 mg at 10/06/23 1218    labetalol (Normodyne/Trandate) injection 10 mg      folic acid (Folvite) tablet 1 mg 1 mg at 10/06/23 0612    senna-docusate (Pericolace Or Senokot S) 8.6-50 MG per tablet 2 Tablet 2 Tablet at 10/05/23 0603    And    polyethylene glycol/lytes (Miralax) PACKET 1 Packet      And    magnesium hydroxide (Milk Of Magnesia) suspension 30 mL      And    bisacodyl (Dulcolax) suppository 10 mg      NS infusion New Bag at 10/06/23 1226    enoxaparin (Lovenox) inj 40 mg 40 mg at 10/05/23 1721    acetaminophen (Tylenol) tablet 650 mg   " "   ondansetron (Zofran) syringe/vial injection 4 mg      ondansetron (Zofran ODT) dispertab 4 mg      LORazepam (Ativan) tablet 0.5 mg      LORazepam (Ativan) tablet 1 mg 1 mg at 10/06/23 0952    Or    LORazepam (Ativan) injection 0.5 mg      LORazepam (Ativan) tablet 2 mg 2 mg at 10/06/23 0951    Or    LORazepam (Ativan) injection 1 mg 1 mg at 10/06/23 1217    LORazepam (Ativan) tablet 3 mg      Or    LORazepam (Ativan) injection 1.5 mg      LORazepam (Ativan) tablet 4 mg      Or    LORazepam (Ativan) injection 2 mg 2 mg at 10/05/23 1330    doxazosin (Cardura) tablet 4 mg 4 mg at 10/05/23 1721    DULoxetine (Cymbalta) capsule 30 mg 30 mg at 10/06/23 0612    losartan (Cozaar) tablet 50 mg 50 mg at 10/06/23 0611    rosuvastatin (Crestor) tablet 20 mg 20 mg at 10/05/23 1721       ASSESSMENT/PLAN:    * Alcohol abuse  Assessment & Plan  #withdrawls  #CIWA  #alcohol withdrawal hallucinations    Pt drinks 1-2 bottles of wine per night. Seems to be related to stress care giving of wife. Patient reports   his last drink was five days ago to admitting MD, this AM he reporte last drink 8d ago, but then stated he was drinking with his buddies 2d ago; pt states he is in a \"detox\" program.    CIWA worsening throughout the day    Plan:   - CIWA protocol in place, if worsening reach out to Intensivist for evaluation  - Librium 50 mg Q6H will deescalate to Librium 25mg on 10/7/23  - Prior to dc patient is interested in trying naltrexone   - Provide resources for AA at dc   - Continue multivitamin and thiamine supplement     Conjunctivitis of both eyes  Assessment & Plan  Bilateral conjunctivitis of the eye, with dried discharge on the eyelids.     - Trimethoprim-polymyxin four times daily for 7 days    Elevated troponin  Assessment & Plan  Pt with troponin at 29 and 31 on admission. EKG unremarkable. Pt with no chest pain. Mildly elevated troponin could be due to low volume status.   Plan:     - If symptoms arise can get EKG and " trops    Macrocytic anemia  Assessment & Plan  Pt with hemoglobin of 12.7 and MCV at 104.5 on admission; Likely due to heavy alcohol use.     Plan:   - Continue to monitor CBC   - Continue multivitamin and thiamine supplement     Thrombocytopenia (HCC)  Assessment & Plan  Pt with chronically low platelets. This is likely due to heavy alcohol use.   Plan:   - Continue to monitor CBC     Essential tremor  Assessment & Plan  Pt reports hx of essential tremor. Alcohol use helps reduce symptoms. Has not tried medications in the past.   Plan:   - Dayteam to consider starting medication like beta blocker for patient however holding off now due to light headedness and today CIWA worsening  - Ordered OT     Hypokalemia  Assessment & Plan  Low on admission, improving    Plan:   - Will replete potassium prn  - Monitor CMP daily     Syncope and collapse- (present on admission)  Assessment & Plan  Pt with lightheadedness and syncope. CT head was unremarkable.  Likely due to orthostatic hypotension and deconditioning. Pt also with chronic alcoholism.     After extensive hx taking (pt poor hx), likely syncope d/t dehydration, hypovolemia, vs intoxication    Plan:   - Tele monitoring   - PT recommend front wheel walker with home health at discharge, will continue to follow  - OT  - Pending orthostatic vitals   - Continue  mL/hr overnight as patient appears volume down   - Will also have nutrition evaluate at patient as intake has been poor     Neuropathy- (present on admission)  Assessment & Plan  Patient reports chronic neuropathy. He denies hx of diabetes however hba1c 3 months ago was 6. Previously on gabapentin with no relief.   Plan:   - HgbA1c wnl  - PT recommending front wheel walker and home health PT and will continue to follow    Hyperlipidemia- (present on admission)  Assessment & Plan  Continue home medication of rosuvastatin 20 mg daily     Benign prostatic hyperplasia without lower urinary tract symptoms-  (present on admission)  Assessment & Plan  Continue home medication of doxazosin 4 mg     Primary hypertension- (present on admission)  Assessment & Plan  Pt with pmhx of hypertension, takes losartan 50 mg.   Plan:   - Continue home medication   - PRN antihypertension for SBP greater than 180 or DBP greater than 110         Code Status: Full    Dispo: inpt status for further medical management

## 2023-10-06 NOTE — DISCHARGE PLANNING
Community Health Worker Intake    Contact information provided to Balwinder Grimes   Inpatient assessment completed.    CHW Tyler attempted to introduce community care management. Pt going through alcohol withdrawal and not able to hold a conversation about [facility].   Pt shaking, talking about people who aren't in the room, and dozing off.     Plan:CHW will attempt assessment again.

## 2023-10-06 NOTE — DISCHARGE PLANNING
Received choice form at: [phone]  Agency/Facility name: [facility] area SNFs near [place]  Referral sent per choice form at:  [phone]

## 2023-10-06 NOTE — PROGRESS NOTES
[facility] [place] FAM[facility] MEDICINE PROGRESS NOTE     Attending:   Salo Graff MD    Resident:   Frankie Leroy MD    PATIENT:   Balwinder Grimes; [id number]; 1950    ID:   72 y.o. male admitted for syncope and CIWA    SUBJECTIVE:   No acute events overnight, pt getting more hallucinations, poor historian    OBJECTIVE:  Vitals:    10/05/23 0733 10/05/23 1201 10/05/23 1400 10/05/23 1700   BP: (!) 140/77 137/77  108/74   Pulse: 88 84  89   Resp:  18  16   Temp: 37.2 °C (99 °F) 37 °C (98.6 °F)  36.9 °C (98.5 °F)   TempSrc: Temporal Temporal  Temporal   SpO2: 94% 92% 93% 92%   Weight: 92.5 kg (203 lb 14.8 oz)      Height:           Intake/Output Summary (Last 24 hours) at 10/5/2023 1745  Last data filed at 10/5/2023 1700  Gross per 24 hour   Intake 2815.24 ml   Output 250 ml   Net 2565.24 ml       PHYSICAL EXAM:  General: No acute distress, afebrile, resting comfortably, conversational   HEENT: NC/AT. EOMI.   Cardiovascular: RRR without murmurs, rubs, heaves. Normal capillary refill   Respiratory: CTAB, no tachypnea or retractions   Abdomen: normal bowel sounds, soft, nontender, nondistended, no masses, no organomegaly   EXT:  DRIVER, no edema  Skin: No erythema/lesions   Neuro: Non-focal, alert and orientated     LABS:  Recent Labs     10/04/23  1555 10/05/23  0057   WBC 6.1 5.4   RBC 3.52* 3.38*   HEMOGLOBIN 12.7* 12.3*   HEMATOCRIT 36.8* 35.5*   .5* 105.0*   MCH 36.1* 36.4*   RDW 46.2 45.1   PLATELETCT 91* 91*   MPV 10.4 10.3   NEUTSPOLYS 60.50  --    LYMPHOCYTES 15.80*  --    MONOCYTES 20.50*  --    EOSINOPHILS 2.10  --    BASOPHILS 0.80  --      Recent Labs     10/04/23  1555 10/05/23  0057   SODIUM 138 138   POTASSIUM 3.3* 3.4*   CHLORIDE 102 103   CO2 21 23   BUN 12 12   CREATININE 0.83 0.76   CALCIUM 8.7 8.3*   MAGNESIUM 2.1  --    ALBUMIN 3.4 3.6     Estimated GFR/CRCL = Estimated Creatinine Clearance: 102.1 mL/min (by C-G formula based on SCr of 0.76 mg/dL).  Recent Labs     10/04/23  1555 10/05/23  0057   GLUCOSE  "116* 105*     Recent Labs     10/04/23  1555 10/05/23  0057   ASTSGOT 40 33   ALTSGPT 29 26   TBILIRUBIN 1.3 1.3   ALKPHOSPHAT 101* 99   GLOBULIN 2.7 2.3             No results for input(s): \"INR\", \"APTT\", \"FIBRINOGEN\" in the last 72 hours.    Invalid input(s): \"DIMER\"      IMAGING:  DX-CHEST-PORTABLE (1 VIEW)   Final Result      No acute cardiopulmonary abnormality.      CT-CSPINE WITHOUT PLUS RECONS   Final Result      No acute fracture or dislocation of the cervical spine.      CT-HEAD W/O   Final Result      1.  Cerebral atrophy.      2.  White matter lucencies most consistent with small vessel ischemic change versus demyelination or gliosis.      3.  Otherwise, Head CT without contrast with no acute findings. No evidence of acute cerebral infarction, hemorrhage or mass lesion.             MEDS:  Current Facility-Administered Medications   Medication Last Admin    chlordiazePOXIDE (Librium) capsule 50 mg 50 mg at 10/05/23 1721    Followed by    [START ON 10/6/2023] chlordiazePOXIDE (Librium) capsule 25 mg      labetalol (Normodyne/Trandate) injection 10 mg      folic acid (Folvite) tablet 1 mg 1 mg at 10/05/23 0603    senna-docusate (Pericolace Or Senokot S) 8.6-50 MG per tablet 2 Tablet 2 Tablet at 10/05/23 0603    And    polyethylene glycol/lytes (Miralax) PACKET 1 Packet      And    magnesium hydroxide (Milk Of Magnesia) suspension 30 mL      And    bisacodyl (Dulcolax) suppository 10 mg      NS infusion New Bag at 10/05/23 0351    enoxaparin (Lovenox) inj 40 mg 40 mg at 10/05/23 1721    acetaminophen (Tylenol) tablet 650 mg      ondansetron (Zofran) syringe/vial injection 4 mg      ondansetron (Zofran ODT) dispertab 4 mg      LORazepam (Ativan) tablet 0.5 mg      LORazepam (Ativan) tablet 1 mg 1 mg at 10/05/23 0847    Or    LORazepam (Ativan) injection 0.5 mg      LORazepam (Ativan) tablet 2 mg 2 mg at 10/05/23 1721    Or    LORazepam (Ativan) injection 1 mg      LORazepam (Ativan) tablet 3 mg      Or    " "LORazepam (Ativan) injection 1.5 mg      LORazepam (Ativan) tablet 4 mg      Or    LORazepam (Ativan) injection 2 mg 2 mg at 10/05/23 1330    doxazosin (Cardura) tablet 4 mg 4 mg at 10/05/23 1721    DULoxetine (Cymbalta) capsule 30 mg 30 mg at 10/05/23 0602    losartan (Cozaar) tablet 50 mg 50 mg at 10/05/23 0602    rosuvastatin (Crestor) tablet 20 mg 20 mg at 10/05/23 1721       ASSESSMENT/PLAN:    * Alcohol abuse  Assessment & Plan  #withdrawls  #CIWA  #alcohol withdrawal hallucinations    Pt drinks 1-2 bottles of wine per night. Seems to be related to stress care giving of wife. Patient reports   his last drink was five days ago to admitting MD, this AM he reporte last drink 8d ago, but then stated he was drinking with his buddies 2d ago; pt states he is in a \"detox\" program.    CIWA worsening throughout the day    Plan:   - CIWA protocol in place, if worsening reach out to Intensivist for evaluation  - Prior to dc patient is interested in trying naltrexone   - Provide resources for AA at dc   - Continue multivitamin and thiamine supplement     Syncope and collapse- (present on admission)  Assessment & Plan  Pt with lightheadedness and syncope. CT head was unremarkable.  Likely due to orthostatic hypotension and deconditioning. Pt also with chronic alcoholism however did not have a drink in over 5 days of this episode.     After extensive hx taking (pt poor hx), likely syncope d/t dehydration, hypovolemia, vs intoxication    Plan:   - Tele monitoring   - PT/OT to evaluate patient   - Pending orthostatic vitals   - Continue  mL/hr overnight as patient appears volume down   - Will also have nutrition evaluate at patient as intake has been poor     Elevated troponin  Assessment & Plan  Pt with troponin at 29 and 31 on admission. EKG unremarkable. Pt with no chest pain. Mildly elevated troponin could be due to low volume status.   Plan:     - If symptoms arise can get EKG and trops    Macrocytic " anemia  Assessment & Plan  Pt with hemoglobin of 12.7 and MCV at 104.5 on admission; Likely due to heavy alcohol use.     Plan:   - Continue to monitor CBC   - Continue multivitamin and thiamine supplement     Thrombocytopenia (HCC)  Assessment & Plan  Pt with chronically low platelets. This is likely due to heavy alcohol use.   Plan:   - Continue to monitor CBC     Essential tremor  Assessment & Plan  Pt reports hx of essential tremor. Alcohol use helps reduce symptoms. Has not tried medications in the past.   Plan:   - Dayteam to consider starting medication like beta blocker for patient however holding off now due to light headedness and today CIWA worsening  - Ordered OT     Hypokalemia  Assessment & Plan  Low on admission, improving    Plan:   - Will replete potassium prn  - Monitor CMP daily     Neuropathy- (present on admission)  Assessment & Plan  Patient reports chronic neuropathy. He denies hx of diabetes however hba1c 3 months ago was 6. Previously on gabapentin with no relief.   Plan:   - HgbA1c wnl  - PT/OT to evaluate gait and strength     Hyperlipidemia- (present on admission)  Assessment & Plan  Continue home medication of rosuvastatin 20 mg daily     Benign prostatic hyperplasia without lower urinary tract symptoms- (present on admission)  Assessment & Plan  Continue home medication of doxazosin 4 mg     Primary hypertension- (present on admission)  Assessment & Plan  Pt with pmhx of hypertension, takes losartan 50 mg.   Plan:   - Continue home medication   - PRN antihypertension          Code Status: Full    Dispo: inpt status for further medical management

## 2023-10-06 NOTE — DIETARY
"Nutrition services: Day 2 of admit.  Balwinder Grimes is a 72 y.o. male with admitting DX of Alcohol Abuse   Consult received for \"Poor oral intake\"    RD met with pt at bedside, pt presents with signs of active etoh withdrawal but was able to engage in interview. Pt reports no specific dietary restrictions or intolerances. Pt states food intake pta is usually small snacks throughout the day (fruit, cheese with crackers) and a big dinner (steak/fish, vegetables).  Pt encouraged to include more frequent meals/snacks once discharged and to consider daily MVI at home due to h/o etoh abuse. Pt appears well nourished during assessment and had no specific nutrition related questions.     Assessment:  Height: 188 cm (6' 2\")  Weight: 98.4 kg (216 lb 14.9 oz) via bed scale   Body mass index is 27.85 kg/m²., BMI classification: overweight   Diet order: regular  PO intake: 0% x1 meal, 50-75% x2 meals, <25% x1 meal    Evaluation:   Labs: HbA1c 5.6 upon admit, anemia noted   Medications: Cymbalta, folic acid, Crestor, Senokot (refused), thiamine, NS infusion (continuous), Bowel regimen (PRN), Ativan (PRN), Zofran (PRN)   CIWA score of 14  GI: WDL, LBM 10/5/23 per flowsheet   Fluid accumulation: trace edema of bilateral LE, +1 edema of LUE  Skin: partial thickness wound of Left arm per flowsheet   Wt hx: records indicate no recent significant wt changes   Wt Readings from Last 10 Encounters:   10/06/23 98.4 kg (216 lb 14.9 oz)   09/29/23 98.4 kg (216 lb 14.9 oz)   06/29/23 98.4 kg (217 lb)   05/15/23 99 kg (218 lb 4.8 oz)   11/14/22 96.2 kg (212 lb)   09/13/22 98 kg (216 lb)   10/04/20 99.3 kg (219 lb)   08/21/18 101 kg (222 lb 7.1 oz)       Malnutrition Risk: Pt does not meet ASPEN criteria for malnutrition at this time    Recommendations/Plan:  Continue current diet order   Encourage intake of meals  Food preferences discussed   Document intake of all meals  as % taken in ADL's to provide interdisciplinary communication " across all shifts.   Achieve consistent PO intake > 50% of meals  Monitor weight.  Nutrition rep will continue to see patient for ongoing meal and snack preferences.   Obtain ONS order per RD if indicated     RD following

## 2023-10-06 NOTE — PROGRESS NOTES
Bedside report received. Pt A&Ox4. Complains of no pain at this time. SR 80 on the monitors. Call light and belongings within reach. Bed locked and in lowest position. Alarm and fall precautions in place. Seizure precautions in place.

## 2023-10-06 NOTE — THERAPY
"Occupational Therapy   Initial Evaluation     Patient Name: Balwinder Grimes  Age:  72 y.o., Sex:  male  Medical Record #: [id number]  Today's Date: 10/6/2023     Precautions: Fall Risk    Assessment    Patient is 72 y.o. male with h/o HTN, BPH, HLD, ETOH abuse, essential tremor, admitted following syncopal episode with fall and head strike. CT negative. Pt dx with anemia, alcohol withdrawal. Pt seen for OT eval and treatment. See grid below for details. Pt oriented and following, but decreased awareness of deficits; attempts to mobilize without assist. Pt able to transfer to chair with assist, but very unsteady. Further mobility deferred due to fall risk. Incontinent of urine while seated in chair. Pt's BUE extremely tremulous, negatively impacting functional use during ADL.Treatment included: education on use of call light, safe transfer sequence, compensatory ADL. Pt is below functional baseline from OT standpoint and will benefit from ongoing acute OT. Will follow.     Plan    Occupational Therapy Initial Treatment Plan   Treatment Interventions: Self Care / Activities of Daily Living, Adaptive Equipment, Cognitive Skill Development, Neuro Re-Education / Balance, Therapeutic Exercises, Therapeutic Activity  Treatment Frequency: 3 Times per Week  Duration: Until Therapy Goals Met    DC Equipment Recommendations: Unable to determine at this time  Discharge Recommendations: Recommend post-acute placement for additional occupational therapy services prior to discharge home. Pt may progress to being appropriate to DC home with spousal support and HH. Will continue to assess and update recs.     Subjective    \"No, but there will be when I get home.\" (when asked if shower had grab bar in place)     Objective       10/06/23 1219   Prior Living Situation   Prior Services None;Home-Independent   Housing / Facility 1 Story House   Steps Into Home 2   Steps In Home 0   Bathroom Set up Walk In Shower;Built-In Shower Chair "   Equipment Owned None   Lives with - Patient's Self Care Capacity Spouse   Comments Pt lives with spouse   Prior Level of ADL Function   Comments Pt reports being independent with BADL PTA. 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 Need to clarify due to confusion   Vitals   Patient BP Position Sitting   Blood Pressure  136/78   Pulse Oximetry 95 %   O2 (LPM) 1   O2 Delivery Device Silicone Nasal Cannula   Cognition    Orientation Level Not Oriented to Reason;Not Oriented to Day;Not Oriented to Time   Level of Consciousness Alert  (lethargic start of session, improved alertness as session progressed)   Safety Awareness Impaired;Impulsive  (attempted to stand without assist post session)   New Learning Impaired   Attention Impaired   Comments Pleasant & cooperative, but decreased awareness/safety   Active ROM Upper Body   Dominant Hand Right   Rt Shoulder Flexion Degrees 140   Lt Shoulder Flexion Degrees 160   Comments Pt reports prior injury to R shoulder   Strength Upper Body   Upper Body Strength  WDL   Coordination Upper Body   Comments variable tremor BUE, profound at times; negatively impacts functional use; chart indicates baseline essential tremor   Balance Assessment   Sitting Balance (Static) Fair -   Sitting Balance (Dynamic) Fair -   Standing Balance (Static) Poor +   Standing Balance (Dynamic) Poor -   Weight Shift Sitting Fair   Weight Shift Standing Poor   Comments standing with FWW and assist of 2   Bed Mobility    Supine to Sit Moderate Assist   Sit to Supine   (up to chair post)   Scooting Minimal Assist  (seated)   ADL Assessment   Grooming Minimal Assist  (all-in-one oral care)    Lower Body Dressing Maximal Assist  (doff/don B socks)   Toileting Total Assist  (incontinent of urine during session)   Functional Mobility   Sit to Stand Minimal Assist   Bed, Chair, Wheelchair Transfer Moderate Assist   Transfer Method Stand Pivot   Mobility bed mobility, transfer to chair   Visual Perception   Visual Acuity Wears Corrective Lenses   Comments glasses not present; pt reports poor acuity without   Short Term Goals   Short Term Goal # 1 Pt will complete ADL transfers with supv   Short Term Goal # 2 Pt will complete toileting with SBA   Short Term Goal # 3 Pt will complete LB dressing with SBA   Education Group   Role of Occupational Therapist Patient Response Patient;Acceptance;Explanation;Verbal Demonstration;Reinforcement Needed   Transfers Patient Response Patient;Acceptance;Explanation;Demonstration;Reinforcement Needed  (hand placement, safe completion of turn)   Use of Call Light Patient Response Patient;Explanation;Reinforcement Needed  (pt attempted to mobilize self prior to calling)

## 2023-10-06 NOTE — PROGRESS NOTES
Assumed care of patient, bedside report received from yuly RN. Updated on POC, call light within reach and fall precautions in place. Bed locked and in lowest position. Patient instructed to call for assistance before getting out of bed. All questions answered, no other needs at this time.

## 2023-10-07 PROBLEM — R50.9 FEVER: Status: ACTIVE | Noted: 2023-10-07

## 2023-10-07 LAB
ALBUMIN SERPL BCP-MCNC: 2.9 G/DL (ref 3.2–4.9)
ALBUMIN/GLOB SERPL: 1.1 G/DL
ALP SERPL-CCNC: 96 U/L (ref 30–99)
ALT SERPL-CCNC: 18 U/L (ref 2–50)
ANION GAP SERPL CALC-SCNC: 10 MMOL/L (ref 7–16)
APPEARANCE UR: CLEAR
AST SERPL-CCNC: 20 U/L (ref 12–45)
BACTERIA #/AREA URNS HPF: NEGATIVE /HPF
BACTERIA UR CULT: NORMAL
BASOPHILS # BLD AUTO: 0.5 % (ref 0–1.8)
BASOPHILS # BLD: 0.03 K/UL (ref 0–0.12)
BILIRUB SERPL-MCNC: 1.2 MG/DL (ref 0.1–1.5)
BILIRUB UR QL STRIP.AUTO: ABNORMAL
BUN SERPL-MCNC: 7 MG/DL (ref 8–22)
CALCIUM ALBUM COR SERPL-MCNC: 8.8 MG/DL (ref 8.5–10.5)
CALCIUM SERPL-MCNC: 7.9 MG/DL (ref 8.5–10.5)
CHLORIDE SERPL-SCNC: 106 MMOL/L (ref 96–112)
CO2 SERPL-SCNC: 20 MMOL/L (ref 20–33)
COLOR UR: ABNORMAL
CREAT SERPL-MCNC: 0.59 MG/DL (ref 0.5–1.4)
EOSINOPHIL # BLD AUTO: 0.17 K/UL (ref 0–0.51)
EOSINOPHIL NFR BLD: 3.1 % (ref 0–6.9)
EPI CELLS #/AREA URNS HPF: NEGATIVE /HPF
ERYTHROCYTE [DISTWIDTH] IN BLOOD BY AUTOMATED COUNT: 45.2 FL (ref 35.9–50)
FLUAV RNA SPEC QL NAA+PROBE: NEGATIVE
FLUBV RNA SPEC QL NAA+PROBE: NEGATIVE
GFR SERPLBLD CREATININE-BSD FMLA CKD-EPI: 103 ML/MIN/1.73 M 2
GLOBULIN SER CALC-MCNC: 2.6 G/DL (ref 1.9–3.5)
GLUCOSE SERPL-MCNC: 112 MG/DL (ref 65–99)
GLUCOSE UR STRIP.AUTO-MCNC: NEGATIVE MG/DL
HCT VFR BLD AUTO: 36.4 % (ref 42–52)
HGB BLD-MCNC: 12.5 G/DL (ref 14–18)
HYALINE CASTS #/AREA URNS LPF: ABNORMAL /LPF
IMM GRANULOCYTES # BLD AUTO: 0.01 K/UL (ref 0–0.11)
IMM GRANULOCYTES NFR BLD AUTO: 0.2 % (ref 0–0.9)
KETONES UR STRIP.AUTO-MCNC: 15 MG/DL
LEUKOCYTE ESTERASE UR QL STRIP.AUTO: ABNORMAL
LYMPHOCYTES # BLD AUTO: 0.7 K/UL (ref 1–4.8)
LYMPHOCYTES NFR BLD: 12.6 % (ref 22–41)
MAGNESIUM SERPL-MCNC: 1.9 MG/DL (ref 1.5–2.5)
MCH RBC QN AUTO: 36.5 PG (ref 27–33)
MCHC RBC AUTO-ENTMCNC: 34.3 G/DL (ref 32.3–36.5)
MCV RBC AUTO: 106.4 FL (ref 81.4–97.8)
MICRO URNS: ABNORMAL
MONOCYTES # BLD AUTO: 1.03 K/UL (ref 0–0.85)
MONOCYTES NFR BLD AUTO: 18.5 % (ref 0–13.4)
NEUTROPHILS # BLD AUTO: 3.63 K/UL (ref 1.82–7.42)
NEUTROPHILS NFR BLD: 65.1 % (ref 44–72)
NITRITE UR QL STRIP.AUTO: NEGATIVE
NRBC # BLD AUTO: 0 K/UL
NRBC BLD-RTO: 0 /100 WBC (ref 0–0.2)
PH UR STRIP.AUTO: 5.5 [PH] (ref 5–8)
PLATELET # BLD AUTO: 106 K/UL (ref 164–446)
PLATELETS.RETICULATED NFR BLD AUTO: 4.9 % (ref 0.6–13.1)
PMV BLD AUTO: 10.5 FL (ref 9–12.9)
POTASSIUM SERPL-SCNC: 3.6 MMOL/L (ref 3.6–5.5)
PROT SERPL-MCNC: 5.5 G/DL (ref 6–8.2)
PROT UR QL STRIP: NEGATIVE MG/DL
RBC # BLD AUTO: 3.42 M/UL (ref 4.7–6.1)
RBC # URNS HPF: ABNORMAL /HPF
RBC UR QL AUTO: NEGATIVE
RSV RNA SPEC QL NAA+PROBE: NEGATIVE
SARS-COV-2 RNA RESP QL NAA+PROBE: NOTDETECTED
SIGNIFICANT IND 70042: NORMAL
SITE SITE: NORMAL
SODIUM SERPL-SCNC: 136 MMOL/L (ref 135–145)
SOURCE SOURCE: NORMAL
SP GR UR STRIP.AUTO: 1.02
SPECIMEN SOURCE: NORMAL
UROBILINOGEN UR STRIP.AUTO-MCNC: 1 MG/DL
WBC # BLD AUTO: 5.6 K/UL (ref 4.8–10.8)
WBC #/AREA URNS HPF: ABNORMAL /HPF

## 2023-10-07 PROCEDURE — 99232 SBSQ HOSP IP/OBS MODERATE 35: CPT | Mod: GC | Performed by: STUDENT IN AN ORGANIZED HEALTH CARE EDUCATION/TRAINING PROGRAM

## 2023-10-07 PROCEDURE — A9270 NON-COVERED ITEM OR SERVICE: HCPCS

## 2023-10-07 PROCEDURE — A9270 NON-COVERED ITEM OR SERVICE: HCPCS | Performed by: EMERGENCY MEDICINE

## 2023-10-07 PROCEDURE — A9270 NON-COVERED ITEM OR SERVICE: HCPCS | Performed by: BEHAVIOR ANALYST

## 2023-10-07 PROCEDURE — 83735 ASSAY OF MAGNESIUM: CPT

## 2023-10-07 PROCEDURE — C9803 HOPD COVID-19 SPEC COLLECT: HCPCS | Performed by: BEHAVIOR ANALYST

## 2023-10-07 PROCEDURE — 700111 HCHG RX REV CODE 636 W/ 250 OVERRIDE (IP): Mod: JZ | Performed by: STUDENT IN AN ORGANIZED HEALTH CARE EDUCATION/TRAINING PROGRAM

## 2023-10-07 PROCEDURE — 770020 HCHG ROOM/CARE - TELE (206)

## 2023-10-07 PROCEDURE — 80053 COMPREHEN METABOLIC PANEL: CPT

## 2023-10-07 PROCEDURE — 85055 RETICULATED PLATELET ASSAY: CPT

## 2023-10-07 PROCEDURE — 36415 COLL VENOUS BLD VENIPUNCTURE: CPT

## 2023-10-07 PROCEDURE — 700111 HCHG RX REV CODE 636 W/ 250 OVERRIDE (IP)

## 2023-10-07 PROCEDURE — 700102 HCHG RX REV CODE 250 W/ 637 OVERRIDE(OP)

## 2023-10-07 PROCEDURE — 700105 HCHG RX REV CODE 258

## 2023-10-07 PROCEDURE — 0241U HCHG SARS-COV-2 COVID-19 NFCT DS RESP RNA 4 TRGT MIC: CPT

## 2023-10-07 PROCEDURE — 700102 HCHG RX REV CODE 250 W/ 637 OVERRIDE(OP): Performed by: EMERGENCY MEDICINE

## 2023-10-07 PROCEDURE — 85025 COMPLETE CBC W/AUTO DIFF WBC: CPT

## 2023-10-07 PROCEDURE — 700102 HCHG RX REV CODE 250 W/ 637 OVERRIDE(OP): Performed by: BEHAVIOR ANALYST

## 2023-10-07 PROCEDURE — 700102 HCHG RX REV CODE 250 W/ 637 OVERRIDE(OP): Performed by: STUDENT IN AN ORGANIZED HEALTH CARE EDUCATION/TRAINING PROGRAM

## 2023-10-07 PROCEDURE — 51798 US URINE CAPACITY MEASURE: CPT

## 2023-10-07 PROCEDURE — 700105 HCHG RX REV CODE 258: Performed by: STUDENT IN AN ORGANIZED HEALTH CARE EDUCATION/TRAINING PROGRAM

## 2023-10-07 PROCEDURE — A9270 NON-COVERED ITEM OR SERVICE: HCPCS | Performed by: STUDENT IN AN ORGANIZED HEALTH CARE EDUCATION/TRAINING PROGRAM

## 2023-10-07 PROCEDURE — 81001 URINALYSIS AUTO W/SCOPE: CPT

## 2023-10-07 RX ADMIN — CHLORDIAZEPOXIDE HYDROCHLORIDE 25 MG: 25 CAPSULE ORAL at 00:38

## 2023-10-07 RX ADMIN — THIAMINE HYDROCHLORIDE 200 MG: 100 INJECTION, SOLUTION INTRAMUSCULAR; INTRAVENOUS at 20:11

## 2023-10-07 RX ADMIN — CHLORDIAZEPOXIDE HYDROCHLORIDE 25 MG: 25 CAPSULE ORAL at 20:13

## 2023-10-07 RX ADMIN — SODIUM CHLORIDE: 9 INJECTION, SOLUTION INTRAVENOUS at 05:05

## 2023-10-07 RX ADMIN — ROSUVASTATIN CALCIUM 20 MG: 20 TABLET, FILM COATED ORAL at 17:38

## 2023-10-07 RX ADMIN — POLYMYXIN B SULFATE AND TRIMETHOPRIM 1 DROP: 10000; 1 SOLUTION OPHTHALMIC at 17:48

## 2023-10-07 RX ADMIN — CHLORDIAZEPOXIDE HYDROCHLORIDE 25 MG: 25 CAPSULE ORAL at 04:57

## 2023-10-07 RX ADMIN — LORAZEPAM 2 MG: 2 TABLET ORAL at 05:40

## 2023-10-07 RX ADMIN — ENOXAPARIN SODIUM 40 MG: 100 INJECTION SUBCUTANEOUS at 17:38

## 2023-10-07 RX ADMIN — FOLIC ACID 1 MG: 1 TABLET ORAL at 04:57

## 2023-10-07 RX ADMIN — THIAMINE HYDROCHLORIDE 200 MG: 100 INJECTION, SOLUTION INTRAMUSCULAR; INTRAVENOUS at 08:11

## 2023-10-07 RX ADMIN — POLYMYXIN B SULFATE AND TRIMETHOPRIM 1 DROP: 10000; 1 SOLUTION OPHTHALMIC at 08:09

## 2023-10-07 RX ADMIN — SENNOSIDES AND DOCUSATE SODIUM 2 TABLET: 50; 8.6 TABLET ORAL at 04:59

## 2023-10-07 RX ADMIN — THERA TABS 1 TABLET: TAB at 08:07

## 2023-10-07 RX ADMIN — LORAZEPAM 1 MG: 2 INJECTION INTRAMUSCULAR; INTRAVENOUS at 01:48

## 2023-10-07 RX ADMIN — DOXAZOSIN 4 MG: 2 TABLET ORAL at 17:38

## 2023-10-07 RX ADMIN — POLYMYXIN B SULFATE AND TRIMETHOPRIM 1 DROP: 10000; 1 SOLUTION OPHTHALMIC at 13:09

## 2023-10-07 RX ADMIN — LOSARTAN POTASSIUM 50 MG: 50 TABLET, FILM COATED ORAL at 04:58

## 2023-10-07 RX ADMIN — POLYMYXIN B SULFATE AND TRIMETHOPRIM 1 DROP: 10000; 1 SOLUTION OPHTHALMIC at 20:16

## 2023-10-07 RX ADMIN — CHLORDIAZEPOXIDE HYDROCHLORIDE 25 MG: 25 CAPSULE ORAL at 13:21

## 2023-10-07 RX ADMIN — LORAZEPAM 2 MG: 2 TABLET ORAL at 08:08

## 2023-10-07 RX ADMIN — THIAMINE HYDROCHLORIDE 200 MG: 100 INJECTION, SOLUTION INTRAMUSCULAR; INTRAVENOUS at 15:44

## 2023-10-07 RX ADMIN — LORAZEPAM 2 MG: 2 TABLET ORAL at 03:43

## 2023-10-07 RX ADMIN — LORAZEPAM 3 MG: 1 TABLET ORAL at 06:47

## 2023-10-07 RX ADMIN — SODIUM CHLORIDE: 9 INJECTION, SOLUTION INTRAVENOUS at 13:27

## 2023-10-07 RX ADMIN — DULOXETINE HYDROCHLORIDE 30 MG: 30 CAPSULE, DELAYED RELEASE ORAL at 04:57

## 2023-10-07 RX ADMIN — SODIUM CHLORIDE: 9 INJECTION, SOLUTION INTRAVENOUS at 22:41

## 2023-10-07 ASSESSMENT — LIFESTYLE VARIABLES
AUDITORY DISTURBANCES: NOT PRESENT
AGITATION: *
NAUSEA AND VOMITING: NO NAUSEA AND NO VOMITING
TOTAL SCORE: 13
NAUSEA AND VOMITING: NO NAUSEA AND NO VOMITING
AGITATION: NORMAL ACTIVITY
HEADACHE, FULLNESS IN HEAD: NOT PRESENT
TREMOR: MODERATE TREMOR WITH ARMS EXTENDED
AUDITORY DISTURBANCES: NOT PRESENT
PAROXYSMAL SWEATS: NO SWEAT VISIBLE
AUDITORY DISTURBANCES: NOT PRESENT
ANXIETY: NO ANXIETY (AT EASE)
TREMOR: MODERATE TREMOR WITH ARMS EXTENDED
PAROXYSMAL SWEATS: NO SWEAT VISIBLE
ORIENTATION AND CLOUDING OF SENSORIUM: DATE DISORIENTATION BY MORE THAN TWO CALENDAR DAYS
TOTAL SCORE: 14
NAUSEA AND VOMITING: NO NAUSEA AND NO VOMITING
TOTAL SCORE: 17
AGITATION: *
PAROXYSMAL SWEATS: NO SWEAT VISIBLE
ORIENTATION AND CLOUDING OF SENSORIUM: DATE DISORIENTATION BY NO MORE THAN TWO CALENDAR DAYS
TOTAL SCORE: 7
ORIENTATION AND CLOUDING OF SENSORIUM: DATE DISORIENTATION BY MORE THAN TWO CALENDAR DAYS
PAROXYSMAL SWEATS: NO SWEAT VISIBLE
TREMOR: MODERATE TREMOR WITH ARMS EXTENDED
NAUSEA AND VOMITING: NO NAUSEA AND NO VOMITING
AUDITORY DISTURBANCES: NOT PRESENT
AGITATION: SOMEWHAT MORE THAN NORMAL ACTIVITY
TOTAL SCORE: 8
TREMOR: MODERATE TREMOR WITH ARMS EXTENDED
ORIENTATION AND CLOUDING OF SENSORIUM: DATE DISORIENTATION BY MORE THAN TWO CALENDAR DAYS
VISUAL DISTURBANCES: VERY MILD SENSITIVITY
TOTAL SCORE: MODERATELY SEVERE HALLUCINATIONS
HEADACHE, FULLNESS IN HEAD: NOT PRESENT
AUDITORY DISTURBANCES: NOT PRESENT
HEADACHE, FULLNESS IN HEAD: NOT PRESENT
TOTAL SCORE: 13
AUDITORY DISTURBANCES: NOT PRESENT
AGITATION: SOMEWHAT MORE THAN NORMAL ACTIVITY
TREMOR: MODERATE TREMOR WITH ARMS EXTENDED
AGITATION: NORMAL ACTIVITY
TOTAL SCORE: MODERATELY SEVERE HALLUCINATIONS
VISUAL DISTURBANCES: MILD SENSITIVITY
ANXIETY: NO ANXIETY (AT EASE)
HEADACHE, FULLNESS IN HEAD: NOT PRESENT
ANXIETY: *
TOTAL SCORE: MODERATELY SEVERE HALLUCINATIONS
VISUAL DISTURBANCES: VERY MILD SENSITIVITY
NAUSEA AND VOMITING: NO NAUSEA AND NO VOMITING
TOTAL SCORE: 13
ANXIETY: MILDLY ANXIOUS
NAUSEA AND VOMITING: NO NAUSEA AND NO VOMITING
AUDITORY DISTURBANCES: NOT PRESENT
VISUAL DISTURBANCES: VERY MILD SENSITIVITY
ANXIETY: MILDLY ANXIOUS
HEADACHE, FULLNESS IN HEAD: NOT PRESENT
PAROXYSMAL SWEATS: NO SWEAT VISIBLE
TREMOR: MODERATE TREMOR WITH ARMS EXTENDED
TREMOR: MODERATE TREMOR WITH ARMS EXTENDED
PAROXYSMAL SWEATS: NO SWEAT VISIBLE
HEADACHE, FULLNESS IN HEAD: NOT PRESENT
ORIENTATION AND CLOUDING OF SENSORIUM: DATE DISORIENTATION BY NO MORE THAN TWO CALENDAR DAYS
HEADACHE, FULLNESS IN HEAD: NOT PRESENT
ANXIETY: *
ORIENTATION AND CLOUDING OF SENSORIUM: DATE DISORIENTATION BY NO MORE THAN TWO CALENDAR DAYS
NAUSEA AND VOMITING: NO NAUSEA AND NO VOMITING
VISUAL DISTURBANCES: VERY MILD SENSITIVITY
VISUAL DISTURBANCES: MILD SENSITIVITY
PAROXYSMAL SWEATS: NO SWEAT VISIBLE
AGITATION: SOMEWHAT MORE THAN NORMAL ACTIVITY
ORIENTATION AND CLOUDING OF SENSORIUM: DATE DISORIENTATION BY NO MORE THAN TWO CALENDAR DAYS
VISUAL DISTURBANCES: MILD SENSITIVITY
ANXIETY: MILDLY ANXIOUS
TOTAL SCORE: MODERATELY SEVERE HALLUCINATIONS

## 2023-10-07 ASSESSMENT — PAIN DESCRIPTION - PAIN TYPE: TYPE: ACUTE PAIN

## 2023-10-07 ASSESSMENT — FIBROSIS 4 INDEX: FIB4 SCORE: 4.72

## 2023-10-07 NOTE — PROGRESS NOTES
Bedside report received from day RN Niurka, pt care assumed, assessment completed. Pt is A&Ox3, pain 0/10, SR on the monitor. Updated on POC, questions answered. Bed in lowest, locked position, treaded socks on, call light and belongings within reach. Fall precautions in place.

## 2023-10-07 NOTE — PROGRESS NOTES
[facility] FAMILY MEDICINE PROGRESS NOTE     Attending:   Salo Graff MD    Resident:   Senior: Frankie Leroy MD  Carlitos: Susan Bradshaw MD    PATIENT:   Balwinder Grimes; [id number]; 1950    ID:   72 y.o. male admitted for syncope and CIWA    SUBJECTIVE:   No acute overnight events. Patient was febrile at 0407 with a temperature of 100.6. Patient seen at bedside, he declined to open his eyes and was mumbling answers. Patient denied fevers, chills, chest pain, shortness of breath, cough, abdominal pain, diarrhea and dysuria.  Patient was alert to self and the year 2023.  However patient thought that he was in his garage, that the month was August and did not know why he was at the hospital.    OBJECTIVE:  Vitals:    10/07/23 0647 10/07/23 0755 10/07/23 0817 10/07/23 1215   BP: 138/80  136/87 124/76   Pulse: 83  (!) 105 86   Resp: 20  18 18   Temp:  37.3 °C (99.1 °F) 37.3 °C (99.1 °F) 37.3 °C (99.1 °F)   TempSrc:  Temporal Temporal Temporal   SpO2:   92% 96%   Weight:       Height:             Intake/Output Summary (Last 24 hours) at 10/7/2023 1526  Last data filed at 10/7/2023 1214  Gross per 24 hour   Intake 300 ml   Output 800 ml   Net -500 ml       PHYSICAL EXAM:   General: No acute distress, afebrile, resting comfortably, conversational   HEENT: NC/AT. EOMI. eyes crusted together with conjunctivitis bilaterally  Cardiovascular: RRR without murmurs, rubs, heaves. Normal capillary refill   Respiratory: CTAB, no tachypnea or retractions   Abdomen: normal bowel sounds, soft, nontender, nondistended, no masses, no organomegaly   EXT:  DRIVER, no edema  Skin: No erythema/lesions   Neuro: Non-focal, alert and orientated x2, essential tremor in bilateral hands present with movement    LABS:  Recent Labs     10/04/23  1555 10/05/23  0057 10/07/23  0844   WBC 6.1 5.4 5.6   RBC 3.52* 3.38* 3.42*   HEMOGLOBIN 12.7* 12.3* 12.5*   HEMATOCRIT 36.8* 35.5* 36.4*   .5* 105.0* 106.4*   MCH 36.1* 36.4* 36.5*   RDW 46.2 45.1 45.2  "  PLATELETCT 91* 91* 106*   MPV 10.4 10.3 10.5   NEUTSPOLYS 60.50  --  65.10   LYMPHOCYTES 15.80*  --  12.60*   MONOCYTES 20.50*  --  18.50*   EOSINOPHILS 2.10  --  3.10   BASOPHILS 0.80  --  0.50     Recent Labs     10/04/23  1555 10/05/23  0057 10/06/23  0032 10/07/23  0844   SODIUM 138 138 138 136   POTASSIUM 3.3* 3.4* 3.8 3.6   CHLORIDE 102 103 108 106   CO2 21 23 17* 20   BUN 12 12 9 7*   CREATININE 0.83 0.76 0.58 0.59   CALCIUM 8.7 8.3* 8.2* 7.9*   MAGNESIUM 2.1  --  2.0 1.9   PHOSPHORUS  --   --  3.4  --    ALBUMIN 3.4 3.6 3.2 2.9*     Estimated GFR/CRCL = Estimated Creatinine Clearance: 131.6 mL/min (by C-G formula based on SCr of 0.59 mg/dL).  Recent Labs     10/05/23  0057 10/06/23  0032 10/07/23  0844   GLUCOSE 105* 99 112*     Recent Labs     10/05/23  0057 10/06/23  0032 10/07/23  0844   ASTSGOT 33 28 20   ALTSGPT 26 22 18   TBILIRUBIN 1.3 1.0 1.2   ALKPHOSPHAT 99 92 96   GLOBULIN 2.3 2.4 2.6             No results for input(s): \"INR\", \"APTT\", \"FIBRINOGEN\" in the last 72 hours.    Invalid input(s): \"DIMER\"      IMAGING:  DX-CHEST-PORTABLE (1 VIEW)   Final Result      No acute cardiopulmonary abnormality.      CT-CSPINE WITHOUT PLUS RECONS   Final Result      No acute fracture or dislocation of the cervical spine.      CT-HEAD W/O   Final Result      1.  Cerebral atrophy.      2.  White matter lucencies most consistent with small vessel ischemic change versus demyelination or gliosis.      3.  Otherwise, Head CT without contrast with no acute findings. No evidence of acute cerebral infarction, hemorrhage or mass lesion.             MEDS:  Current Facility-Administered Medications   Medication Last Admin    multivitamin tablet 1 Tablet 1 Tablet at 10/07/23 0807    polymixin-trimethoprim (Polytrim) 83704-7.1 UNIT/ML-% ophthalmic solution 1 Drop 1 Drop at 10/07/23 1309    thiamine (B-1) 200 mg in dextrose 5% 100 mL IVPB 200 mg at 10/07/23 0811    chlordiazePOXIDE (Librium) capsule 25 mg 25 mg at 10/07/23 " "1321    labetalol (Normodyne/Trandate) injection 10 mg      folic acid (Folvite) tablet 1 mg 1 mg at 10/07/23 0457    senna-docusate (Pericolace Or Senokot S) 8.6-50 MG per tablet 2 Tablet 2 Tablet at 10/07/23 0459    And    polyethylene glycol/lytes (Miralax) PACKET 1 Packet      And    magnesium hydroxide (Milk Of Magnesia) suspension 30 mL      And    bisacodyl (Dulcolax) suppository 10 mg      NS infusion New Bag at 10/07/23 1327    enoxaparin (Lovenox) inj 40 mg 40 mg at 10/06/23 2036    acetaminophen (Tylenol) tablet 650 mg      ondansetron (Zofran) syringe/vial injection 4 mg      ondansetron (Zofran ODT) dispertab 4 mg      LORazepam (Ativan) tablet 0.5 mg      LORazepam (Ativan) tablet 1 mg 1 mg at 10/06/23 2252    Or    LORazepam (Ativan) injection 0.5 mg      LORazepam (Ativan) tablet 2 mg 2 mg at 10/07/23 0808    Or    LORazepam (Ativan) injection 1 mg 1 mg at 10/07/23 0148    LORazepam (Ativan) tablet 3 mg 3 mg at 10/07/23 0647    Or    LORazepam (Ativan) injection 1.5 mg 1.5 mg at 10/06/23 1523    LORazepam (Ativan) tablet 4 mg      Or    LORazepam (Ativan) injection 2 mg 2 mg at 10/06/23 1619    doxazosin (Cardura) tablet 4 mg 4 mg at 10/05/23 1721    DULoxetine (Cymbalta) capsule 30 mg 30 mg at 10/07/23 0457    losartan (Cozaar) tablet 50 mg 50 mg at 10/07/23 0458    rosuvastatin (Crestor) tablet 20 mg 20 mg at 10/05/23 1721       ASSESSMENT/PLAN:    * Alcohol abuse  Assessment & Plan  #withdrawls  #CIWA  #alcohol withdrawal hallucinations    Pt drinks 1-2 bottles of wine per night. Seems to be related to stress care giving of wife. Patient reports   his last drink was five days ago to admitting MD, this AM he reporte last drink 8d prior to admission, but then stated he was drinking with his buddies 2d prior to admission; pt states he is in a \"detox\" program.    CIWA 8-17, automatically gets a 4 for his baseline tremor, required 16.5 mg of Ativan in 24 hours    Plan:   - CIWA protocol in place, if " worsening reach out to Intensivist for evaluation  - Librium 25mg Q6H started on 10/7/23  - Prior to dc patient is interested in trying naltrexone   - Provide resources for AA at dc   - Continue multivitamin and thiamine supplement     Fever  Assessment & Plan  Patient with fever of 100.6F  on 10/7/23 at 0407. Patient denied feeling feverish or chills.    -Respiratory panel pending  -UA due to Maza in place    Conjunctivitis of both eyes  Assessment & Plan  Bilateral conjunctivitis of the eye, with dried discharge on the eyelids.     - Trimethoprim-polymyxin four times daily for 7 days    Elevated troponin  Assessment & Plan  Pt with troponin at 29 and 31 on admission. EKG unremarkable. Pt with no chest pain. Mildly elevated troponin could be due to low volume status. Patient denies chest pain.  Plan:     - If symptoms arise can get EKG and troponin    Macrocytic anemia  Assessment & Plan  Pt with hemoglobin of 12.7 and MCV at 104.5 on admission; Likely due to heavy alcohol use.     Plan:   - Continue to monitor CBC    - Continue multivitamin and thiamine supplement     Thrombocytopenia (HCC)  Assessment & Plan  Pt with chronically low platelets. This is likely due to heavy alcohol use.   Plan:   - Continue to monitor CBC     Essential tremor  Assessment & Plan  Pt reports hx of essential tremor. Alcohol use helps reduce symptoms. Has not tried medications in the past.   Plan:   - Ordered OT  - Consider medications, possible beta blocker with improving CIWA      Hypokalemia  Assessment & Plan  Low on admission, improving    Plan:   - Will replete potassium prn  - Monitor CMP daily     Syncope and collapse- (present on admission)  Assessment & Plan  Pt with lightheadedness and syncope. CT head was unremarkable.  Likely due to orthostatic hypotension and deconditioning. Pt also with chronic alcoholism.     After extensive hx taking (pt poor hx), likely syncope d/t dehydration, hypovolemia, vs intoxication    Plan:    - PT recommend front wheel walker with home health at discharge, will continue to follow  - OT  - Pending orthostatic vitals   - Continue  mL/hr, discontinue if fluid overload   - Will also have nutrition evaluate at patient as intake has been poor     Neuropathy- (present on admission)  Assessment & Plan  Patient reports chronic neuropathy. He denies hx of diabetes however hba1c 3 months ago was 6. Previously on gabapentin with no relief.   Plan:   - HgbA1c 5.6  - PT recommending front wheel walker and home health PT and will continue to follow    Hyperlipidemia- (present on admission)  Assessment & Plan  Continue home medication of rosuvastatin 20 mg daily     Benign prostatic hyperplasia without lower urinary tract symptoms- (present on admission)  Assessment & Plan  Continue home medication of doxazosin 4 mg     Primary hypertension- (present on admission)  Assessment & Plan  Pt with pmhx of hypertension, takes losartan 50 mg.   Plan:   - Continue home medication   - PRN antihypertension for SBP greater than 180 or DBP greater than 110         Code Status: Full    Dispo: inpt status for further medical management

## 2023-10-07 NOTE — CARE PLAN
The patient is Stable - Low risk of patient condition declining or worsening    Shift Goals  Clinical Goals: CIWA, safety, monitor vitals  Patient Goals: sleep  Family Goals: n/a    Progress made toward(s) clinical / shift goals:    Problem: Optimal Care for Alcohol Withdrawal  Goal: Optimal Care for the alcohol withdrawal patient  Outcome: Progressing     Problem: Seizure Precautions  Goal: Implementation of seizure precautions  Outcome: Progressing  Note: Seizure precautions in place.      Problem: Risk for Aspiration  Goal: Patient's risk for aspiration will be absent or decrease  Outcome: Progressing  Note: Pt has remained free from aspiration throughout shift. Precautions in place to prevent aspiration.      Problem: Fall Risk  Goal: Patient will remain free from falls  Outcome: Progressing  Note: Pt has remained free from falls throughout shift.      Problem: Knowledge Deficit - Standard  Goal: Patient and family/care givers will demonstrate understanding of plan of care, disease process/condition, diagnostic tests and medications  Outcome: Progressing

## 2023-10-07 NOTE — PROGRESS NOTES
Assumed care at 0700. Bedside report received from Britany. Patient's chart and MAR reviewed. Pt denies pain at this time. Pt is A & O 2. Patient was updated on plan of care for the day. Questions answered and concerns addressed.  Pt denies any additional needs at this time. White board updated. Call light, phone and personal belongings within reach.

## 2023-10-07 NOTE — ASSESSMENT & PLAN NOTE
Patient with fever of 100.6F  on 10/7/23 at 0407. Patient denied feeling feverish or chills.    -Respiratory panel negative  -UA and urine culture obtained with [facility] placement on 10/8/23 due to flank pain despite being afebrile

## 2023-10-07 NOTE — PROGRESS NOTES
Monitor Summary  Rhythm: SR  Rate: 65-72  Ectopy: fPVC, rPVC, rPAC, fPAC  .19 / .07 / .37

## 2023-10-07 NOTE — PROGRESS NOTES
Monitor Summary:  Rhythm: SR  Rate: 72-99  Ectopy: (R) PVC, (R) PAC  Measurement:  .18/.08/.40

## 2023-10-08 PROBLEM — N40.1 BENIGN PROSTATIC HYPERPLASIA WITH URINARY RETENTION: Status: ACTIVE | Noted: 2022-09-13

## 2023-10-08 PROBLEM — R33.8 BENIGN PROSTATIC HYPERPLASIA WITH URINARY RETENTION: Status: ACTIVE | Noted: 2022-09-13

## 2023-10-08 LAB
ALBUMIN SERPL BCP-MCNC: 3 G/DL (ref 3.2–4.9)
ALBUMIN/GLOB SERPL: 1.3 G/DL
ALP SERPL-CCNC: 96 U/L (ref 30–99)
ALT SERPL-CCNC: 14 U/L (ref 2–50)
ANION GAP SERPL CALC-SCNC: 12 MMOL/L (ref 7–16)
APPEARANCE UR: CLEAR
AST SERPL-CCNC: 14 U/L (ref 12–45)
BACTERIA #/AREA URNS HPF: NEGATIVE /HPF
BASOPHILS # BLD AUTO: 0.6 % (ref 0–1.8)
BASOPHILS # BLD: 0.03 K/UL (ref 0–0.12)
BILIRUB SERPL-MCNC: 1 MG/DL (ref 0.1–1.5)
BILIRUB UR QL STRIP.AUTO: NEGATIVE
BUN SERPL-MCNC: 7 MG/DL (ref 8–22)
CALCIUM ALBUM COR SERPL-MCNC: 8.8 MG/DL (ref 8.5–10.5)
CALCIUM SERPL-MCNC: 8 MG/DL (ref 8.5–10.5)
CHLORIDE SERPL-SCNC: 105 MMOL/L (ref 96–112)
CO2 SERPL-SCNC: 21 MMOL/L (ref 20–33)
COLOR UR: YELLOW
CREAT SERPL-MCNC: 0.57 MG/DL (ref 0.5–1.4)
EOSINOPHIL # BLD AUTO: 0.14 K/UL (ref 0–0.51)
EOSINOPHIL NFR BLD: 2.9 % (ref 0–6.9)
EPI CELLS #/AREA URNS HPF: NEGATIVE /HPF
ERYTHROCYTE [DISTWIDTH] IN BLOOD BY AUTOMATED COUNT: 44.8 FL (ref 35.9–50)
GFR SERPLBLD CREATININE-BSD FMLA CKD-EPI: 104 ML/MIN/1.73 M 2
GLOBULIN SER CALC-MCNC: 2.4 G/DL (ref 1.9–3.5)
GLUCOSE SERPL-MCNC: 93 MG/DL (ref 65–99)
GLUCOSE UR STRIP.AUTO-MCNC: NEGATIVE MG/DL
HCT VFR BLD AUTO: 36.3 % (ref 42–52)
HGB BLD-MCNC: 12.2 G/DL (ref 14–18)
HYALINE CASTS #/AREA URNS LPF: ABNORMAL /LPF
IMM GRANULOCYTES # BLD AUTO: 0.01 K/UL (ref 0–0.11)
IMM GRANULOCYTES NFR BLD AUTO: 0.2 % (ref 0–0.9)
KETONES UR STRIP.AUTO-MCNC: 80 MG/DL
LEUKOCYTE ESTERASE UR QL STRIP.AUTO: NEGATIVE
LYMPHOCYTES # BLD AUTO: 0.79 K/UL (ref 1–4.8)
LYMPHOCYTES NFR BLD: 16.6 % (ref 22–41)
MCH RBC QN AUTO: 35.6 PG (ref 27–33)
MCHC RBC AUTO-ENTMCNC: 33.6 G/DL (ref 32.3–36.5)
MCV RBC AUTO: 105.8 FL (ref 81.4–97.8)
MICRO URNS: ABNORMAL
MONOCYTES # BLD AUTO: 1.03 K/UL (ref 0–0.85)
MONOCYTES NFR BLD AUTO: 21.6 % (ref 0–13.4)
NEUTROPHILS # BLD AUTO: 2.77 K/UL (ref 1.82–7.42)
NEUTROPHILS NFR BLD: 58.1 % (ref 44–72)
NITRITE UR QL STRIP.AUTO: NEGATIVE
NRBC # BLD AUTO: 0 K/UL
NRBC BLD-RTO: 0 /100 WBC (ref 0–0.2)
PH UR STRIP.AUTO: 5.5 [PH] (ref 5–8)
PLATELET # BLD AUTO: 115 K/UL (ref 164–446)
PMV BLD AUTO: 10.3 FL (ref 9–12.9)
POTASSIUM SERPL-SCNC: 3.6 MMOL/L (ref 3.6–5.5)
PROT SERPL-MCNC: 5.4 G/DL (ref 6–8.2)
PROT UR QL STRIP: NEGATIVE MG/DL
RBC # BLD AUTO: 3.43 M/UL (ref 4.7–6.1)
RBC # URNS HPF: ABNORMAL /HPF
RBC UR QL AUTO: ABNORMAL
SODIUM SERPL-SCNC: 138 MMOL/L (ref 135–145)
SP GR UR STRIP.AUTO: 1.02
UROBILINOGEN UR STRIP.AUTO-MCNC: 1 MG/DL
WBC # BLD AUTO: 4.8 K/UL (ref 4.8–10.8)
WBC #/AREA URNS HPF: ABNORMAL /HPF

## 2023-10-08 PROCEDURE — 87186 SC STD MICRODIL/AGAR DIL: CPT

## 2023-10-08 PROCEDURE — A9270 NON-COVERED ITEM OR SERVICE: HCPCS | Performed by: STUDENT IN AN ORGANIZED HEALTH CARE EDUCATION/TRAINING PROGRAM

## 2023-10-08 PROCEDURE — 700111 HCHG RX REV CODE 636 W/ 250 OVERRIDE (IP): Performed by: STUDENT IN AN ORGANIZED HEALTH CARE EDUCATION/TRAINING PROGRAM

## 2023-10-08 PROCEDURE — 700102 HCHG RX REV CODE 250 W/ 637 OVERRIDE(OP)

## 2023-10-08 PROCEDURE — 99232 SBSQ HOSP IP/OBS MODERATE 35: CPT | Mod: GC | Performed by: STUDENT IN AN ORGANIZED HEALTH CARE EDUCATION/TRAINING PROGRAM

## 2023-10-08 PROCEDURE — 700105 HCHG RX REV CODE 258

## 2023-10-08 PROCEDURE — 97164 PT RE-EVAL EST PLAN CARE: CPT

## 2023-10-08 PROCEDURE — 87086 URINE CULTURE/COLONY COUNT: CPT

## 2023-10-08 PROCEDURE — 97535 SELF CARE MNGMENT TRAINING: CPT

## 2023-10-08 PROCEDURE — 87077 CULTURE AEROBIC IDENTIFY: CPT

## 2023-10-08 PROCEDURE — A9270 NON-COVERED ITEM OR SERVICE: HCPCS | Performed by: BEHAVIOR ANALYST

## 2023-10-08 PROCEDURE — 700105 HCHG RX REV CODE 258: Performed by: STUDENT IN AN ORGANIZED HEALTH CARE EDUCATION/TRAINING PROGRAM

## 2023-10-08 PROCEDURE — 81001 URINALYSIS AUTO W/SCOPE: CPT

## 2023-10-08 PROCEDURE — A9270 NON-COVERED ITEM OR SERVICE: HCPCS

## 2023-10-08 PROCEDURE — 51798 US URINE CAPACITY MEASURE: CPT

## 2023-10-08 PROCEDURE — 700111 HCHG RX REV CODE 636 W/ 250 OVERRIDE (IP)

## 2023-10-08 PROCEDURE — 700102 HCHG RX REV CODE 250 W/ 637 OVERRIDE(OP): Performed by: BEHAVIOR ANALYST

## 2023-10-08 PROCEDURE — 85025 COMPLETE CBC W/AUTO DIFF WBC: CPT

## 2023-10-08 PROCEDURE — A9270 NON-COVERED ITEM OR SERVICE: HCPCS | Performed by: EMERGENCY MEDICINE

## 2023-10-08 PROCEDURE — 770020 HCHG ROOM/CARE - TELE (206)

## 2023-10-08 PROCEDURE — 700102 HCHG RX REV CODE 250 W/ 637 OVERRIDE(OP): Performed by: EMERGENCY MEDICINE

## 2023-10-08 PROCEDURE — 700102 HCHG RX REV CODE 250 W/ 637 OVERRIDE(OP): Performed by: STUDENT IN AN ORGANIZED HEALTH CARE EDUCATION/TRAINING PROGRAM

## 2023-10-08 PROCEDURE — 80053 COMPREHEN METABOLIC PANEL: CPT

## 2023-10-08 RX ADMIN — MICONAZOLE NITRATE: 20 CREAM TOPICAL at 05:02

## 2023-10-08 RX ADMIN — CHLORDIAZEPOXIDE HYDROCHLORIDE 25 MG: 25 CAPSULE ORAL at 04:11

## 2023-10-08 RX ADMIN — LORAZEPAM 1 MG: 1 TABLET ORAL at 20:44

## 2023-10-08 RX ADMIN — LORAZEPAM 2 MG: 2 TABLET ORAL at 04:10

## 2023-10-08 RX ADMIN — THERA TABS 1 TABLET: TAB at 06:00

## 2023-10-08 RX ADMIN — LOSARTAN POTASSIUM 50 MG: 50 TABLET, FILM COATED ORAL at 04:11

## 2023-10-08 RX ADMIN — ENOXAPARIN SODIUM 40 MG: 100 INJECTION SUBCUTANEOUS at 17:23

## 2023-10-08 RX ADMIN — LORAZEPAM 1 MG: 1 TABLET ORAL at 06:30

## 2023-10-08 RX ADMIN — FOLIC ACID 1 MG: 1 TABLET ORAL at 04:12

## 2023-10-08 RX ADMIN — POLYMYXIN B SULFATE AND TRIMETHOPRIM 1 DROP: 10000; 1 SOLUTION OPHTHALMIC at 14:00

## 2023-10-08 RX ADMIN — SODIUM CHLORIDE: 9 INJECTION, SOLUTION INTRAVENOUS at 15:54

## 2023-10-08 RX ADMIN — POLYMYXIN B SULFATE AND TRIMETHOPRIM 1 DROP: 10000; 1 SOLUTION OPHTHALMIC at 17:11

## 2023-10-08 RX ADMIN — CHLORDIAZEPOXIDE HYDROCHLORIDE 25 MG: 25 CAPSULE ORAL at 00:18

## 2023-10-08 RX ADMIN — SODIUM CHLORIDE: 9 INJECTION, SOLUTION INTRAVENOUS at 20:00

## 2023-10-08 RX ADMIN — SENNOSIDES AND DOCUSATE SODIUM 2 TABLET: 50; 8.6 TABLET ORAL at 04:11

## 2023-10-08 RX ADMIN — POLYMYXIN B SULFATE AND TRIMETHOPRIM 1 DROP: 10000; 1 SOLUTION OPHTHALMIC at 10:48

## 2023-10-08 RX ADMIN — DULOXETINE HYDROCHLORIDE 30 MG: 30 CAPSULE, DELAYED RELEASE ORAL at 04:12

## 2023-10-08 RX ADMIN — SODIUM CHLORIDE: 9 INJECTION, SOLUTION INTRAVENOUS at 06:23

## 2023-10-08 RX ADMIN — THIAMINE HYDROCHLORIDE 200 MG: 100 INJECTION, SOLUTION INTRAMUSCULAR; INTRAVENOUS at 20:48

## 2023-10-08 RX ADMIN — LORAZEPAM 1.5 MG: 2 INJECTION INTRAMUSCULAR; INTRAVENOUS at 15:40

## 2023-10-08 RX ADMIN — MICONAZOLE NITRATE: 20 CREAM TOPICAL at 00:19

## 2023-10-08 RX ADMIN — POLYMYXIN B SULFATE AND TRIMETHOPRIM 1 DROP: 10000; 1 SOLUTION OPHTHALMIC at 20:50

## 2023-10-08 RX ADMIN — DOXAZOSIN 4 MG: 2 TABLET ORAL at 20:44

## 2023-10-08 RX ADMIN — LORAZEPAM 1 MG: 1 TABLET ORAL at 02:13

## 2023-10-08 RX ADMIN — ROSUVASTATIN CALCIUM 20 MG: 20 TABLET, FILM COATED ORAL at 20:44

## 2023-10-08 RX ADMIN — MICONAZOLE NITRATE: 20 CREAM TOPICAL at 17:16

## 2023-10-08 RX ADMIN — THIAMINE HYDROCHLORIDE 200 MG: 100 INJECTION, SOLUTION INTRAMUSCULAR; INTRAVENOUS at 14:01

## 2023-10-08 RX ADMIN — THIAMINE HYDROCHLORIDE 200 MG: 100 INJECTION, SOLUTION INTRAMUSCULAR; INTRAVENOUS at 10:48

## 2023-10-08 ASSESSMENT — LIFESTYLE VARIABLES
TREMOR: MODERATE TREMOR WITH ARMS EXTENDED
TOTAL SCORE: 8
VISUAL DISTURBANCES: VERY MILD SENSITIVITY
ANXIETY: NO ANXIETY (AT EASE)
AUDITORY DISTURBANCES: NOT PRESENT
PAROXYSMAL SWEATS: NO SWEAT VISIBLE
ORIENTATION AND CLOUDING OF SENSORIUM: DATE DISORIENTATION BY MORE THAN TWO CALENDAR DAYS
VISUAL DISTURBANCES: NOT PRESENT
ANXIETY: NO ANXIETY (AT EASE)
TOTAL SCORE: 9
NAUSEA AND VOMITING: NO NAUSEA AND NO VOMITING
ORIENTATION AND CLOUDING OF SENSORIUM: DATE DISORIENTATION BY NO MORE THAN TWO CALENDAR DAYS
ORIENTATION AND CLOUDING OF SENSORIUM: DATE DISORIENTATION BY MORE THAN TWO CALENDAR DAYS
ORIENTATION AND CLOUDING OF SENSORIUM: DATE DISORIENTATION BY NO MORE THAN TWO CALENDAR DAYS
TREMOR: MODERATE TREMOR WITH ARMS EXTENDED
ORIENTATION AND CLOUDING OF SENSORIUM: DATE DISORIENTATION BY NO MORE THAN TWO CALENDAR DAYS
NAUSEA AND VOMITING: NO NAUSEA AND NO VOMITING
AUDITORY DISTURBANCES: NOT PRESENT
ANXIETY: *
ORIENTATION AND CLOUDING OF SENSORIUM: DISORIENTED FOR PLACE AND / OR PERSON
ANXIETY: *
NAUSEA AND VOMITING: NO NAUSEA AND NO VOMITING
HEADACHE, FULLNESS IN HEAD: NOT PRESENT
VISUAL DISTURBANCES: MODERATELY SEVERE HALLUCINATIONS
HEADACHE, FULLNESS IN HEAD: NOT PRESENT
TOTAL SCORE: 4
PAROXYSMAL SWEATS: NO SWEAT VISIBLE
TREMOR: MODERATE TREMOR WITH ARMS EXTENDED
ANXIETY: NO ANXIETY (AT EASE)
PAROXYSMAL SWEATS: NO SWEAT VISIBLE
HEADACHE, FULLNESS IN HEAD: NOT PRESENT
TOTAL SCORE: 7
VISUAL DISTURBANCES: VERY MILD SENSITIVITY
ANXIETY: MILDLY ANXIOUS
PAROXYSMAL SWEATS: BARELY PERCEPTIBLE SWEATING. PALMS MOIST
ANXIETY: NO ANXIETY (AT EASE)
NAUSEA AND VOMITING: NO NAUSEA AND NO VOMITING
AUDITORY DISTURBANCES: NOT PRESENT
VISUAL DISTURBANCES: NOT PRESENT
VISUAL DISTURBANCES: NOT PRESENT
NAUSEA AND VOMITING: NO NAUSEA AND NO VOMITING
AGITATION: SOMEWHAT MORE THAN NORMAL ACTIVITY
ORIENTATION AND CLOUDING OF SENSORIUM: DATE DISORIENTATION BY MORE THAN TWO CALENDAR DAYS
AGITATION: SOMEWHAT MORE THAN NORMAL ACTIVITY
AUDITORY DISTURBANCES: NOT PRESENT
VISUAL DISTURBANCES: NOT PRESENT
TREMOR: MODERATE TREMOR WITH ARMS EXTENDED
HEADACHE, FULLNESS IN HEAD: NOT PRESENT
TREMOR: MODERATE TREMOR WITH ARMS EXTENDED
TOTAL SCORE: MILD ITCHING, PINS AND NEEDLES SENSATION, BURNING OR NUMBNESS
VISUAL DISTURBANCES: NOT PRESENT
PAROXYSMAL SWEATS: BARELY PERCEPTIBLE SWEATING. PALMS MOIST
VISUAL DISTURBANCES: MODERATE SENSITIVITY
NAUSEA AND VOMITING: NO NAUSEA AND NO VOMITING
TREMOR: NO TREMOR
ANXIETY: NO ANXIETY (AT EASE)
AGITATION: NORMAL ACTIVITY
NAUSEA AND VOMITING: NO NAUSEA AND NO VOMITING
NAUSEA AND VOMITING: NO NAUSEA AND NO VOMITING
AGITATION: SOMEWHAT MORE THAN NORMAL ACTIVITY
AUDITORY DISTURBANCES: VERY MILD HARSHNESS OR ABILITY TO FRIGHTEN
HEADACHE, FULLNESS IN HEAD: NOT PRESENT
AGITATION: *
AGITATION: SOMEWHAT MORE THAN NORMAL ACTIVITY
AGITATION: *
PAROXYSMAL SWEATS: NO SWEAT VISIBLE
HEADACHE, FULLNESS IN HEAD: NOT PRESENT
HEADACHE, FULLNESS IN HEAD: NOT PRESENT
AGITATION: SOMEWHAT MORE THAN NORMAL ACTIVITY
PAROXYSMAL SWEATS: NO SWEAT VISIBLE
PAROXYSMAL SWEATS: NO SWEAT VISIBLE
TOTAL SCORE: 18
TREMOR: NO TREMOR
ORIENTATION AND CLOUDING OF SENSORIUM: DATE DISORIENTATION BY MORE THAN TWO CALENDAR DAYS
AUDITORY DISTURBANCES: VERY MILD HARSHNESS OR ABILITY TO FRIGHTEN
AGITATION: NORMAL ACTIVITY
PAROXYSMAL SWEATS: BARELY PERCEPTIBLE SWEATING. PALMS MOIST
AUDITORY DISTURBANCES: MILD HARSHNESS OR ABILITY TO FRIGHTEN
TOTAL SCORE: 11
TOTAL SCORE: 8
ANXIETY: MILDLY ANXIOUS
AUDITORY DISTURBANCES: NOT PRESENT
TOTAL SCORE: 12
TREMOR: TREMOR NOT VISIBLE BUT CAN BE FELT, FINGERTIP TO FINGERTIP
HEADACHE, FULLNESS IN HEAD: NOT PRESENT
AUDITORY DISTURBANCES: NOT PRESENT
NAUSEA AND VOMITING: NO NAUSEA AND NO VOMITING
TOTAL SCORE: 9
ORIENTATION AND CLOUDING OF SENSORIUM: DATE DISORIENTATION BY NO MORE THAN TWO CALENDAR DAYS
TREMOR: NO TREMOR
HEADACHE, FULLNESS IN HEAD: NOT PRESENT

## 2023-10-08 ASSESSMENT — COGNITIVE AND FUNCTIONAL STATUS - GENERAL
SUGGESTED CMS G CODE MODIFIER MOBILITY: CN
WALKING IN HOSPITAL ROOM: TOTAL
MOVING FROM LYING ON BACK TO SITTING ON SIDE OF FLAT BED: UNABLE
MOVING TO AND FROM BED TO CHAIR: UNABLE
STANDING UP FROM CHAIR USING ARMS: TOTAL
CLIMB 3 TO 5 STEPS WITH RAILING: TOTAL
TURNING FROM BACK TO SIDE WHILE IN FLAT BAD: UNABLE
MOBILITY SCORE: 6

## 2023-10-08 ASSESSMENT — PAIN DESCRIPTION - PAIN TYPE
TYPE: ACUTE PAIN

## 2023-10-08 ASSESSMENT — FIBROSIS 4 INDEX: FIB4 SCORE: 2.34

## 2023-10-08 ASSESSMENT — GAIT ASSESSMENTS: GAIT LEVEL OF ASSIST: UNABLE TO PARTICIPATE

## 2023-10-08 NOTE — CARE PLAN
The patient is Stable - Low risk of patient condition declining or worsening    Shift Goals  Clinical Goals: CIWA, safety, nutrition, monitor output  Patient Goals: rest  Family Goals: n/a    Progress made toward(s) clinical / shift goals:    Problem: Optimal Care for Alcohol Withdrawal  Goal: Optimal Care for the alcohol withdrawal patient  Outcome: Progressing     Problem: Seizure Precautions  Goal: Implementation of seizure precautions  Outcome: Progressing  Note: Seizure precautions in place.     Problem: Risk for Aspiration  Goal: Patient's risk for aspiration will be absent or decrease  Outcome: Progressing  Note: Precautions in placed to prevent aspiration.      Problem: Fall Risk  Goal: Patient will remain free from falls  Outcome: Progressing  Note: Pt has remained free from falls throughout shift.      Problem: Knowledge Deficit - Standard  Goal: Patient and family/care givers will demonstrate understanding of plan of care, disease process/condition, diagnostic tests and medications  Outcome: Progressing     Problem: Skin Integrity  Goal: Skin integrity is maintained or improved  Outcome: Progressing  Note: Pt skin has maintained integrity throughout the shift.

## 2023-10-08 NOTE — PROGRESS NOTES
Assumed care at 0700. Bedside report received from Britany. Patient's chart and MAR reviewed. Pt denies pain at this time. Pt is A & O 3, unsure of month or day, but does know it is 2023. Patient was updated on plan of care for the day. Questions answered and concerns addressed.  Pt denies any additional needs at this time. White board updated. Call light, phone and personal belongings within reach. Pt making unsafe attempts to get out of bed. Pt has tele sitter in room. 4 bed rails put up and lap belt applied. However, pt unable to remove lap belt himself and physicians determined he was inappropriate candidate for restraint. Lap belt removed and 3 bed rails remain up. 1:1 sitter ordered per Dr. Bradshaw.

## 2023-10-08 NOTE — THERAPY
Physical Therapy   Re-Evaluation     Patient Name: Balwinder Grimes  Age:  72 y.o., Sex:  male  Medical Record #: [id number]  Today's Date: 10/8/2023     Precautions  Precautions: Fall Risk    Assessment  Patient is 72 y.o. male seen per request of Resident MD for re-evaluation presenting for syncope and collapse, and experiencing symptoms of ETOH withdrawal since admission.  His wife and son were present in the room throughout, and provided edu re: progression of functional mobility in the setting of ETOH withdrawal, along w/ risks of prolonged immobilization w/ good understanding verbalized by each.  The pt currently presents very lethargic w/ arousal that waxes/wanes, and limited command following.  He displays reduced BLE strength and coordination, and significant difficulty sequencing movements and balancing while seated and standing. He requires modA for supine<>sit EOB w/ HOB elevated and UE support for leverage, along w/ modA for extension/stability during STS w/ FWW.  Pt is currently unable to maintain seated balance for functional periods of time w/o external support, and also requires extensive support to maintain static standing balance w/ FWW demonstrating very limited ability to weight shift despite maxA and heavy cues.  Recommend placement at this time given pt's high risk of future falls and inability to care for self, however anticipate pt will progress to home w/ HH in 2-3 visits w/ further improvement in ETOH withdrawal symptoms. Will follow for acute PT needs.    Plan    Physical Therapy Initial Treatment Plan   Treatment Plan : Bed Mobility, Gait Training, Neuro Re-Education / Balance, Self Care / Home Evaluation, Stair Training, Therapeutic Activities, Therapeutic Exercise  Treatment Frequency: 4 Times per Week  Duration: Until Therapy Goals Met    DC Equipment Recommendations: Unable to determine at this time  Discharge Recommendations: Recommend post-acute placement for additional physical  therapy services prior to discharge home (however anticipate pt will progress to home w/ HH in 2-3 visits w/ further improvement in ETOH withdrawal symptoms.)       Subjective/Objective       10/08/23 1123   Prior Living Situation   Prior Services Home-Independent   Housing / Facility 1 Story House   Steps Into Home 2   Steps In Home 0   Equipment Owned None   Lives with - Patient's Self Care Capacity Spouse   Comments pt lives w/ his wife (present throughout), who is limited able to assist d/t her own C/S injury (currently wearing collar)   Prior Level of Functional Mobility   Bed Mobility Independent   Transfer Status Independent   Ambulation Independent   Ambulation Distance Community distances   Assistive Devices Used None   Stairs Independent   History of Falls   History of Falls Yes   Date of Last Fall   (Reason for admit (d/t syncope))   Cognition    Cognition / Consciousness X   Level of Consciousness Responds to voice   Safety Awareness Impaired;Impulsive   New Learning Impaired   Attention Impaired   Comments pt extremely lethargic to begin session, requiring extensive cueing to maintain eyes open, however improved when sitting upright.  Arousal waxes/wanes while sitting upright, and difficulty following commands.   Passive ROM Lower Body   Passive ROM Lower Body WDL   Active ROM Lower Body    Active ROM Lower Body  WDL   Comments observed during functional mobility   Strength Lower Body   Lower Body Strength  X   Comments Gross BLE weakness   Sensation Lower Body   Lower Extremity Sensation   Not Tested   Coordination Lower Body    Coordination Lower Body  X   Toe Tapping Right Impaired   Toe Tapping Left Impaired   Other Treatments   Other Treatments Provided Provided pt's wife and son lokesh re: progression of functional mobility during ETOH withdrawal, along w/ risks of prolonged immobility   Balance Assessment   Sitting Balance (Static) Poor +   Sitting Balance (Dynamic) Poor   Standing Balance (Static)  Poor -   Standing Balance (Dynamic) Trace +   Weight Shift Sitting Poor   Weight Shift Standing Poor   Comments stand w/ FWW   Bed Mobility    Supine to Sit Moderate Assist   Sit to Supine Moderate Assist   Scooting Moderate Assist   Comments HOB elevated, UE support for leverage   Gait Analysis   Gait Level Of Assist Unable to Participate   Weight Bearing Status no restrictions   Comments pt unable to adequately weight shift for LLE advancement despite maxA and heavy cues   Functional Mobility   Sit to Stand Moderate Assist   Bed, Chair, Wheelchair Transfer Unable to Participate   Mobility STS EOB w/ FWW   Activity Tolerance   Sitting Edge of Bed 15min   Standing 2min   Edema / Skin Assessment   Edema / Skin  Not Assessed   Short Term Goals    Short Term Goal # 1 Pt will perform STS/functional transfers with FWW and SPV in 6 visits to progress bed mobility   Short Term Goal # 2 Pt will ambualte at least 200 ft with FWW and SPV in 6 visits to progress functional independence   Short Term Goal # 3 Pt will negotiate 2 stairs with SPV in 6 visits to safely access home   Short Term Goal # 4 The pt will demo supine<>sit EOB w/ HOB flat and no rails spv in 6 visits for independence w/ bed mobility.   Education Group   Education Provided Role of Physical Therapist  (Progression of functional mobility in the setting of ETOH withdrawal, and risks of prolonged immobilization.)   Role of Physical Therapist Patient Response Patient;Family;Significant Other;Acceptance;Explanation;Demonstration;Action Demonstration;Verbal Demonstration   Additional Comments Pt's wife and son both very receptive of edu provided   Problem List    Problems Pain;Impaired Bed Mobility;Impaired Transfers;Impaired Ambulation;Functional Strength Deficit;Impaired Balance;Impaired Coordination;Decreased Activity Tolerance;Safety Awareness Deficits / Cognition;Motor Planning / Sequencing   Interdisciplinary Plan of Care Collaboration   IDT Collaboration  with  Nursing;Other (See Comments)  (Resident MD)   Patient Position at End of Therapy In Bed;Bed Alarm On;Call Light within Reach;Tray Table within Reach;Phone within Reach;Family / Friend in Room   Collaboration Comments regarding outcome of tx session   Session Information   Date / Session Number  10/8- 2 (1/4, 10/14)

## 2023-10-08 NOTE — PROGRESS NOTES
[facility] FAMILY MEDICINE PROGRESS NOTE     Attending:   Salo Graff MD    Resident:   Senior: Frankie Leroy MD  Carlitos: Susan Bradshaw MD    PATIENT:   Balwinder Grimes; [id number]; 1950    ID:   72 y.o. male admitted for syncope and CIWA    SUBJECTIVE:   No acute overnight events. Nursing this morning called stating that patient was making unsafe attempts to get out of bed. Order for 4 bed rails up was placed and nursing was told to refrain from restraints until patient could be seen at bedside. At bedside patient stated that he was uncomfortable and lap band restraint was in place. He was AxOx4 to self, location of [facility], year 2023 and that he was here due to his increasing instability with walking. Patient was deemed an inappropriate candidate for restraint and since no order for restraints was placed, lap band was requested to be removed. Per nursing patient was having increased urge to go to the restroom. Repeat bladder scans and PT evaluation was requested. Later in the morning during rounds patient was still having urinary retention and was then found to be AxOx2. Patient continues to be labile in his alertness.     OBJECTIVE:  Vitals:    10/08/23 0600 10/08/23 0737 10/08/23 1227 10/08/23 1522   BP: (!) 146/90 (!) 156/95 (!) 160/99 (!) 143/79   Pulse: (!) 6 97 (!) 110 (!) 103   Resp: 20 19 20 18   Temp:  36.6 °C (97.9 °F) 37 °C (98.6 °F) 37.2 °C (99 °F)   TempSrc:  Temporal Temporal Temporal   SpO2:  94% 90% 91%   Weight:       Height:             Intake/Output Summary (Last 24 hours) at 10/8/2023 1531  Last data filed at 10/8/2023 0100  Gross per 24 hour   Intake --   Output 515 ml   Net -515 ml       PHYSICAL EXAM:   General: No acute distress, afebrile, resting comfortably, conversational   HEENT: NC/AT. EOMI. eyes crusted together with conjunctivitis bilaterally  Cardiovascular: RRR without murmurs, rubs, heaves. Normal capillary refill   Respiratory: CTAB, no tachypnea or retractions   Abdomen:  "normal bowel sounds, soft, nontender, nondistended, no masses, no organomegaly   EXT:  DRIVER, no edema  Skin: No erythema/lesions   Neuro: Non-focal, alert and oriented x4 however labile  essential tremor in bilateral hands present with movement    LABS:  Recent Labs     10/07/23  0844 10/08/23  0051   WBC 5.6 4.8   RBC 3.42* 3.43*   HEMOGLOBIN 12.5* 12.2*   HEMATOCRIT 36.4* 36.3*   .4* 105.8*   MCH 36.5* 35.6*   RDW 45.2 44.8   PLATELETCT 106* 115*   MPV 10.5 10.3   NEUTSPOLYS 65.10 58.10   LYMPHOCYTES 12.60* 16.60*   MONOCYTES 18.50* 21.60*   EOSINOPHILS 3.10 2.90   BASOPHILS 0.50 0.60     Recent Labs     10/06/23  0032 10/07/23  0844 10/08/23  0051   SODIUM 138 136 138   POTASSIUM 3.8 3.6 3.6   CHLORIDE 108 106 105   CO2 17* 20 21   BUN 9 7* 7*   CREATININE 0.58 0.59 0.57   CALCIUM 8.2* 7.9* 8.0*   MAGNESIUM 2.0 1.9  --    PHOSPHORUS 3.4  --   --    ALBUMIN 3.2 2.9* 3.0*     Estimated GFR/CRCL = Estimated Creatinine Clearance: 157.9 mL/min (by C-G formula based on SCr of 0.57 mg/dL).  Recent Labs     10/06/23  0032 10/07/23  0844 10/08/23  0051   GLUCOSE 99 112* 93     Recent Labs     10/06/23  0032 10/07/23  0844 10/08/23  0051   ASTSGOT 28 20 14   ALTSGPT 22 18 14   TBILIRUBIN 1.0 1.2 1.0   ALKPHOSPHAT 92 96 96   GLOBULIN 2.4 2.6 2.4             No results for input(s): \"INR\", \"APTT\", \"FIBRINOGEN\" in the last 72 hours.    Invalid input(s): \"DIMER\"      IMAGING:  DX-CHEST-PORTABLE (1 VIEW)   Final Result      No acute cardiopulmonary abnormality.      CT-CSPINE WITHOUT PLUS RECONS   Final Result      No acute fracture or dislocation of the cervical spine.      CT-HEAD W/O   Final Result      1.  Cerebral atrophy.      2.  White matter lucencies most consistent with small vessel ischemic change versus demyelination or gliosis.      3.  Otherwise, Head CT without contrast with no acute findings. No evidence of acute cerebral infarction, hemorrhage or mass lesion.             MEDS:  Current " Facility-Administered Medications   Medication Last Admin    multivitamin tablet 1 Tablet 1 Tablet at 10/08/23 0600    miconazole (Micotin) 2 % cream Given at 10/08/23 0502    polymixin-trimethoprim (Polytrim) 51939-7.1 UNIT/ML-% ophthalmic solution 1 Drop 1 Drop at 10/08/23 1400    thiamine (B-1) 200 mg in dextrose 5% 100 mL IVPB 200 mg at 10/08/23 1401    labetalol (Normodyne/Trandate) injection 10 mg      folic acid (Folvite) tablet 1 mg 1 mg at 10/08/23 0412    senna-docusate (Pericolace Or Senokot S) 8.6-50 MG per tablet 2 Tablet 2 Tablet at 10/08/23 0411    And    polyethylene glycol/lytes (Miralax) PACKET 1 Packet      And    magnesium hydroxide (Milk Of Magnesia) suspension 30 mL      And    bisacodyl (Dulcolax) suppository 10 mg      NS infusion New Bag at 10/08/23 0623    enoxaparin (Lovenox) inj 40 mg 40 mg at 10/07/23 1738    acetaminophen (Tylenol) tablet 650 mg      ondansetron (Zofran) syringe/vial injection 4 mg      ondansetron (Zofran ODT) dispertab 4 mg      LORazepam (Ativan) tablet 0.5 mg      LORazepam (Ativan) tablet 1 mg 1 mg at 10/08/23 0630    Or    LORazepam (Ativan) injection 0.5 mg      LORazepam (Ativan) tablet 2 mg 2 mg at 10/08/23 0410    Or    LORazepam (Ativan) injection 1 mg 1 mg at 10/07/23 0148    LORazepam (Ativan) tablet 3 mg 3 mg at 10/07/23 0647    Or    LORazepam (Ativan) injection 1.5 mg 1.5 mg at 10/06/23 1523    LORazepam (Ativan) tablet 4 mg      Or    LORazepam (Ativan) injection 2 mg 2 mg at 10/06/23 1619    doxazosin (Cardura) tablet 4 mg 4 mg at 10/07/23 1738    DULoxetine (Cymbalta) capsule 30 mg 30 mg at 10/08/23 0412    losartan (Cozaar) tablet 50 mg 50 mg at 10/08/23 0411    rosuvastatin (Crestor) tablet 20 mg 20 mg at 10/07/23 3729       ASSESSMENT/PLAN:    * Alcohol abuse  Assessment & Plan  #withdrawls  #CIWA  #alcohol withdrawal hallucinations    Pt drinks 1-2 bottles of wine per night. Seems to be related to stress care giving of wife. Patient reports   his  "last drink was five days ago to admitting MD, this AM he reporte last drink 8d prior to admission, but then stated he was drinking with his buddies 2d prior to admission; pt states he is in a \"detox\" program.    CIWA 7-13, automatically gets a 4 for his baseline tremor, required 6 mg of Ativan in 24 hours    Plan:   - CIWA protocol in place, requested that tremor not be included in calculation given essential tremor at baseline  - Patient completed librium taper 10/8/23  - Prior to dc patient is interested in trying naltrexone   - Provide resources for AA at [place]   - Continue multivitamin and thiamine supplement     Fever  Assessment & Plan  Patient with fever of 100.6F  on 10/7/23 at 0407. Patient denied feeling feverish or chills.    -Respiratory panel pending  -UA and urine culture obtained with [facility] placement on 10/8/23 due to flank pain despite being afebrile     Conjunctivitis of both eyes  Assessment & Plan  Bilateral conjunctivitis of the eye, with dried discharge on the eyelids.     - Trimethoprim-polymyxin four times daily for 7 days    Elevated troponin  Assessment & Plan  Pt with troponin at 29 and 31 on admission. EKG unremarkable. Pt with no chest pain. Mildly elevated troponin could be due to low volume status. Patient denies chest pain.  Plan:     - If symptoms arise can get EKG and troponin    Macrocytic anemia  Assessment & Plan  Pt with hemoglobin of 12.7 and MCV at 104.5 on admission; Likely due to heavy alcohol use.     Plan:   - Continue to monitor CBC    - Continue multivitamin and thiamine supplement     Thrombocytopenia (HCC)  Assessment & Plan  Pt with chronically low platelets. This is likely due to heavy alcohol use.   Plan:   - Continue to monitor CBC     Essential tremor  Assessment & Plan  Pt reports hx of essential tremor. Alcohol use helps reduce symptoms. Has not tried medications in the past.   Plan:   - Ordered OT  - Consider medications, possible beta blocker with improving CIWA  "     Hypokalemia  Assessment & Plan  Low on admission, improving    Plan:   - Will replete potassium prn  - Monitor CMP daily     Syncope and collapse- (present on admission)  Assessment & Plan  Pt with lightheadedness and syncope. CT head was unremarkable.  Likely due to orthostatic hypotension and deconditioning. Pt also with chronic alcoholism.     After extensive hx taking (pt poor hx), likely syncope d/t dehydration, hypovolemia, vs intoxication    Plan:   - PT recommend front wheel walker with home health at discharge, will continue to follow  - OT  - Pending orthostatic vitals   - Continue  mL/hr, discontinue if fluid overload   - Will also have nutrition evaluate at patient as intake has been poor     Neuropathy- (present on admission)  Assessment & Plan  Patient reports chronic neuropathy. He denies hx of diabetes however hba1c 3 months ago was 6. Previously on gabapentin with no relief.   Plan:   - HgbA1c 5.6  - PT recommending rehab placement today due to worsening stability on exam, will continue to follow patient     Hyperlipidemia- (present on admission)  Assessment & Plan  Continue home medication of rosuvastatin 20 mg daily     Benign prostatic hyperplasia with urinary retention- (present on admission)  Assessment & Plan  Patient had to be straight cath 2x and continued to have around 300 mL for post void residuals. Maza was placed on 10/8 with at least 300 mL removed.    - Maza in place   - Continue home medication of doxazosin 4 mg     Primary hypertension- (present on admission)  Assessment & Plan  Pt with pmhx of hypertension, takes losartan 50 mg.   Plan:   - Continue home medication   - PRN antihypertension for SBP greater than 180 or DBP greater than 110         Code Status: Full    Dispo: inpt status for further medical management

## 2023-10-08 NOTE — PROGRESS NOTES
Pt has had minimal to no urine output throughout night. 1st bladder scan was 377. Allowed pt time to attempt voiding. Reassessed pt, still no urine. 2nd bladder was 504. Paged out to [facility] at [phone]. Received orders to straight cath from MD Raisa Villa.

## 2023-10-08 NOTE — PROGRESS NOTES
Bedside report received from day RN Kaylie, pt care assumed, assessment completed. Pt is A&Ox3, pain 0/10, SR on the monitor. Updated on POC, questions answered. Bed in lowest, locked position, treaded socks on, call light and belongings within reach. Fall precautions in place.

## 2023-10-09 ENCOUNTER — APPOINTMENT (OUTPATIENT)
Dept: RADIOLOGY | Facility: MEDICAL CENTER | Age: 73
DRG: 896 | End: 2023-10-09
Attending: INTERNAL MEDICINE
Payer: MEDICARE

## 2023-10-09 ENCOUNTER — APPOINTMENT (OUTPATIENT)
Dept: RADIOLOGY | Facility: MEDICAL CENTER | Age: 73
DRG: 896 | End: 2023-10-09
Attending: STUDENT IN AN ORGANIZED HEALTH CARE EDUCATION/TRAINING PROGRAM
Payer: MEDICARE

## 2023-10-09 ENCOUNTER — APPOINTMENT (OUTPATIENT)
Dept: CARDIOLOGY | Facility: MEDICAL CENTER | Age: 73
DRG: 896 | End: 2023-10-09
Attending: INTERNAL MEDICINE
Payer: MEDICARE

## 2023-10-09 PROBLEM — J69.0 ASPIRATION PNEUMONITIS (HCC): Status: ACTIVE | Noted: 2023-10-09

## 2023-10-09 PROBLEM — J96.01 ACUTE RESPIRATORY FAILURE WITH HYPOXIA (HCC): Status: ACTIVE | Noted: 2023-10-09

## 2023-10-09 PROBLEM — F10.931 ALCOHOL WITHDRAWAL SYNDROME, WITH DELIRIUM (HCC): Status: ACTIVE | Noted: 2023-10-04

## 2023-10-09 LAB
ANION GAP SERPL CALC-SCNC: 12 MMOL/L (ref 7–16)
ANION GAP SERPL CALC-SCNC: 14 MMOL/L (ref 7–16)
ARTERIAL PATENCY WRIST A: ABNORMAL
BACTERIA BLD CULT: NORMAL
BACTERIA BLD CULT: NORMAL
BASE EXCESS BLDA CALC-SCNC: -11 MMOL/L (ref -4–3)
BASE EXCESS BLDA CALC-SCNC: -7 MMOL/L (ref -4–3)
BODY TEMPERATURE: 37.1 CENTIGRADE
BODY TEMPERATURE: ABNORMAL DEGREES
BREATHS SETTING VENT: 20
BUN SERPL-MCNC: 5 MG/DL (ref 8–22)
BUN SERPL-MCNC: 6 MG/DL (ref 8–22)
CALCIUM SERPL-MCNC: 7.9 MG/DL (ref 8.5–10.5)
CALCIUM SERPL-MCNC: 8.2 MG/DL (ref 8.5–10.5)
CHLORIDE SERPL-SCNC: 106 MMOL/L (ref 96–112)
CHLORIDE SERPL-SCNC: 107 MMOL/L (ref 96–112)
CO2 BLDA-SCNC: 19 MMOL/L (ref 20–33)
CO2 SERPL-SCNC: 17 MMOL/L (ref 20–33)
CO2 SERPL-SCNC: 19 MMOL/L (ref 20–33)
CREAT SERPL-MCNC: 0.44 MG/DL (ref 0.5–1.4)
CREAT SERPL-MCNC: 0.5 MG/DL (ref 0.5–1.4)
CRP SERPL HS-MCNC: 12.3 MG/DL (ref 0–0.75)
CYTOLOGY REG CYTOL: NORMAL
DELSYS IDSYS: ABNORMAL
EKG IMPRESSION: NORMAL
END TIDAL CARBON DIOXIDE IECO2: 27 MMHG
ERYTHROCYTE [DISTWIDTH] IN BLOOD BY AUTOMATED COUNT: 43 FL (ref 35.9–50)
GFR SERPLBLD CREATININE-BSD FMLA CKD-EPI: 108 ML/MIN/1.73 M 2
GFR SERPLBLD CREATININE-BSD FMLA CKD-EPI: 112 ML/MIN/1.73 M 2
GLUCOSE BLD STRIP.AUTO-MCNC: 109 MG/DL (ref 65–99)
GLUCOSE SERPL-MCNC: 113 MG/DL (ref 65–99)
GLUCOSE SERPL-MCNC: 98 MG/DL (ref 65–99)
GRAM STN SPEC: NORMAL
HCO3 BLDA-SCNC: 17 MMOL/L (ref 17–25)
HCO3 BLDA-SCNC: 18.1 MMOL/L (ref 17–25)
HCT VFR BLD AUTO: 36.3 % (ref 42–52)
HGB BLD-MCNC: 12.6 G/DL (ref 14–18)
HOROWITZ INDEX BLDA+IHG-RTO: 230 MM[HG]
INHALED O2 FLOW RATE: 15 L/MIN (ref 2–10)
INHALED O2 FLOW RATE: ABNORMAL L/MIN
LACTATE BLD-SCNC: 0.7 MMOL/L (ref 0.5–2)
MCH RBC QN AUTO: 35.6 PG (ref 27–33)
MCHC RBC AUTO-ENTMCNC: 34.7 G/DL (ref 32.3–36.5)
MCV RBC AUTO: 102.5 FL (ref 81.4–97.8)
MODE IMODE: ABNORMAL
O2/TOTAL GAS SETTING VFR VENT: 100 %
PCO2 BLDA: 32.9 MMHG (ref 26–37)
PCO2 BLDA: 47.8 MMHG (ref 26–37)
PCO2 TEMP ADJ BLDA: 32.1 MMHG (ref 26–37)
PCO2 TEMP ADJ BLDA: 48 MMHG (ref 26–37)
PEEP END EXPIRATORY PRESSURE IPEEP: 8 CMH20
PH BLDA: 7.18 [PH] (ref 7.4–7.5)
PH BLDA: 7.35 [PH] (ref 7.4–7.5)
PH TEMP ADJ BLDA: 7.18 [PH] (ref 7.4–7.5)
PH TEMP ADJ BLDA: 7.36 [PH] (ref 7.4–7.5)
PLATELET # BLD AUTO: 123 K/UL (ref 164–446)
PMV BLD AUTO: 10.3 FL (ref 9–12.9)
PO2 BLDA: 112.4 MMHG (ref 64–87)
PO2 BLDA: 230 MMHG (ref 64–87)
PO2 TEMP ADJ BLDA: 113 MMHG (ref 64–87)
PO2 TEMP ADJ BLDA: 227 MMHG (ref 64–87)
POTASSIUM SERPL-SCNC: 3.6 MMOL/L (ref 3.6–5.5)
POTASSIUM SERPL-SCNC: 3.9 MMOL/L (ref 3.6–5.5)
PREALB SERPL-MCNC: 6.7 MG/DL (ref 18–38)
PROCALCITONIN SERPL-MCNC: 0.15 NG/ML
RBC # BLD AUTO: 3.54 M/UL (ref 4.7–6.1)
SAO2 % BLDA: 100 % (ref 93–99)
SAO2 % BLDA: 97.3 % (ref 93–99)
SIGNIFICANT IND 70042: NORMAL
SITE SITE: NORMAL
SODIUM SERPL-SCNC: 137 MMOL/L (ref 135–145)
SODIUM SERPL-SCNC: 138 MMOL/L (ref 135–145)
SOURCE SOURCE: NORMAL
SPECIMEN DRAWN FROM PATIENT: ABNORMAL
TIDAL VOLUME IVT: 500 ML
WBC # BLD AUTO: 4.7 K/UL (ref 4.8–10.8)

## 2023-10-09 PROCEDURE — 99232 SBSQ HOSP IP/OBS MODERATE 35: CPT | Mod: GC | Performed by: FAMILY MEDICINE

## 2023-10-09 PROCEDURE — 94799 UNLISTED PULMONARY SVC/PX: CPT

## 2023-10-09 PROCEDURE — 86140 C-REACTIVE PROTEIN: CPT

## 2023-10-09 PROCEDURE — A9270 NON-COVERED ITEM OR SERVICE: HCPCS

## 2023-10-09 PROCEDURE — 85027 COMPLETE CBC AUTOMATED: CPT

## 2023-10-09 PROCEDURE — 84145 PROCALCITONIN (PCT): CPT

## 2023-10-09 PROCEDURE — 31624 DX BRONCHOSCOPE/LAVAGE: CPT | Performed by: INTERNAL MEDICINE

## 2023-10-09 PROCEDURE — 94002 VENT MGMT INPAT INIT DAY: CPT

## 2023-10-09 PROCEDURE — 87070 CULTURE OTHR SPECIMN AEROBIC: CPT

## 2023-10-09 PROCEDURE — 99152 MOD SED SAME PHYS/QHP 5/>YRS: CPT

## 2023-10-09 PROCEDURE — 93010 ELECTROCARDIOGRAM REPORT: CPT | Performed by: INTERNAL MEDICINE

## 2023-10-09 PROCEDURE — 700101 HCHG RX REV CODE 250: Performed by: INTERNAL MEDICINE

## 2023-10-09 PROCEDURE — 307059 PAD,EAR PROTECTOR: Performed by: INTERNAL MEDICINE

## 2023-10-09 PROCEDURE — 700105 HCHG RX REV CODE 258

## 2023-10-09 PROCEDURE — 700101 HCHG RX REV CODE 250

## 2023-10-09 PROCEDURE — 82803 BLOOD GASES ANY COMBINATION: CPT

## 2023-10-09 PROCEDURE — 82962 GLUCOSE BLOOD TEST: CPT

## 2023-10-09 PROCEDURE — 31500 INSERT EMERGENCY AIRWAY: CPT | Mod: GC | Performed by: INTERNAL MEDICINE

## 2023-10-09 PROCEDURE — 0B9M8ZX DRAINAGE OF BILATERAL LUNGS, VIA NATURAL OR ARTIFICIAL OPENING ENDOSCOPIC, DIAGNOSTIC: ICD-10-PCS | Performed by: INTERNAL MEDICINE

## 2023-10-09 PROCEDURE — 700111 HCHG RX REV CODE 636 W/ 250 OVERRIDE (IP): Mod: JZ | Performed by: INTERNAL MEDICINE

## 2023-10-09 PROCEDURE — 700102 HCHG RX REV CODE 250 W/ 637 OVERRIDE(OP): Performed by: INTERNAL MEDICINE

## 2023-10-09 PROCEDURE — 87205 SMEAR GRAM STAIN: CPT

## 2023-10-09 PROCEDURE — 80048 BASIC METABOLIC PNL TOTAL CA: CPT

## 2023-10-09 PROCEDURE — 94003 VENT MGMT INPAT SUBQ DAY: CPT

## 2023-10-09 PROCEDURE — 770022 HCHG ROOM/CARE - ICU (200)

## 2023-10-09 PROCEDURE — 36600 WITHDRAWAL OF ARTERIAL BLOOD: CPT

## 2023-10-09 PROCEDURE — 99291 CRITICAL CARE FIRST HOUR: CPT | Mod: 25 | Performed by: INTERNAL MEDICINE

## 2023-10-09 PROCEDURE — 700111 HCHG RX REV CODE 636 W/ 250 OVERRIDE (IP)

## 2023-10-09 PROCEDURE — 700101 HCHG RX REV CODE 250: Performed by: STUDENT IN AN ORGANIZED HEALTH CARE EDUCATION/TRAINING PROGRAM

## 2023-10-09 PROCEDURE — 700105 HCHG RX REV CODE 258: Performed by: STUDENT IN AN ORGANIZED HEALTH CARE EDUCATION/TRAINING PROGRAM

## 2023-10-09 PROCEDURE — 88112 CYTOPATH CELL ENHANCE TECH: CPT

## 2023-10-09 PROCEDURE — A9270 NON-COVERED ITEM OR SERVICE: HCPCS | Performed by: INTERNAL MEDICINE

## 2023-10-09 PROCEDURE — 83605 ASSAY OF LACTIC ACID: CPT

## 2023-10-09 PROCEDURE — 84134 ASSAY OF PREALBUMIN: CPT

## 2023-10-09 PROCEDURE — 302978 HCHG BRONCHOSCOPY-DIAGNOSTIC

## 2023-10-09 PROCEDURE — 31500 INSERT EMERGENCY AIRWAY: CPT

## 2023-10-09 PROCEDURE — 700105 HCHG RX REV CODE 258: Performed by: INTERNAL MEDICINE

## 2023-10-09 PROCEDURE — 51798 US URINE CAPACITY MEASURE: CPT

## 2023-10-09 PROCEDURE — 5A1945Z RESPIRATORY VENTILATION, 24-96 CONSECUTIVE HOURS: ICD-10-PCS | Performed by: INTERNAL MEDICINE

## 2023-10-09 PROCEDURE — 700111 HCHG RX REV CODE 636 W/ 250 OVERRIDE (IP): Mod: JZ | Performed by: STUDENT IN AN ORGANIZED HEALTH CARE EDUCATION/TRAINING PROGRAM

## 2023-10-09 PROCEDURE — 71045 X-RAY EXAM CHEST 1 VIEW: CPT

## 2023-10-09 PROCEDURE — 700102 HCHG RX REV CODE 250 W/ 637 OVERRIDE(OP)

## 2023-10-09 PROCEDURE — 31645 BRNCHSC W/THER ASPIR 1ST: CPT | Performed by: INTERNAL MEDICINE

## 2023-10-09 PROCEDURE — 93005 ELECTROCARDIOGRAM TRACING: CPT | Performed by: STUDENT IN AN ORGANIZED HEALTH CARE EDUCATION/TRAINING PROGRAM

## 2023-10-09 PROCEDURE — 88305 TISSUE EXAM BY PATHOLOGIST: CPT

## 2023-10-09 PROCEDURE — 31720 CLEARANCE OF AIRWAYS: CPT

## 2023-10-09 PROCEDURE — 87077 CULTURE AEROBIC IDENTIFY: CPT

## 2023-10-09 RX ORDER — POLYETHYLENE GLYCOL 3350 17 G/17G
1 POWDER, FOR SOLUTION ORAL
Status: DISCONTINUED | OUTPATIENT
Start: 2023-10-09 | End: 2023-10-11

## 2023-10-09 RX ORDER — QUETIAPINE FUMARATE 25 MG/1
25 TABLET, FILM COATED ORAL NIGHTLY PRN
Status: DISCONTINUED | OUTPATIENT
Start: 2023-10-09 | End: 2023-10-09

## 2023-10-09 RX ORDER — FLUMAZENIL 0.1 MG/ML
INJECTION INTRAVENOUS
Status: COMPLETED
Start: 2023-10-09 | End: 2023-10-09

## 2023-10-09 RX ORDER — FLUMAZENIL 0.1 MG/ML
0.3 INJECTION INTRAVENOUS ONCE
Status: DISCONTINUED | OUTPATIENT
Start: 2023-10-09 | End: 2023-10-09

## 2023-10-09 RX ORDER — DEXMEDETOMIDINE HYDROCHLORIDE 4 UG/ML
0-.7 INJECTION, SOLUTION INTRAVENOUS CONTINUOUS
Status: DISCONTINUED | OUTPATIENT
Start: 2023-10-09 | End: 2023-10-10

## 2023-10-09 RX ORDER — AMOXICILLIN 250 MG
2 CAPSULE ORAL 2 TIMES DAILY
Status: DISCONTINUED | OUTPATIENT
Start: 2023-10-09 | End: 2023-10-11

## 2023-10-09 RX ORDER — ROSUVASTATIN CALCIUM 10 MG/1
20 TABLET, COATED ORAL EVERY EVENING
Status: DISCONTINUED | OUTPATIENT
Start: 2023-10-09 | End: 2023-10-11

## 2023-10-09 RX ORDER — LOSARTAN POTASSIUM 50 MG/1
50 TABLET ORAL DAILY
Status: DISCONTINUED | OUTPATIENT
Start: 2023-10-10 | End: 2023-10-09

## 2023-10-09 RX ORDER — BISACODYL 10 MG
10 SUPPOSITORY, RECTAL RECTAL
Status: DISCONTINUED | OUTPATIENT
Start: 2023-10-09 | End: 2023-10-11

## 2023-10-09 RX ORDER — DEXMEDETOMIDINE HYDROCHLORIDE 4 UG/ML
.1-1 INJECTION, SOLUTION INTRAVENOUS CONTINUOUS
Status: DISCONTINUED | OUTPATIENT
Start: 2023-10-09 | End: 2023-10-09

## 2023-10-09 RX ORDER — FOLIC ACID 1 MG/1
1 TABLET ORAL DAILY
Status: DISCONTINUED | OUTPATIENT
Start: 2023-10-10 | End: 2023-10-11

## 2023-10-09 RX ORDER — NOREPINEPHRINE BITARTRATE 0.03 MG/ML
0-1 INJECTION, SOLUTION INTRAVENOUS CONTINUOUS
Status: DISCONTINUED | OUTPATIENT
Start: 2023-10-09 | End: 2023-10-11

## 2023-10-09 RX ORDER — QUETIAPINE FUMARATE 25 MG/1
25 TABLET, FILM COATED ORAL NIGHTLY PRN
Status: DISCONTINUED | OUTPATIENT
Start: 2023-10-09 | End: 2023-10-11

## 2023-10-09 RX ORDER — FUROSEMIDE 10 MG/ML
20 INJECTION INTRAMUSCULAR; INTRAVENOUS
Status: DISCONTINUED | OUTPATIENT
Start: 2023-10-09 | End: 2023-10-11

## 2023-10-09 RX ORDER — ETOMIDATE 2 MG/ML
20 INJECTION INTRAVENOUS ONCE
Status: COMPLETED | OUTPATIENT
Start: 2023-10-09 | End: 2023-10-09

## 2023-10-09 RX ORDER — ACETAMINOPHEN 325 MG/1
650 TABLET ORAL EVERY 6 HOURS PRN
Status: DISCONTINUED | OUTPATIENT
Start: 2023-10-09 | End: 2023-10-11

## 2023-10-09 RX ORDER — LINEZOLID 2 MG/ML
600 INJECTION, SOLUTION INTRAVENOUS EVERY 12 HOURS
Status: DISCONTINUED | OUTPATIENT
Start: 2023-10-09 | End: 2023-10-09 | Stop reason: ALTCHOICE

## 2023-10-09 RX ORDER — FLUMAZENIL 0.1 MG/ML
0.2 INJECTION INTRAVENOUS ONCE
Status: COMPLETED | OUTPATIENT
Start: 2023-10-09 | End: 2023-10-09

## 2023-10-09 RX ORDER — ROCURONIUM BROMIDE 10 MG/ML
100 INJECTION, SOLUTION INTRAVENOUS ONCE
Status: COMPLETED | OUTPATIENT
Start: 2023-10-09 | End: 2023-10-09

## 2023-10-09 RX ORDER — DOXAZOSIN 2 MG/1
4 TABLET ORAL EVERY EVENING
Status: DISCONTINUED | OUTPATIENT
Start: 2023-10-09 | End: 2023-10-09

## 2023-10-09 RX ORDER — ONDANSETRON 4 MG/1
4 TABLET, ORALLY DISINTEGRATING ORAL EVERY 4 HOURS PRN
Status: DISCONTINUED | OUTPATIENT
Start: 2023-10-09 | End: 2023-10-11

## 2023-10-09 RX ORDER — POTASSIUM CHLORIDE 20 MEQ/1
60 TABLET, EXTENDED RELEASE ORAL ONCE
Status: DISCONTINUED | OUTPATIENT
Start: 2023-10-09 | End: 2023-10-09

## 2023-10-09 RX ORDER — DULOXETIN HYDROCHLORIDE 30 MG/1
30 CAPSULE, DELAYED RELEASE ORAL DAILY
Status: DISCONTINUED | OUTPATIENT
Start: 2023-10-10 | End: 2023-10-11

## 2023-10-09 RX ORDER — FAMOTIDINE 20 MG/1
20 TABLET, FILM COATED ORAL EVERY 12 HOURS
Status: DISCONTINUED | OUTPATIENT
Start: 2023-10-09 | End: 2023-10-11

## 2023-10-09 RX ORDER — LORAZEPAM 2 MG/ML
1-2 INJECTION INTRAMUSCULAR
Status: DISCONTINUED | OUTPATIENT
Start: 2023-10-09 | End: 2023-10-11

## 2023-10-09 RX ORDER — POTASSIUM CHLORIDE 20 MEQ/1
60 TABLET, EXTENDED RELEASE ORAL ONCE
Status: COMPLETED | OUTPATIENT
Start: 2023-10-09 | End: 2023-10-09

## 2023-10-09 RX ADMIN — POLYMYXIN B SULFATE AND TRIMETHOPRIM 1 DROP: 10000; 1 SOLUTION OPHTHALMIC at 17:06

## 2023-10-09 RX ADMIN — FENTANYL CITRATE 100 MCG: 50 INJECTION, SOLUTION INTRAMUSCULAR; INTRAVENOUS at 19:24

## 2023-10-09 RX ADMIN — FLUMAZENIL 0.2 MG: 0.1 INJECTION INTRAVENOUS at 10:22

## 2023-10-09 RX ADMIN — ETOMIDATE INJECTION 20 MG: 2 SOLUTION INTRAVENOUS at 12:33

## 2023-10-09 RX ADMIN — ROCURONIUM BROMIDE 100 MG: 50 INJECTION, SOLUTION INTRAVENOUS at 12:34

## 2023-10-09 RX ADMIN — FAMOTIDINE 20 MG: 20 TABLET ORAL at 17:05

## 2023-10-09 RX ADMIN — POLYMYXIN B SULFATE AND TRIMETHOPRIM 1 DROP: 10000; 1 SOLUTION OPHTHALMIC at 14:54

## 2023-10-09 RX ADMIN — LORAZEPAM 2 MG: 2 INJECTION INTRAMUSCULAR; INTRAVENOUS at 04:49

## 2023-10-09 RX ADMIN — DEXMEDETOMIDINE 0.2 MCG/KG/HR: 100 INJECTION, SOLUTION INTRAVENOUS at 12:51

## 2023-10-09 RX ADMIN — DOCUSATE SODIUM 50 MG AND SENNOSIDES 8.6 MG 2 TABLET: 8.6; 5 TABLET, FILM COATED ORAL at 17:05

## 2023-10-09 RX ADMIN — POLYMYXIN B SULFATE AND TRIMETHOPRIM 1 DROP: 10000; 1 SOLUTION OPHTHALMIC at 21:22

## 2023-10-09 RX ADMIN — THIAMINE HYDROCHLORIDE 200 MG: 100 INJECTION, SOLUTION INTRAMUSCULAR; INTRAVENOUS at 11:41

## 2023-10-09 RX ADMIN — LORAZEPAM 1.5 MG: 2 INJECTION INTRAMUSCULAR; INTRAVENOUS at 03:43

## 2023-10-09 RX ADMIN — FUROSEMIDE 20 MG: 10 INJECTION, SOLUTION INTRAVENOUS at 11:37

## 2023-10-09 RX ADMIN — FLUMAZENIL 0.2 MG: 0.1 INJECTION, SOLUTION INTRAVENOUS at 10:22

## 2023-10-09 RX ADMIN — LORAZEPAM 1.5 MG: 2 INJECTION INTRAMUSCULAR; INTRAVENOUS at 00:11

## 2023-10-09 RX ADMIN — POTASSIUM CHLORIDE 60 MEQ: 1500 TABLET, EXTENDED RELEASE ORAL at 14:54

## 2023-10-09 RX ADMIN — FUROSEMIDE 20 MG: 10 INJECTION, SOLUTION INTRAVENOUS at 17:05

## 2023-10-09 RX ADMIN — NOREPINEPHRINE BITARTRATE 0.1 MCG/KG/MIN: 1 INJECTION INTRAVENOUS at 13:49

## 2023-10-09 RX ADMIN — LORAZEPAM 1.5 MG: 2 INJECTION INTRAMUSCULAR; INTRAVENOUS at 01:28

## 2023-10-09 RX ADMIN — LORAZEPAM 2 MG: 2 INJECTION INTRAMUSCULAR; INTRAVENOUS at 04:16

## 2023-10-09 RX ADMIN — LORAZEPAM 1.5 MG: 2 INJECTION INTRAMUSCULAR; INTRAVENOUS at 02:07

## 2023-10-09 RX ADMIN — SODIUM CHLORIDE: 9 INJECTION, SOLUTION INTRAVENOUS at 08:39

## 2023-10-09 RX ADMIN — LORAZEPAM 2 MG: 2 INJECTION INTRAMUSCULAR; INTRAVENOUS at 05:10

## 2023-10-09 RX ADMIN — MICONAZOLE NITRATE: 20 CREAM TOPICAL at 17:06

## 2023-10-09 RX ADMIN — LORAZEPAM 1.5 MG: 2 INJECTION INTRAMUSCULAR; INTRAVENOUS at 04:05

## 2023-10-09 RX ADMIN — LORAZEPAM 2 MG: 2 INJECTION INTRAMUSCULAR; INTRAVENOUS at 05:26

## 2023-10-09 RX ADMIN — LORAZEPAM 2 MG: 2 INJECTION INTRAMUSCULAR; INTRAVENOUS at 04:32

## 2023-10-09 RX ADMIN — LORAZEPAM 2 MG: 2 INJECTION INTRAMUSCULAR; INTRAVENOUS at 05:48

## 2023-10-09 RX ADMIN — ROSUVASTATIN 20 MG: 10 TABLET, FILM COATED ORAL at 17:05

## 2023-10-09 RX ADMIN — MICONAZOLE NITRATE: 20 CREAM TOPICAL at 04:16

## 2023-10-09 RX ADMIN — ENOXAPARIN SODIUM 40 MG: 100 INJECTION SUBCUTANEOUS at 17:04

## 2023-10-09 RX ADMIN — FENTANYL CITRATE 100 MCG: 50 INJECTION, SOLUTION INTRAMUSCULAR; INTRAVENOUS at 14:53

## 2023-10-09 ASSESSMENT — LIFESTYLE VARIABLES
TREMOR: *
ANXIETY: *
TOTAL SCORE: VERY MILD ITCHING, PINS AND NEEDLES SENSATION, BURNING OR NUMBNESS
TREMOR: MODERATE TREMOR WITH ARMS EXTENDED
VISUAL DISTURBANCES: MODERATE SENSITIVITY
PAROXYSMAL SWEATS: BARELY PERCEPTIBLE SWEATING. PALMS MOIST
TOTAL SCORE: 35
AUDITORY DISTURBANCES: SEVERE HALLUCINATIONS
ORIENTATION AND CLOUDING OF SENSORIUM: DISORIENTED FOR PLACE AND / OR PERSON
PAROXYSMAL SWEATS: *
TREMOR: *
ANXIETY: *
AGITATION: *
HEADACHE, FULLNESS IN HEAD: NOT PRESENT
TOTAL SCORE: 27
TOTAL SCORE: 27
TOTAL SCORE: VERY MILD ITCHING, PINS AND NEEDLES SENSATION, BURNING OR NUMBNESS
NAUSEA AND VOMITING: NO NAUSEA AND NO VOMITING
TOTAL SCORE: 30
ANXIETY: MODERATELY ANXIOUS OR GUARDED, SO ANXIETY IS INFERRED
PAROXYSMAL SWEATS: BARELY PERCEPTIBLE SWEATING. PALMS MOIST
VISUAL DISTURBANCES: NOT PRESENT
ORIENTATION AND CLOUDING OF SENSORIUM: DISORIENTED FOR PLACE AND / OR PERSON
TOTAL SCORE: 20
HEADACHE, FULLNESS IN HEAD: NOT PRESENT
ORIENTATION AND CLOUDING OF SENSORIUM: DISORIENTED FOR PLACE AND / OR PERSON
TOTAL SCORE: 25
TOTAL SCORE: VERY MILD ITCHING, PINS AND NEEDLES SENSATION, BURNING OR NUMBNESS
AGITATION: *
TOTAL SCORE: VERY MILD ITCHING, PINS AND NEEDLES SENSATION, BURNING OR NUMBNESS
NAUSEA AND VOMITING: NO NAUSEA AND NO VOMITING
TOTAL SCORE: 19
HEADACHE, FULLNESS IN HEAD: NOT PRESENT
TREMOR: MODERATE TREMOR WITH ARMS EXTENDED
HEADACHE, FULLNESS IN HEAD: NOT PRESENT
PAROXYSMAL SWEATS: BARELY PERCEPTIBLE SWEATING. PALMS MOIST
ORIENTATION AND CLOUDING OF SENSORIUM: DISORIENTED FOR PLACE AND / OR PERSON
NAUSEA AND VOMITING: NO NAUSEA AND NO VOMITING
TOTAL SCORE: 21
TREMOR: MODERATE TREMOR WITH ARMS EXTENDED
AUDITORY DISTURBANCES: NOT PRESENT
ORIENTATION AND CLOUDING OF SENSORIUM: DISORIENTED FOR PLACE AND / OR PERSON
AUDITORY DISTURBANCES: VERY MILD HARSHNESS OR ABILITY TO FRIGHTEN
TREMOR: *
TREMOR: *
PAROXYSMAL SWEATS: BARELY PERCEPTIBLE SWEATING. PALMS MOIST
NAUSEA AND VOMITING: NO NAUSEA AND NO VOMITING
VISUAL DISTURBANCES: SEVERE HALLUCINATIONS
TOTAL SCORE: 18
AGITATION: *
VISUAL DISTURBANCES: VERY MILD SENSITIVITY
AGITATION: *
VISUAL DISTURBANCES: NOT PRESENT
AUDITORY DISTURBANCES: NOT PRESENT
HEADACHE, FULLNESS IN HEAD: NOT PRESENT
TOTAL SCORE: MILD ITCHING, PINS AND NEEDLES SENSATION, BURNING OR NUMBNESS
ANXIETY: *
NAUSEA AND VOMITING: NO NAUSEA AND NO VOMITING
ANXIETY: *
TREMOR: MODERATE TREMOR WITH ARMS EXTENDED
TOTAL SCORE: VERY MILD ITCHING, PINS AND NEEDLES SENSATION, BURNING OR NUMBNESS
AUDITORY DISTURBANCES: VERY MILD HARSHNESS OR ABILITY TO FRIGHTEN
PAROXYSMAL SWEATS: BARELY PERCEPTIBLE SWEATING. PALMS MOIST
AUDITORY DISTURBANCES: MILD HARSHNESS OR ABILITY TO FRIGHTEN
PAROXYSMAL SWEATS: BARELY PERCEPTIBLE SWEATING. PALMS MOIST
TOTAL SCORE: VERY MILD ITCHING, PINS AND NEEDLES SENSATION, BURNING OR NUMBNESS
AUDITORY DISTURBANCES: NOT PRESENT
ANXIETY: *
ANXIETY: NO ANXIETY (AT EASE)
VISUAL DISTURBANCES: MILD SENSITIVITY
TREMOR: *
VISUAL DISTURBANCES: NOT PRESENT
VISUAL DISTURBANCES: VERY MILD SENSITIVITY
AGITATION: *
NAUSEA AND VOMITING: NO NAUSEA AND NO VOMITING
AUDITORY DISTURBANCES: MILD HARSHNESS OR ABILITY TO FRIGHTEN
AUDITORY DISTURBANCES: NOT PRESENT
AGITATION: *
AUDITORY DISTURBANCES: NOT PRESENT
ANXIETY: MODERATELY ANXIOUS OR GUARDED, SO ANXIETY IS INFERRED
HEADACHE, FULLNESS IN HEAD: NOT PRESENT
ORIENTATION AND CLOUDING OF SENSORIUM: DISORIENTED FOR PLACE AND / OR PERSON
NAUSEA AND VOMITING: NO NAUSEA AND NO VOMITING
NAUSEA AND VOMITING: NO NAUSEA AND NO VOMITING
AUDITORY DISTURBANCES: NOT PRESENT
AGITATION: *
ANXIETY: *
AGITATION: *
TOTAL SCORE: 5
TREMOR: MODERATE TREMOR WITH ARMS EXTENDED
TOTAL SCORE: VERY MILD ITCHING, PINS AND NEEDLES SENSATION, BURNING OR NUMBNESS
AGITATION: NORMAL ACTIVITY
AGITATION: *
AGITATION: *
HEADACHE, FULLNESS IN HEAD: NOT PRESENT
VISUAL DISTURBANCES: SEVERE HALLUCINATIONS
ORIENTATION AND CLOUDING OF SENSORIUM: DISORIENTED FOR PLACE AND / OR PERSON
NAUSEA AND VOMITING: NO NAUSEA AND NO VOMITING
ORIENTATION AND CLOUDING OF SENSORIUM: DISORIENTED FOR PLACE AND / OR PERSON
PAROXYSMAL SWEATS: BARELY PERCEPTIBLE SWEATING. PALMS MOIST
PAROXYSMAL SWEATS: BARELY PERCEPTIBLE SWEATING. PALMS MOIST
VISUAL DISTURBANCES: SEVERE HALLUCINATIONS
TOTAL SCORE: 26
ORIENTATION AND CLOUDING OF SENSORIUM: DISORIENTED FOR PLACE AND / OR PERSON
ORIENTATION AND CLOUDING OF SENSORIUM: DISORIENTED FOR PLACE AND / OR PERSON
ANXIETY: MODERATELY ANXIOUS OR GUARDED, SO ANXIETY IS INFERRED
HEADACHE, FULLNESS IN HEAD: NOT PRESENT
TOTAL SCORE: VERY MILD ITCHING, PINS AND NEEDLES SENSATION, BURNING OR NUMBNESS
VISUAL DISTURBANCES: SEVERE HALLUCINATIONS
TREMOR: *
AGITATION: *
VISUAL DISTURBANCES: EXTREMELY SEVERE HALLUCINATIONS
ORIENTATION AND CLOUDING OF SENSORIUM: DISORIENTED FOR PLACE AND / OR PERSON
TREMOR: TREMOR NOT VISIBLE BUT CAN BE FELT, FINGERTIP TO FINGERTIP
ORIENTATION AND CLOUDING OF SENSORIUM: DISORIENTED FOR PLACE AND / OR PERSON
NAUSEA AND VOMITING: NO NAUSEA AND NO VOMITING
HEADACHE, FULLNESS IN HEAD: NOT PRESENT
NAUSEA AND VOMITING: NO NAUSEA AND NO VOMITING
NAUSEA AND VOMITING: NO NAUSEA AND NO VOMITING
TOTAL SCORE: 30
PAROXYSMAL SWEATS: NO SWEAT VISIBLE
PAROXYSMAL SWEATS: *
HEADACHE, FULLNESS IN HEAD: NOT PRESENT
ANXIETY: *
HEADACHE, FULLNESS IN HEAD: NOT PRESENT
HEADACHE, FULLNESS IN HEAD: NOT PRESENT
AUDITORY DISTURBANCES: MILD HARSHNESS OR ABILITY TO FRIGHTEN
ANXIETY: *
PAROXYSMAL SWEATS: BARELY PERCEPTIBLE SWEATING. PALMS MOIST

## 2023-10-09 ASSESSMENT — PAIN DESCRIPTION - PAIN TYPE
TYPE: ACUTE PAIN
TYPE: ACUTE PAIN

## 2023-10-09 ASSESSMENT — FIBROSIS 4 INDEX: FIB4 SCORE: 2.19

## 2023-10-09 NOTE — ASSESSMENT & PLAN NOTE
Secondary to alcohol withdrawal syndrome  Blood cultures, sputum culture, procalcitonin  Hold off on antibiotics for now with low threshold to initiate should he develop fevers or worsening respiratory failure  Ongoing aspiration precautions, SLP evaluation

## 2023-10-09 NOTE — PROGRESS NOTES
Dr. Gonda at bedside discussing plan of care with son, Terrell. Per son, Pt's wife Alma has dementia and needs full time care.    Plan to intubate and bronch.   TO 1233 intubation and bronch  20 ETOM 1233   100 AVELINO 1234   Positive color change 1235   ETT secured 25 teeth

## 2023-10-09 NOTE — ASSESSMENT & PLAN NOTE
Admitted for syncope 10/4, likely alcohol related  Echo (10/10/23): EF 55%, no valvular abnormalities, consider infiltrative cardiomyopathy    Continuous telemetry monitoring  Fall precautions  PT/OT

## 2023-10-09 NOTE — PROCEDURES
Procedure Note    Date: 10/9/2023  Time: 1245    Procedure: Bronchoscopy with bronchoalveolar lavage and therapeutic aspiration of secretions from bronchi    Indication: Aspiration pneumonitis, respiratory failure  Consent: Informed consent obtained from patient or designated decision maker after explaining the benefits/risks of the procedure including but not limited to bleeding, infection, airway trauma or loss therof, pneumothorax/hemothorax, arrythmia, or death. Patient or surrogate expressed understanding and agreement.    Time-out: Verbal consent was obtained. Immediately prior to procedure, a time out was called to verify the correct patient, procedure, equipment, support staff and site/side marked as required. Pre-procedure pain reported as n/a and post-procedure was n/a.    Procedure: After obtaining consent, a time-out was performed. Respiratory therapy and nursing at bedside throughout procedure. Patient provided sedation and analgesia throughout the procedure. Placed on full ventilator support with an FiO2 of 100% throughout the procedure. Using a fiberoptic bronchoscope, trachea entered via 8.0 ETT.  0 mL of local anesthetic sprayed at the kit (2% lidocaine) achieving appropriate comfort level for patient. Airways visualized directly and the following intervention was performed: diagnostic and therapeutic lavage with all areas of lungs suctioned to clear. Findings as below. Patient tolerated procedure well without any difficulties and left in care of bedside nurse/RT.     Medications: RSI medications (etomidate + rocuronium)  Findings: Upper airway - Not visualized as bronchoscope passed through ETT.        Trachea to kit - normal appearing mucosa without lesions or mass, ETT tip measured 3 cm from the kit.        R proximal and distal airways - pale appearing mucosa without mass/lesion/anatomic variance, secretions: minimal, clear        L proximal and distal airways - pale appearing mucosa  without mass/lesion/anatomic variance, secretions: minimal, clear        Samples - bilateral LL of bronchi    Complications: none  CXR (if applicable): pending    Jeremy Gonda, MD  Critical Care Medicine

## 2023-10-09 NOTE — ASSESSMENT & PLAN NOTE
Worsening in last 24 hours, admit on 10/6, reported drinks 1-2 bottles of wine  Transferred to ICU for Precedex drip with scheduled and as needed Ativan monitoring RASS  Aspiration and seizure precautions  Continue vitamin supplementation  Eventual alcohol cessation education and resources to be provided when appropriate  Discontinue Ativan today  Delirium precautions

## 2023-10-09 NOTE — PROGRESS NOTES
Bedside report received from MILTON Cordova. Pt on 1 L O2 NC. Patient A&O x 3, disoriented to location. Pt does not complain of pain at this time. POC discussed with patient. Patient verbalizes understanding. Call light and belongings within reach. Bed locked in lowest position, alarm and fall precautions in place.

## 2023-10-09 NOTE — PROGRESS NOTES
Received a call from [facility]  at 1016 that pts O2 sat was 74%. Went in to pt room where another RN was attempting to arouse pt, he was put on an oxy mask at 15 L  o2 sat was 65%, change to non rebreather RR called. Pt o2 sat increased to 96%

## 2023-10-09 NOTE — PROGRESS NOTES
[facility] MEDICINE PROGRESS NOTE     Attending:   Salo Graff MD    Resident:   Senior: Frankie Leroy MD  Carlitos: Susan Bradshaw MD    PATIENT:   Balwinder Grimes; [id number]; 1950    ID:   72 y.o. male admitted for syncope and CIWA    SUBJECTIVE:   Overnight patient had increasing CIWA scores between 0000 and 0500 ranging from 18-35, during this time he required 19.5 mg of ativan. Nursing was asked to exclude tremors from CIWA scoring due to patient having a baseline essential tremor and order was placed to reduce likelihood of overmedicating patient, however overnight scoring of tremor was included. Patient this morning was seen at bedside and was sleeping. When asked to wake up patient grunted and would not open his eyes. He was moving all extremities. At 1024 [facility]  team was notified that a rapid was called, please see Dr. Leroy's Rapid Note for more information.     OBJECTIVE:  Vitals:    10/09/23 1130 10/09/23 1131 10/09/23 1233 10/09/23 1237   BP:       Pulse: (!) 104 (!) 105 94 97   Resp: (!) 35 (!) 36 (!) 40 20   Temp:       TempSrc:       SpO2: 99% 98% 98% 98%   Weight:       Height:             Intake/Output Summary (Last 24 hours) at 10/9/2023 1335  Last data filed at 10/9/2023 1300  Gross per 24 hour   Intake 301.23 ml   Output 1500 ml   Net -1198.77 ml       PHYSICAL EXAM:   General: No acute distress, afebrile, resting comfortably, conversational   HEENT: NC/AT. EOMI. eyes crusted together with conjunctivitis bilaterally  Cardiovascular: RRR without murmurs, rubs, heaves. Normal capillary refill   Respiratory: CTAB, no tachypnea or retractions, patient snoring when falls asleep    Abdomen: normal bowel sounds, soft, nontender, nondistended, no masses, no organomegaly   EXT:  DRIVER, no edema  Skin: No erythema/lesions   Neuro: Non-focal,  somnolent,  essential tremor in bilateral hands present with movement    LABS:  Recent Labs     10/07/23  0844 10/08/23  0051 10/09/23  0031   WBC 5.6 4.8 4.7*  "  RBC 3.42* 3.43* 3.54*   HEMOGLOBIN 12.5* 12.2* 12.6*   HEMATOCRIT 36.4* 36.3* 36.3*   .4* 105.8* 102.5*   MCH 36.5* 35.6* 35.6*   RDW 45.2 44.8 43.0   PLATELETCT 106* 115* 123*   MPV 10.5 10.3 10.3   NEUTSPOLYS 65.10 58.10  --    LYMPHOCYTES 12.60* 16.60*  --    MONOCYTES 18.50* 21.60*  --    EOSINOPHILS 3.10 2.90  --    BASOPHILS 0.50 0.60  --      Recent Labs     10/07/23  0844 10/08/23  0051 10/09/23  0031 10/09/23  1048   SODIUM 136 138 137 138   POTASSIUM 3.6 3.6 3.6 3.9   CHLORIDE 106 105 106 107   CO2 20 21 19* 17*   BUN 7* 7* 6* 5*   CREATININE 0.59 0.57 0.44* 0.50   CALCIUM 7.9* 8.0* 7.9* 8.2*   MAGNESIUM 1.9  --   --   --    ALBUMIN 2.9* 3.0*  --   --      Estimated GFR/CRCL = Estimated Creatinine Clearance: 168.7 mL/min (by C-G formula based on SCr of 0.5 mg/dL).  Recent Labs     10/08/23  0051 10/09/23  0031 10/09/23  1048   GLUCOSE 93 98 113*     Recent Labs     10/07/23  0844 10/08/23  0051   ASTSGOT 20 14   ALTSGPT 18 14   TBILIRUBIN 1.2 1.0   ALKPHOSPHAT 96 96   GLOBULIN 2.6 2.4         Recent Labs     10/09/23  1048   SOLIA63G 7.18*   TGLKLT097M 47.8*   NXPTO899S 112.4*   PUKF5FRL 97.3   ARTHCO3 17   X0ZAMKCAV 15L  15.0*   ARTBE -11*     No results for input(s): \"INR\", \"APTT\", \"FIBRINOGEN\" in the last 72 hours.    Invalid input(s): \"DIMER\"      IMAGING:  DX-ABDOMEN FOR TUBE PLACEMENT   Final Result         Gastric drainage tube coiled in the stomach with tip projecting over the expected area of the distal stomach.      DX-CHEST-PORTABLE (1 VIEW)   Final Result      1.  Likely layering bilateral pleural effusions.   2.  Interstitial infiltrates and/or edema.   3.  Support apparatus as above.      DX-CHEST-PORTABLE (1 VIEW)   Final Result      1.  Pulmonary edema. Multifocal pneumonia could have a similar appearance.   2.  Likely small/moderate bilateral pleural effusions.      DX-CHEST-PORTABLE (1 VIEW)   Final Result      No acute cardiopulmonary abnormality.      CT-CSPINE WITHOUT PLUS " RECONS   Final Result      No acute fracture or dislocation of the cervical spine.      CT-HEAD W/O   Final Result      1.  Cerebral atrophy.      2.  White matter lucencies most consistent with small vessel ischemic change versus demyelination or gliosis.      3.  Otherwise, Head CT without contrast with no acute findings. No evidence of acute cerebral infarction, hemorrhage or mass lesion.         EC-ECHOCARDIOGRAM COMPLETE W/O CONT    (Results Pending)       MEDS:  Current Facility-Administered Medications   Medication Last Admin    Respiratory Therapy Consult      LORazepam (Ativan) injection 1-2 mg      furosemide (Lasix) injection 20 mg 20 mg at 10/09/23 1137    famotidine (Pepcid) tablet 20 mg      Or    famotidine (Pepcid) injection 20 mg      MD Alert...ICU Electrolyte Replacement per Pharmacy      lidocaine (Xylocaine) 1 % injection 2 mL      Pharmacy Consult: Enteral tube insertion - review meds/change route/product selection      fentaNYL (Sublimaze) injection 50 mcg      And    fentaNYL (Sublimaze) injection 100 mcg      And    fentaNYL (SUBLIMAZE) 50 mcg/mL in 50mL (Continuous Infusion)      And    dexmedetomidine (PRECEDEX) 400 mcg/100mL NS premix infusion 0.2 mcg/kg/hr at 10/09/23 1315    [START ON 10/10/2023] folic acid (Folvite) tablet 1 mg      senna-docusate (Pericolace Or Senokot S) 8.6-50 MG per tablet 2 Tablet      And    polyethylene glycol/lytes (Miralax) PACKET 1 Packet      And    magnesium hydroxide (Milk Of Magnesia) suspension 30 mL      And    bisacodyl (Dulcolax) suppository 10 mg      acetaminophen (Tylenol) tablet 650 mg      ondansetron (Zofran ODT) dispertab 4 mg      doxazosin (Cardura) tablet 4 mg      [START ON 10/10/2023] DULoxetine (Cymbalta) capsule 30 mg      [START ON 10/10/2023] losartan (Cozaar) tablet 50 mg      rosuvastatin (Crestor) tablet 20 mg      [START ON 10/10/2023] multivitamin tablet 1 Tablet      QUEtiapine (SEROquel) tablet 25 mg      potassium chloride SA  (Kdur) tablet 60 mEq      miconazole (Micotin) 2 % cream Given at 10/09/23 0416    polymixin-trimethoprim (Polytrim) 17366-0.1 UNIT/ML-% ophthalmic solution 1 Drop 1 Drop at 10/08/23 2050    thiamine (B-1) 200 mg in dextrose 5% 100 mL IVPB 200 mg at 10/09/23 1141    enoxaparin (Lovenox) inj 40 mg 40 mg at 10/08/23 1723    ondansetron (Zofran) syringe/vial injection 4 mg         ASSESSMENT/PLAN:  * Alcohol withdrawal syndrome, with delirium (HCC)  Assessment & Plan  #Somnolent state   #Respiratory Depression   #Alcohol Withdrawal Syndrome with delirium   Patient was given flumazenil by rapid response team for reversal of ativan due to somnolent state, increased oxygen demand and respiratory depression. Patient was seen by intensivist at bedside. Patient had some improvement with flumazenil, but remained somnolent.     Plan:  - Transfer to ICU for further management of airway and alcohol withdrawal   - ABG results   - CXR with pulmonary edema and likely small moderate bilateral pleural effusion   - CIWA protocol in place, requested that tremor not be included in calculation given essential tremor at baseline  - Patient completed librium taper 10/8/23  - Prior to dc patient is interested in trying naltrexone   - Provide resources for AA at dc   - Continue multivitamin and thiamine supplement     Fever  Assessment & Plan  Patient with fever of 100.6F  on 10/7/23 at 0407. Patient denied feeling feverish or chills.    -Respiratory panel negative  -UA and urine culture obtained with [facility] placement on 10/8/23 due to flank pain despite being afebrile     Conjunctivitis of both eyes  Assessment & Plan  Bilateral conjunctivitis of the eye, with dried discharge on the eyelids.     - Trimethoprim-polymyxin four times daily for 7 days    Elevated troponin  Assessment & Plan  Pt with troponin at 29 and 31 on admission. EKG unremarkable. Pt with no chest pain. Mildly elevated troponin could be due to low volume status. Patient  denies chest pain.  Plan:     - If symptoms arise can get EKG and troponin    Macrocytic anemia  Assessment & Plan  Pt with hemoglobin of 12.7 and MCV at 104.5 on admission; Likely due to heavy alcohol use.     Plan:   - Continue to monitor CBC    - Continue multivitamin and thiamine supplement     Thrombocytopenia (HCC)  Assessment & Plan  Pt with chronically low platelets. This is likely due to heavy alcohol use.   Plan:   - Continue to monitor CBC     Essential tremor  Assessment & Plan  Pt reports hx of essential tremor. Alcohol use helps reduce symptoms. Has not tried medications in the past.   Plan:   - Ordered OT  - Consider medications, possible beta blocker with improving CIWA      Hypokalemia  Assessment & Plan  Low on admission, improving    Plan:   - Will replete potassium prn  - Monitor CMP daily     Syncope and collapse  Assessment & Plan  Pt with lightheadedness and syncope. CT head was unremarkable.  Likely due to orthostatic hypotension and deconditioning. Pt also with chronic alcoholism.     After extensive hx taking (pt poor hx), likely syncope d/t dehydration, hypovolemia, vs intoxication    Plan:   - PT recommend front wheel walker with home health at discharge, will continue to follow  - OT  - Pending orthostatic vitals   - Continue  mL/hr, discontinue if fluid overload   - Will also have nutrition evaluate at patient as intake has been poor     Neuropathy  Assessment & Plan  Patient reports chronic neuropathy. He denies hx of diabetes however hba1c 3 months ago was 6. Previously on gabapentin with no relief.   Plan:   - HgbA1c 5.6  - PT recommending rehab placement today due to worsening stability on exam, will continue to follow patient     Hyperlipidemia  Assessment & Plan  Continue home medication of rosuvastatin 20 mg daily     Benign prostatic hyperplasia with urinary retention  Assessment & Plan  Patient had to be straight cath 2x and continued to have around 300 mL for post void  residuals. Maza was placed on 10/8 with at least 300 mL removed.    - Maza in place   - Continue home medication of doxazosin 4 mg     Primary hypertension  Assessment & Plan  Pt with pmhx of hypertension, takes losartan 50 mg.   Plan:   - Continue home medication   - PRN antihypertension for SBP greater than 180 or DBP greater than 110            Code Status: Full    Dispo: patient transferred to ICU for further management

## 2023-10-09 NOTE — THERAPY
Speech Language Therapy Contact Note    Patient Name: Balwinder Grimes  Age:  72 y.o., Sex:  male  Medical Record #: [id number]  Today's Date: 10/9/2023    Discussed missed therapy with RN, Anita Ball.        10/09/23 1308   Treatment Variance   Reason For Missed Therapy Medical - Other (Please Comment)   Initial Contact Note    Initial Contact Note  Order Received and Verified, Speech Therapy Evaluation in Progress with Full Report to Follow.   Interdisciplinary Plan of Care Collaboration   IDT Collaboration with  Nursing;Other (See Comments)  (EMR)   Collaboration Comments ST consult received. Per RN/EMR pt has been intubated. SLP to monitor and complete eval as pt is medically stable/able to participate. Thank you.

## 2023-10-09 NOTE — CARE PLAN
The patient is Stable - Low risk of patient condition declining or worsening    Shift Goals  Clinical Goals: CIWA score will remain below 15 this shift  Patient Goals: MAXX  Family Goals: MAXX    Progress made toward(s) clinical / shift goals:  Pt has remained free from falls on this admit, bed alarm on, telesitter in place for impulsivity, seizure precautions in place, CIWA less than 10 so far this shift    Patient is not progressing towards the following goals: D/O location      Problem: Optimal Care for Alcohol Withdrawal  Goal: Optimal Care for the alcohol withdrawal patient  Outcome: Progressing     Problem: Seizure Precautions  Goal: Implementation of seizure precautions  Outcome: Progressing     Problem: Fall Risk  Goal: Patient will remain free from falls  Outcome: Progressing

## 2023-10-09 NOTE — PROGRESS NOTES
4 Eyes Skin Assessment Completed by Anita RN and Ajit RN.    Head Scab, Blanching, and Redness  Ears Redness and Blanching  Nose Redness and Blanching  Mouth WDL  Neck WDL  Breast/Chest WDL  Shoulder Blades WDL  Spine WDL  (R) Arm/Elbow/Hand Blanching and Scab  (L) Arm/Elbow/Hand Redness, Blanching, and Scab  Abdomen WDL  Groin Redness and Excoriation  Scrotum/Coccyx/Buttocks Redness and Excoriation  (R) Leg WDL  (L) Leg Scab  (R) Heel/Foot/Toe Redness and Blanching  (L) Heel/Foot/Toe Redness and Blanching          Devices In Places ECG, Blood Pressure Cuff, Pulse Ox, Maza, SCD's, and Oxy Mask      Interventions In Place Pillows, Q2 Turns, Heels Loaded W/Pillows, and Pressure Redistribution Mattress    Possible Skin Injury No    Pictures Uploaded Into Epic Yes  Wound Consult Placed N/A  RN Wound Prevention Protocol Ordered Yes

## 2023-10-09 NOTE — CARE PLAN
The patient is Watcher - Medium risk of patient condition declining or worsening    Shift Goals  Clinical Goals: Airway protection  Patient Goals: MAXX  Family Goals: updates on POC    Progress made toward(s) clinical / shift goals:    Problem: Safety - Medical Restraint  Goal: Remains free of injury from restraints (Restraint for Interference with Medical Device)  Outcome: Progressing     Problem: Pain - Standard  Goal: Alleviation of pain or a reduction in pain to the patient’s comfort goal  Outcome: Progressing       Patient is not progressing towards the following goals:      Problem: Optimal Care for Alcohol Withdrawal  Goal: Optimal Care for the alcohol withdrawal patient  Outcome: Not Progressing

## 2023-10-09 NOTE — PROGRESS NOTES
Monitor Summary    Rhythm: SB-SR-ST  Heart Rate:   Ectopy: occ PVCs  Measurements:   0.18/0.8/0.38                  12 hr chart check

## 2023-10-09 NOTE — ASSESSMENT & PLAN NOTE
Secondary to aspiration pneumonitis, atelectasis. altered mental status, alcohol withdrawal with delirium, hypercarbic respiratory failure, benzodiazepine overdose  Intubated 10/9  Extubated 10/10    Telemetry monitoring   Continuous pulse oximetry   Supplemental O2 for Goal saturation >92%   Respiratory treatments: prn   Eventual encouragement of incentive spirometry/mobilization when appropriate  Pending SLP evaluation  Continue diuresis with lasix for potential HFpEF  Pulmonary hygiene, percussion therapy

## 2023-10-09 NOTE — PROGRESS NOTES
Patient arrived on unit escorted by RRT.  All monitors in place, drips and lines verified. Patient unable to be educated to room and unit policies. Call light in reach, bed in lowest position.

## 2023-10-09 NOTE — PROCEDURES
Date: 10/9/2023  Time: 1240    Procedure: Rapid sequence Intubation    Indication: Acute Hypoxic Respiratory Failure    Physician:  Gagandeep Tinajero D.O.  Supervising Physician: Jeremy Gonda, MD    Consent:  Emergent in nature    Procedure:  A time out occurred with the patient name, medical record number, allergies, and consent with right procedure and indication with bedside nurse and if able patient. The patient was connected to the monitor, had pulse oxymetery, and blood pressure monitored and was positioned optimaily. The patient had oxygen applied and used apneic oxygenation technique to limit desaturation.  The patient then was given 20 mg of Etomidate for induction and 100 mg of Rocuronium for paralysis. A video laryngoscopy  was inserted into the mouth with good view of the vocal cords. The ET passed easily and was secured. Bilateral breath sounds were heard and condensation and color change was present.  A chest xray was ordered and will be reviewed for correct placement.    Gagandeep Tinajero DO  PGY-3 Internal Medicine Resident

## 2023-10-09 NOTE — PROGRESS NOTES
Rapid Response Summary     Rapid response called at 1019 for: Increased oxygen demand  and Altered mental status     VS: Tachycardia  (See Vitals Flowsheet)  Additional info: NT suctioned, placed on NRB   MD Paged: Dr. Rashid, Dr. Gonda consulted  Interventions:    Imaging/Tests: Chest X-ray and EKG    Labs: BMP and ABG    Medications:  Flumazanil, 0.5 mg total given   Other:  NT suctioned  Disposition: Improved with rapid response team interventions. Primary RN updated on plan of care. Patient transferred to ICU.    RR ended at 1050. ICU RNs present until pt moved to T625.

## 2023-10-09 NOTE — CONSULTS
Critical Care Consultation    Date of consult: 10/9/2023    Referring Physician  Talia Pope M.D.    Reason for Consultation  Acute hypoxic respiratory failure, altered mental status    History of Presenting Illness  72 y.o. male who presented 10/4/2023 with a past medical history significant for hypertension, hyperlipidemia, and alcohol abuse who was admitted to the hospital on October 4 after a syncopal event resulting in forehead trauma.  He had had increasing dizziness for the last 2 weeks and had accidentally taken a double dose of doxazosin.  He also reported recent discharge from alcohol rehab and reported being sober for 10 days but his admission alcohol level was 0.39.  He was admitted to the [facility] family medicine service for syncope work-up and alcohol withdrawal.  Overnight, patient received 20 mg of Ativan for a CIWA scale up to 26 and this morning when his primary team assessed him, he was found to be obtunded and hypoxic.  He required escalation to 15 L/min facemask and a large amount of oral secretions had to be suctioned out of his mouth.  A chest x-ray showed new onset bilateral lower lobe infiltrates.  A rapid response was called and the patient was administered 0.5 mg of flumazenil by the rapid response team in 2 divided doses with some improvement in his wakeful state.  A point-of-care glucose was 109.  I was consulted at bedside to evaluate the patient for respiratory failure and altered mental status.  He was starting to awaken and follow commands slowly on my exam but was unable to provide any additional history given his current clinical condition.    Code Status  Full Code    Review of Systems  Review of Systems   Unable to perform ROS: Mental status change       Past Medical History   has a past medical history of High cholesterol, Hypertension, Numbness and tingling, and Pain (08/2018).    Surgical History   has a past surgical history that includes other abdominal surgery (1983); other  abdominal surgery (2005); dupuytren contracture release (Right, 2010); other orthopedic surgery (2010); and lumbar decompression (8/21/2018).    Family History  family history is not on file.    Social History   reports that he quit smoking about 15 years ago. His smoking use included cigarettes. He started smoking about 35 years ago. He has a 13.0 pack-year smoking history. He has never used smokeless tobacco. He reports that he does not currently use alcohol after a past usage of about 8.4 oz of alcohol per week. He reports that he does not use drugs.    Medications  Home Medications       Reviewed by Pedro Cuevas (Pharmacy Tech) on 10/04/23 at 1744  Med List Status: Complete     Medication Last Dose Status   acetaminophen (TYLENOL) 500 MG Tab 10/2/2023 Active   Ascorbic Acid (JAYLENE-C PO) ABOUT A WEEK AGO Active   B Complex Vitamins (B COMPLEX 1 PO) ABOUT A WEEK AGO Active   Cholecalciferol (VITAMIN D3 PO) ABOUT A WEEK AGO Active   doxazosin (CARDURA) 4 MG Tab 10/3/2023 Active   DULoxetine (CYMBALTA) 30 MG Cap DR Particles 10/4/2023 Active   ibuprofen (MOTRIN) 200 MG Tab 10/3/2023 Active   losartan (COZAAR) 50 MG Tab 10/4/2023 Active   rosuvastatin (CRESTOR) 20 MG Tab 10/3/2023 Active                  Current Facility-Administered Medications   Medication Dose Route Frequency Provider Last Rate Last Admin    QUEtiapine (SEROquel) tablet 25 mg  25 mg Oral HS PRN Frankie Leroy M.D.        Respiratory Therapy Consult   Nebulization Continuous RT Talia Pope M.D.        multivitamin tablet 1 Tablet  1 Tablet Oral DAILY Susan Bradshaw M.D.   1 Tablet at 10/08/23 0600    miconazole (Micotin) 2 % cream   Topical BID Liu Pena P.A.-C.   Given at 10/09/23 0416    polymixin-trimethoprim (Polytrim) 80931-4.1 UNIT/ML-% ophthalmic solution 1 Drop  1 Drop Both Eyes 4X/DAY Susan Bradshaw M.D.   1 Drop at 10/08/23 2050    thiamine (B-1) 200 mg in dextrose 5% 100 mL IVPB  200 mg Intravenous TID Susan BOND  ECHO Bradshaw 200 mL/hr at 10/08/23 2048 200 mg at 10/08/23 2048    labetalol (Normodyne/Trandate) injection 10 mg  10 mg Intravenous Q HOUR PRN Frankie Leroy M.D.        folic acid (Folvite) tablet 1 mg  1 mg Oral DAILY Elia Hart M.D.   1 mg at 10/08/23 0412    senna-docusate (Pericolace Or Senokot S) 8.6-50 MG per tablet 2 Tablet  2 Tablet Oral BID Talia Pope M.D.   2 Tablet at 10/08/23 0411    And    polyethylene glycol/lytes (Miralax) PACKET 1 Packet  1 Packet Oral QDAY PRN Talia Pope M.D.        And    magnesium hydroxide (Milk Of Magnesia) suspension 30 mL  30 mL Oral QDAY PRN Talia oPpe M.D.        And    bisacodyl (Dulcolax) suppository 10 mg  10 mg Rectal QDAY PRN Talia Pope M.D.        NS infusion   Intravenous Continuous Talia Pope M.D. 125 mL/hr at 10/09/23 0839 New Bag at 10/09/23 0839    enoxaparin (Lovenox) inj 40 mg  40 mg Subcutaneous DAILY AT 1800 Talia Pope M.D.   40 mg at 10/08/23 1723    acetaminophen (Tylenol) tablet 650 mg  650 mg Oral Q6HRS PRN Talia Pope M.D.        ondansetron (Zofran) syringe/vial injection 4 mg  4 mg Intravenous Q4HRS PRN Talia Pope M.D.        ondansetron (Zofran ODT) dispertab 4 mg  4 mg Oral Q4HRS PRN Talia Pope M.D.        LORazepam (Ativan) tablet 0.5 mg  0.5 mg Oral Q4HRS PRN Talia Pope M.D.        LORazepam (Ativan) tablet 1 mg  1 mg Oral Q4HRS PRN Talia Pope M.D.   1 mg at 10/08/23 2044    Or    LORazepam (Ativan) injection 0.5 mg  0.5 mg Intravenous Q4HRS PRN Talia Pope M.D.        LORazepam (Ativan) tablet 2 mg  2 mg Oral Q2HRS PRN Talia Pope M.D.   2 mg at 10/08/23 0410    Or    LORazepam (Ativan) injection 1 mg  1 mg Intravenous Q2HRS PRN Talia Pope M.D.   1 mg at 10/07/23 0148    LORazepam (Ativan) tablet 3 mg  3 mg Oral Q HOUR PRN Talia Pope M.D.   3 mg at 10/07/23 0647    Or    LORazepam (Ativan) injection 1.5 mg  1.5 mg Intravenous Q HOUR PRN Talia AMARAL  ECHO Pope   1.5 mg at 10/09/23 0405    LORazepam (Ativan) tablet 4 mg  4 mg Oral Q15 MIN PRN Talia Pope M.D.        Or    LORazepam (Ativan) injection 2 mg  2 mg Intravenous Q15 MIN PRN Talia Pope M.D.   2 mg at 10/09/23 0548    doxazosin (Cardura) tablet 4 mg  4 mg Oral Q EVENING Raisa Villa M.D.   4 mg at 10/08/23 2044    DULoxetine (Cymbalta) capsule 30 mg  30 mg Oral DAILY Raisa Villa M.D.   30 mg at 10/08/23 0412    losartan (Cozaar) tablet 50 mg  50 mg Oral DAILY Raisa Villa M.D.   50 mg at 10/08/23 0411    rosuvastatin (Crestor) tablet 20 mg  20 mg Oral Q EVENING Raisa Villa M.D.   20 mg at 10/08/23 2044       Allergies  No Known Allergies    Vital Signs last 24 hours  Temp:  [36.5 °C (97.7 °F)-37.3 °C (99.1 °F)] 37.1 °C (98.8 °F)  Pulse:  [] 116  Resp:  [18-30] 18  BP: (137-160)/(73-99) 139/85  SpO2:  [90 %-98 %] 98 %    Physical Exam  Physical Exam  Vitals and nursing note reviewed.   Constitutional:       General: He is in acute distress.      Appearance: He is well-developed and overweight. He is ill-appearing. He is not diaphoretic.      Interventions: Face mask in place.   HENT:      Head: Normocephalic and atraumatic.      Nose: Nose normal. No congestion.      Mouth/Throat:      Mouth: Mucous membranes are dry.      Pharynx: Oropharynx is clear. No oropharyngeal exudate.   Eyes:      General: No scleral icterus.     Conjunctiva/sclera: Conjunctivae normal.      Pupils: Pupils are equal, round, and reactive to light.   Neck:      Vascular: No JVD.   Cardiovascular:      Rate and Rhythm: Regular rhythm. Tachycardia present. Occasional Extrasystoles are present.     Chest Wall: PMI is not displaced.      Pulses: Normal pulses.      Heart sounds: Normal heart sounds. No murmur heard.  Pulmonary:      Effort: Tachypnea and respiratory distress present. No accessory muscle usage or retractions.      Breath sounds: Transmitted upper airway sounds present. No stridor.  Examination of the right-lower field reveals rhonchi. Examination of the left-lower field reveals rhonchi. Rhonchi present. No wheezing.      Comments: Gag intact with strong cough with oral suctioning  Abdominal:      General: Bowel sounds are normal. There is no distension.      Palpations: Abdomen is soft.      Tenderness: There is no abdominal tenderness. There is no guarding or rebound.   Musculoskeletal:         General: No tenderness.      Cervical back: Neck supple. No rigidity or tenderness.      Right lower leg: No edema.      Left lower leg: No edema.   Skin:     General: Skin is warm and dry.      Capillary Refill: Capillary refill takes less than 2 seconds.      Coloration: Skin is pale.   Neurological:      General: No focal deficit present.      Mental Status: He is easily aroused. He is lethargic and disoriented.      Cranial Nerves: No cranial nerve deficit.      Sensory: No sensory deficit.      Motor: Weakness (Generalized) present.      Comments: Awakens and follows commands slowly, moves all extremities, fine tremor noted   Psychiatric:         Behavior: Behavior is cooperative.         Fluids    Intake/Output Summary (Last 24 hours) at 10/9/2023 1040  Last data filed at 10/9/2023 0500  Gross per 24 hour   Intake 0 ml   Output 1550 ml   Net -1550 ml       Laboratory  Recent Results (from the past 48 hour(s))   URINALYSIS    Collection Time: 10/07/23  1:15 PM    Specimen: Urine, Clean Catch   Result Value Ref Range    Color DK Yellow     Character Clear     Specific Gravity 1.017 <1.035    Ph 5.5 5.0 - 8.0    Glucose Negative Negative mg/dL    Ketones 15 (A) Negative mg/dL    Protein Negative Negative mg/dL    Bilirubin Small (A) Negative    Urobilinogen, Urine 1.0 Negative    Nitrite Negative Negative    Leukocyte Esterase Trace (A) Negative    Occult Blood Negative Negative    Micro Urine Req Microscopic    URINE MICROSCOPIC (W/UA)    Collection Time: 10/07/23  1:15 PM   Result Value Ref Range     WBC 0-2 (A) /hpf    RBC 0-2 (A) /hpf    Bacteria Negative None /hpf    Epithelial Cells Negative /hpf    Hyaline Cast 0-2 /lpf   CoV-2, Flu A/B, And RSV by PCR ([employer])    Collection Time: 10/07/23  5:02 PM    Specimen: Nasopharyngeal; Respirate   Result Value Ref Range    Influenza virus A RNA Negative Negative    Influenza virus B, PCR Negative Negative    RSV, PCR Negative Negative    SARS-CoV-2 by PCR NotDetected     SARS-CoV-2 Source NP Swab    CBC WITH DIFFERENTIAL    Collection Time: 10/08/23 12:51 AM   Result Value Ref Range    WBC 4.8 4.8 - 10.8 K/uL    RBC 3.43 (L) 4.70 - 6.10 M/uL    Hemoglobin 12.2 (L) 14.0 - 18.0 g/dL    Hematocrit 36.3 (L) 42.0 - 52.0 %    .8 (H) 81.4 - 97.8 fL    MCH 35.6 (H) 27.0 - 33.0 pg    MCHC 33.6 32.3 - 36.5 g/dL    RDW 44.8 35.9 - 50.0 fL    Platelet Count 115 (L) 164 - 446 K/uL    MPV 10.3 9.0 - 12.9 fL    Neutrophils-Polys 58.10 44.00 - 72.00 %    Lymphocytes 16.60 (L) 22.00 - 41.00 %    Monocytes 21.60 (H) 0.00 - 13.40 %    Eosinophils 2.90 0.00 - 6.90 %    Basophils 0.60 0.00 - 1.80 %    Immature Granulocytes 0.20 0.00 - 0.90 %    Nucleated RBC 0.00 0.00 - 0.20 /100 WBC    Neutrophils (Absolute) 2.77 1.82 - 7.42 K/uL    Lymphs (Absolute) 0.79 (L) 1.00 - 4.80 K/uL    Monos (Absolute) 1.03 (H) 0.00 - 0.85 K/uL    Eos (Absolute) 0.14 0.00 - 0.51 K/uL    Baso (Absolute) 0.03 0.00 - 0.12 K/uL    Immature Granulocytes (abs) 0.01 0.00 - 0.11 K/uL    NRBC (Absolute) 0.00 K/uL   Comp Metabolic Panel    Collection Time: 10/08/23 12:51 AM   Result Value Ref Range    Sodium 138 135 - 145 mmol/L    Potassium 3.6 3.6 - 5.5 mmol/L    Chloride 105 96 - 112 mmol/L    Co2 21 20 - 33 mmol/L    Anion Gap 12.0 7.0 - 16.0    Glucose 93 65 - 99 mg/dL    Bun 7 (L) 8 - 22 mg/dL    Creatinine 0.57 0.50 - 1.40 mg/dL    Calcium 8.0 (L) 8.5 - 10.5 mg/dL    Correct Calcium 8.8 8.5 - 10.5 mg/dL    AST(SGOT) 14 12 - 45 U/L    ALT(SGPT) 14 2 - 50 U/L    Alkaline Phosphatase 96 30 - 99 U/L     Total Bilirubin 1.0 0.1 - 1.5 mg/dL    Albumin 3.0 (L) 3.2 - 4.9 g/dL    Total Protein 5.4 (L) 6.0 - 8.2 g/dL    Globulin 2.4 1.9 - 3.5 g/dL    A-G Ratio 1.3 g/dL   ESTIMATED GFR    Collection Time: 10/08/23 12:51 AM   Result Value Ref Range    GFR (CKD-EPI) 104 >60 mL/min/1.73 m 2   URINALYSIS    Collection Time: 10/08/23  1:20 PM    Specimen: Urine, Carnes Cath   Result Value Ref Range    Color Yellow     Character Clear     Specific Gravity 1.016 <1.035    Ph 5.5 5.0 - 8.0    Glucose Negative Negative mg/dL    Ketones 80 (A) Negative mg/dL    Protein Negative Negative mg/dL    Bilirubin Negative Negative    Urobilinogen, Urine 1.0 Negative    Nitrite Negative Negative    Leukocyte Esterase Negative Negative    Occult Blood Moderate (A) Negative    Micro Urine Req Microscopic    URINE CULTURE(NEW)    Collection Time: 10/08/23  1:20 PM    Specimen: Urine, Carnes Cath   Result Value Ref Range    Significant Indicator POS (POS)     Source UR     Site URINE, CARNES CATH     Culture Result - (A)     Culture Result Group D Enterococcus species  >100,000 cfu/mL   (A)    URINE MICROSCOPIC (W/UA)    Collection Time: 10/08/23  1:20 PM   Result Value Ref Range    WBC 0-2 (A) /hpf    RBC 20-50 (A) /hpf    Bacteria Negative None /hpf    Epithelial Cells Negative /hpf    Hyaline Cast 0-2 /lpf   CBC WITHOUT DIFFERENTIAL    Collection Time: 10/09/23 12:31 AM   Result Value Ref Range    WBC 4.7 (L) 4.8 - 10.8 K/uL    RBC 3.54 (L) 4.70 - 6.10 M/uL    Hemoglobin 12.6 (L) 14.0 - 18.0 g/dL    Hematocrit 36.3 (L) 42.0 - 52.0 %    .5 (H) 81.4 - 97.8 fL    MCH 35.6 (H) 27.0 - 33.0 pg    MCHC 34.7 32.3 - 36.5 g/dL    RDW 43.0 35.9 - 50.0 fL    Platelet Count 123 (L) 164 - 446 K/uL    MPV 10.3 9.0 - 12.9 fL   Basic Metabolic Panel    Collection Time: 10/09/23 12:31 AM   Result Value Ref Range    Sodium 137 135 - 145 mmol/L    Potassium 3.6 3.6 - 5.5 mmol/L    Chloride 106 96 - 112 mmol/L    Co2 19 (L) 20 - 33 mmol/L    Glucose 98 65 -  99 mg/dL    Bun 6 (L) 8 - 22 mg/dL    Creatinine 0.44 (L) 0.50 - 1.40 mg/dL    Calcium 7.9 (L) 8.5 - 10.5 mg/dL    Anion Gap 12.0 7.0 - 16.0   ESTIMATED GFR    Collection Time: 10/09/23 12:31 AM   Result Value Ref Range    GFR (CKD-EPI) 112 >60 mL/min/1.73 m 2   EKG    Collection Time: 10/09/23 10:38 AM   Result Value Ref Range    Report       [facility]    Test Date:  2023-10-09  Pt Name:    PER FLOYD               Department: 183  MRN:        [id number]                      Room:       T824  Gender:     Male                         Technician: FGJ  :        [id number]                   Requested By:MICHELE FUENTES  Order #:    [id number]                    Reading MD:    Measurements  Intervals                                Axis  Rate:       110                          P:          53  GA:         159                          QRS:        70  QRSD:       71                           T:          50  QT:         308  QTc:        417    Interpretive Statements  Sinus tachycardia  Probable left atrial enlargement  Low voltage, precordial leads  Probable anteroseptal infarct, old  Compared to ECG 10/04/2023 15:30:45  Myocardial infarct finding now present  Sinus rhythm no longer present         Imaging  DX-CHEST-PORTABLE (1 VIEW)   Final Result      1.  Pulmonary edema. Multifocal pneumonia could have a similar appearance.   2.  Likely small/moderate bilateral pleural effusions.      DX-CHEST-PORTABLE (1 VIEW)   Final Result      No acute cardiopulmonary abnormality.      CT-CSPINE WITHOUT PLUS RECONS   Final Result      No acute fracture or dislocation of the cervical spine.      CT-HEAD W/O   Final Result      1.  Cerebral atrophy.      2.  White matter lucencies most consistent with small vessel ischemic change versus demyelination or gliosis.      3.  Otherwise, Head CT without contrast with no acute findings. No evidence of acute cerebral infarction, hemorrhage or mass lesion.         EC-ECHOCARDIOGRAM  COMPLETE W/O CONT    (Results Pending)    *Personally reviewed chest x-ray showing new diffuse bilateral infiltrates worse in bilateral lower lobes    Assessment/Plan  * Alcohol withdrawal syndrome, with delirium (HCC)  Assessment & Plan  Worsening in last 24 hours  Transferred to ICU for Precedex drip with scheduled and as needed Ativan monitoring RASS  Aspiration and seizure precautions  Continue vitamin supplementation  Eventual alcohol cessation education and resources to be provided when appropriate    Aspiration pneumonitis (HCC)  Assessment & Plan  Secondary to alcohol withdrawal syndrome  Blood cultures, sputum culture, procalcitonin  Hold off on antibiotics for now with low threshold to initiate should he develop fevers or worsening respiratory failure  Ongoing aspiration precautions, SLP evaluation    Acute respiratory failure with hypoxia (HCC)  Assessment & Plan  Secondary to aspiration pneumonitis and atelectasis  RT/O2 protocol titrating oxygen therapy to keep SPO2 greater than 92%  ABG  Close monitoring for ability to protect airway with low threshold to intubate patient if worsens  Eventual encouragement of incentive spirometry/mobilization when appropriate  Begin IV Lasix for small pulmonary edema component    Syncope and collapse- (present on admission)  Assessment & Plan  Admitted for syncope 10/4, likely alcohol related  Continuous telemetry monitoring  Echocardiogram  Fall precautions    Elevated troponin  Assessment & Plan  Secondary to demand ischemia    Neuropathy- (present on admission)  Assessment & Plan  Continue Cymbalta    Hyperlipidemia- (present on admission)  Assessment & Plan  Continue rosuvastatin    Primary hypertension- (present on admission)  Assessment & Plan  Continue doxazosin, losartan  Begin Lasix    Macrocytic anemia  Assessment & Plan  Unchanged, no signs of active bleeding  Daily CBC with conservative transfusion strategy    Thrombocytopenia (HCC)  Assessment &  Plan  Slowly improving, likely alcohol related  Monitor with daily CBC    Essential tremor  Assessment & Plan  Monitor    Hypokalemia  Assessment & Plan  Replete and monitor closely    Benign prostatic hyperplasia with urinary retention- (present on admission)  Assessment & Plan  Continue doxazosin        Discussed patient condition and risk of morbidity and/or mortality with RN, RT, Pharmacy, Charge nurse / hot rounds, Patient, and [facility].    The patient remains critically ill.  Critical care time = 50 minutes in directly providing and coordinating critical care and extensive data review.  No time overlap and excludes procedures.    Please note that this dictation was created using voice recognition software. I have made every reasonable attempt to correct obvious errors, but there may be errors of grammar and possibly content that I did not discover before finalizing the note.

## 2023-10-09 NOTE — ASSESSMENT & PLAN NOTE
Continue doxazosin, losartan  Continue Lasix, considering down titrating Doxazosin 4 mg for orthostatic hypotension

## 2023-10-09 NOTE — CARE PLAN
Ventilator Daily Summary    Vent Day # 1  8.0 @ 24 at the teeth  APV 20, 500, +8, 50%    Weaning trials: Pt not appropriate for weaning at this time.    Respiratory Procedures: Diagnostic Bronch    Plan: Continue current ventilator settings and wean mechanical ventilation as tolerated per physician orders.        Problem: Ventilation  Goal: Ability to achieve and maintain unassisted ventilation or tolerate decreased levels of ventilator support  Description: Target End Date:  4 days     Document on Vent flowsheet    1.  Support and monitor invasive and noninvasive mechanical ventilation  2.  Monitor ventilator weaning response  3.  Perform ventilator associated pneumonia prevention interventions  4.  Manage ventilation therapy by monitoring diagnostic test results  Outcome: Progressing

## 2023-10-10 ENCOUNTER — APPOINTMENT (OUTPATIENT)
Dept: CARDIOLOGY | Facility: MEDICAL CENTER | Age: 73
DRG: 896 | End: 2023-10-10
Attending: INTERNAL MEDICINE
Payer: MEDICARE

## 2023-10-10 ENCOUNTER — APPOINTMENT (OUTPATIENT)
Dept: RADIOLOGY | Facility: MEDICAL CENTER | Age: 73
DRG: 896 | End: 2023-10-10
Attending: INTERNAL MEDICINE
Payer: MEDICARE

## 2023-10-10 PROBLEM — J96.90 RESPIRATORY FAILURE REQUIRING INTUBATION (HCC): Status: ACTIVE | Noted: 2023-10-09

## 2023-10-10 PROBLEM — N39.0 URINARY TRACT INFECTION: Status: ACTIVE | Noted: 2023-10-10

## 2023-10-10 LAB
ANION GAP SERPL CALC-SCNC: 12 MMOL/L (ref 7–16)
BACTERIA UR CULT: ABNORMAL
BACTERIA UR CULT: ABNORMAL
BASE EXCESS BLDV CALC-SCNC: -1 MMOL/L (ref -4–3)
BASE EXCESS BLDV CALC-SCNC: -4 MMOL/L (ref -4–3)
BASOPHILS # BLD AUTO: 0.7 % (ref 0–1.8)
BASOPHILS # BLD: 0.05 K/UL (ref 0–0.12)
BODY TEMPERATURE: ABNORMAL DEGREES
BODY TEMPERATURE: ABNORMAL DEGREES
BREATHS SETTING VENT: 20
BREATHS SETTING VENT: 20
BUN SERPL-MCNC: 7 MG/DL (ref 8–22)
CALCIUM SERPL-MCNC: 8.2 MG/DL (ref 8.5–10.5)
CHLORIDE SERPL-SCNC: 104 MMOL/L (ref 96–112)
CO2 BLDV-SCNC: 20 MMOL/L (ref 20–33)
CO2 BLDV-SCNC: 21 MMOL/L (ref 20–33)
CO2 SERPL-SCNC: 22 MMOL/L (ref 20–33)
CREAT SERPL-MCNC: 0.64 MG/DL (ref 0.5–1.4)
CRP SERPL HS-MCNC: 12.31 MG/DL (ref 0–0.75)
DELSYS IDSYS: ABNORMAL
DELSYS IDSYS: ABNORMAL
END TIDAL CARBON DIOXIDE IECO2: 24 MMHG
END TIDAL CARBON DIOXIDE IECO2: 29 MMHG
EOSINOPHIL # BLD AUTO: 0.11 K/UL (ref 0–0.51)
EOSINOPHIL NFR BLD: 1.6 % (ref 0–6.9)
ERYTHROCYTE [DISTWIDTH] IN BLOOD BY AUTOMATED COUNT: 42.5 FL (ref 35.9–50)
GFR SERPLBLD CREATININE-BSD FMLA CKD-EPI: 100 ML/MIN/1.73 M 2
GLUCOSE BLD STRIP.AUTO-MCNC: 105 MG/DL (ref 65–99)
GLUCOSE SERPL-MCNC: 128 MG/DL (ref 65–99)
HCO3 BLDV-SCNC: 19.4 MMOL/L (ref 24–28)
HCO3 BLDV-SCNC: 19.9 MMOL/L (ref 24–28)
HCT VFR BLD AUTO: 36.4 % (ref 42–52)
HGB BLD-MCNC: 12.9 G/DL (ref 14–18)
HOROWITZ INDEX BLDV+IHG-RTO: 120 MM[HG]
HOROWITZ INDEX BLDV+IHG-RTO: 86 MM[HG]
IMM GRANULOCYTES # BLD AUTO: 0.01 K/UL (ref 0–0.11)
IMM GRANULOCYTES NFR BLD AUTO: 0.1 % (ref 0–0.9)
LV EJECT FRACT  99904: 55
LYMPHOCYTES # BLD AUTO: 0.69 K/UL (ref 1–4.8)
LYMPHOCYTES NFR BLD: 10 % (ref 22–41)
MAGNESIUM SERPL-MCNC: 1.7 MG/DL (ref 1.5–2.5)
MCH RBC QN AUTO: 35.9 PG (ref 27–33)
MCHC RBC AUTO-ENTMCNC: 35.4 G/DL (ref 32.3–36.5)
MCV RBC AUTO: 101.4 FL (ref 81.4–97.8)
MODE IMODE: ABNORMAL
MODE IMODE: ABNORMAL
MONOCYTES # BLD AUTO: 1.28 K/UL (ref 0–0.85)
MONOCYTES NFR BLD AUTO: 18.6 % (ref 0–13.4)
NEUTROPHILS # BLD AUTO: 4.75 K/UL (ref 1.82–7.42)
NEUTROPHILS NFR BLD: 69 % (ref 44–72)
NRBC # BLD AUTO: 0 K/UL
NRBC BLD-RTO: 0 /100 WBC (ref 0–0.2)
O2/TOTAL GAS SETTING VFR VENT: 50 %
O2/TOTAL GAS SETTING VFR VENT: 50 %
PCO2 BLDV: 23.2 MMHG (ref 41–51)
PCO2 BLDV: 28.4 MMHG (ref 41–51)
PCO2 TEMP ADJ BLDV: 22.7 MMHG (ref 41–51)
PCO2 TEMP ADJ BLDV: 28.5 MMHG (ref 41–51)
PEEP END EXPIRATORY PRESSURE IPEEP: 8 CMH20
PEEP END EXPIRATORY PRESSURE IPEEP: 8 CMH20
PH BLDV: 7.44 [PH] (ref 7.31–7.45)
PH BLDV: 7.54 [PH] (ref 7.31–7.45)
PH TEMP ADJ BLDV: 7.44 [PH] (ref 7.31–7.45)
PH TEMP ADJ BLDV: 7.55 [PH] (ref 7.31–7.45)
PHOSPHATE SERPL-MCNC: 1.8 MG/DL (ref 2.5–4.5)
PLATELET # BLD AUTO: 164 K/UL (ref 164–446)
PMV BLD AUTO: 10.3 FL (ref 9–12.9)
PO2 BLDV: 43 MMHG (ref 25–40)
PO2 BLDV: 60 MMHG (ref 25–40)
PO2 TEMP ADJ BLDV: 43 MMHG (ref 25–40)
PO2 TEMP ADJ BLDV: 58 MMHG (ref 25–40)
POTASSIUM SERPL-SCNC: 3.2 MMOL/L (ref 3.6–5.5)
PREALB SERPL-MCNC: 5.9 MG/DL (ref 18–38)
RBC # BLD AUTO: 3.59 M/UL (ref 4.7–6.1)
SAO2 % BLDV: 81 %
SAO2 % BLDV: 94 %
SIGNIFICANT IND 70042: ABNORMAL
SITE SITE: ABNORMAL
SODIUM SERPL-SCNC: 138 MMOL/L (ref 135–145)
SOURCE SOURCE: ABNORMAL
SPECIMEN DRAWN FROM PATIENT: ABNORMAL
SPECIMEN DRAWN FROM PATIENT: ABNORMAL
TIDAL VOLUME IVT: 500 ML
TIDAL VOLUME IVT: 500 ML
WBC # BLD AUTO: 6.9 K/UL (ref 4.8–10.8)

## 2023-10-10 PROCEDURE — 700111 HCHG RX REV CODE 636 W/ 250 OVERRIDE (IP): Performed by: INTERNAL MEDICINE

## 2023-10-10 PROCEDURE — 84134 ASSAY OF PREALBUMIN: CPT

## 2023-10-10 PROCEDURE — 84100 ASSAY OF PHOSPHORUS: CPT

## 2023-10-10 PROCEDURE — 94150 VITAL CAPACITY TEST: CPT

## 2023-10-10 PROCEDURE — 93306 TTE W/DOPPLER COMPLETE: CPT

## 2023-10-10 PROCEDURE — 93306 TTE W/DOPPLER COMPLETE: CPT | Mod: 26 | Performed by: INTERNAL MEDICINE

## 2023-10-10 PROCEDURE — 700111 HCHG RX REV CODE 636 W/ 250 OVERRIDE (IP): Mod: JZ | Performed by: STUDENT IN AN ORGANIZED HEALTH CARE EDUCATION/TRAINING PROGRAM

## 2023-10-10 PROCEDURE — 71045 X-RAY EXAM CHEST 1 VIEW: CPT

## 2023-10-10 PROCEDURE — 94799 UNLISTED PULMONARY SVC/PX: CPT

## 2023-10-10 PROCEDURE — 85025 COMPLETE CBC W/AUTO DIFF WBC: CPT

## 2023-10-10 PROCEDURE — 99291 CRITICAL CARE FIRST HOUR: CPT | Performed by: INTERNAL MEDICINE

## 2023-10-10 PROCEDURE — 83735 ASSAY OF MAGNESIUM: CPT

## 2023-10-10 PROCEDURE — 86140 C-REACTIVE PROTEIN: CPT

## 2023-10-10 PROCEDURE — 82803 BLOOD GASES ANY COMBINATION: CPT

## 2023-10-10 PROCEDURE — 700105 HCHG RX REV CODE 258: Performed by: STUDENT IN AN ORGANIZED HEALTH CARE EDUCATION/TRAINING PROGRAM

## 2023-10-10 PROCEDURE — 700105 HCHG RX REV CODE 258: Performed by: INTERNAL MEDICINE

## 2023-10-10 PROCEDURE — 80048 BASIC METABOLIC PNL TOTAL CA: CPT

## 2023-10-10 PROCEDURE — 36600 WITHDRAWAL OF ARTERIAL BLOOD: CPT

## 2023-10-10 PROCEDURE — 700102 HCHG RX REV CODE 250 W/ 637 OVERRIDE(OP): Performed by: INTERNAL MEDICINE

## 2023-10-10 PROCEDURE — 700111 HCHG RX REV CODE 636 W/ 250 OVERRIDE (IP): Performed by: STUDENT IN AN ORGANIZED HEALTH CARE EDUCATION/TRAINING PROGRAM

## 2023-10-10 PROCEDURE — 94003 VENT MGMT INPAT SUBQ DAY: CPT

## 2023-10-10 PROCEDURE — A9270 NON-COVERED ITEM OR SERVICE: HCPCS | Performed by: INTERNAL MEDICINE

## 2023-10-10 PROCEDURE — 770022 HCHG ROOM/CARE - ICU (200)

## 2023-10-10 PROCEDURE — 700101 HCHG RX REV CODE 250: Performed by: INTERNAL MEDICINE

## 2023-10-10 RX ORDER — MAGNESIUM SULFATE HEPTAHYDRATE 40 MG/ML
4 INJECTION, SOLUTION INTRAVENOUS ONCE
Status: COMPLETED | OUTPATIENT
Start: 2023-10-10 | End: 2023-10-10

## 2023-10-10 RX ADMIN — AMPICILLIN SODIUM 2000 MG: 2 INJECTION, POWDER, FOR SOLUTION INTRAVENOUS at 23:07

## 2023-10-10 RX ADMIN — AMPICILLIN SODIUM 2000 MG: 2 INJECTION, POWDER, FOR SOLUTION INTRAVENOUS at 12:50

## 2023-10-10 RX ADMIN — FENTANYL CITRATE 100 MCG: 50 INJECTION, SOLUTION INTRAMUSCULAR; INTRAVENOUS at 03:13

## 2023-10-10 RX ADMIN — PROPOFOL 20 MCG/KG/MIN: 10 INJECTION, EMULSION INTRAVENOUS at 07:02

## 2023-10-10 RX ADMIN — POLYMYXIN B SULFATE AND TRIMETHOPRIM 1 DROP: 10000; 1 SOLUTION OPHTHALMIC at 12:51

## 2023-10-10 RX ADMIN — FOLIC ACID 1 MG: 1 TABLET ORAL at 06:17

## 2023-10-10 RX ADMIN — FENTANYL CITRATE 100 MCG: 50 INJECTION, SOLUTION INTRAMUSCULAR; INTRAVENOUS at 05:54

## 2023-10-10 RX ADMIN — POLYMYXIN B SULFATE AND TRIMETHOPRIM 1 DROP: 10000; 1 SOLUTION OPHTHALMIC at 18:01

## 2023-10-10 RX ADMIN — FAMOTIDINE 20 MG: 10 INJECTION, SOLUTION INTRAVENOUS at 18:01

## 2023-10-10 RX ADMIN — LORAZEPAM 1 MG: 2 INJECTION INTRAMUSCULAR; INTRAVENOUS at 02:35

## 2023-10-10 RX ADMIN — AMPICILLIN SODIUM 2000 MG: 2 INJECTION, POWDER, FOR SOLUTION INTRAVENOUS at 06:09

## 2023-10-10 RX ADMIN — POLYMYXIN B SULFATE AND TRIMETHOPRIM 1 DROP: 10000; 1 SOLUTION OPHTHALMIC at 08:35

## 2023-10-10 RX ADMIN — POLYMYXIN B SULFATE AND TRIMETHOPRIM 1 DROP: 10000; 1 SOLUTION OPHTHALMIC at 20:45

## 2023-10-10 RX ADMIN — DULOXETINE HYDROCHLORIDE 30 MG: 30 CAPSULE, DELAYED RELEASE ORAL at 06:17

## 2023-10-10 RX ADMIN — LORAZEPAM 2 MG: 2 INJECTION INTRAMUSCULAR; INTRAVENOUS at 05:39

## 2023-10-10 RX ADMIN — LORAZEPAM 1 MG: 2 INJECTION INTRAMUSCULAR; INTRAVENOUS at 02:55

## 2023-10-10 RX ADMIN — LORAZEPAM 1 MG: 2 INJECTION INTRAMUSCULAR; INTRAVENOUS at 00:13

## 2023-10-10 RX ADMIN — THERA TABS 1 TABLET: TAB at 06:17

## 2023-10-10 RX ADMIN — AMPICILLIN SODIUM 2000 MG: 2 INJECTION, POWDER, FOR SOLUTION INTRAVENOUS at 01:22

## 2023-10-10 RX ADMIN — FUROSEMIDE 20 MG: 10 INJECTION, SOLUTION INTRAVENOUS at 06:28

## 2023-10-10 RX ADMIN — FENTANYL CITRATE 100 MCG: 50 INJECTION, SOLUTION INTRAMUSCULAR; INTRAVENOUS at 09:04

## 2023-10-10 RX ADMIN — MICONAZOLE NITRATE: 20 CREAM TOPICAL at 06:30

## 2023-10-10 RX ADMIN — AMPICILLIN SODIUM 2000 MG: 2 INJECTION, POWDER, FOR SOLUTION INTRAVENOUS at 18:04

## 2023-10-10 RX ADMIN — FUROSEMIDE 20 MG: 10 INJECTION, SOLUTION INTRAVENOUS at 15:40

## 2023-10-10 RX ADMIN — POTASSIUM PHOSPHATE, MONOBASIC POTASSIUM PHOSPHATE, DIBASIC 30 MMOL: 224; 236 INJECTION, SOLUTION, CONCENTRATE INTRAVENOUS at 08:34

## 2023-10-10 RX ADMIN — FAMOTIDINE 20 MG: 20 TABLET ORAL at 06:17

## 2023-10-10 RX ADMIN — MAGNESIUM SULFATE HEPTAHYDRATE 4 G: 40 INJECTION, SOLUTION INTRAVENOUS at 08:34

## 2023-10-10 RX ADMIN — ENOXAPARIN SODIUM 40 MG: 100 INJECTION SUBCUTANEOUS at 18:01

## 2023-10-10 ASSESSMENT — COPD QUESTIONNAIRES
HAVE YOU SMOKED AT LEAST 100 CIGARETTES IN YOUR ENTIRE LIFE: YES
DURING THE PAST 4 WEEKS HOW MUCH DID YOU FEEL SHORT OF BREATH: NONE/LITTLE OF THE TIME
COPD SCREENING SCORE: 4
DO YOU EVER COUGH UP ANY MUCUS OR PHLEGM?: NO/ONLY WITH OCCASIONAL COLDS OR INFECTIONS

## 2023-10-10 ASSESSMENT — PAIN DESCRIPTION - PAIN TYPE
TYPE: ACUTE PAIN

## 2023-10-10 ASSESSMENT — PULMONARY FUNCTION TESTS: FVC: 800

## 2023-10-10 ASSESSMENT — FIBROSIS 4 INDEX: FIB4 SCORE: 2.19

## 2023-10-10 NOTE — PROGRESS NOTES
Monitor Summary:    SB/ SR/ ST:  bpm. Rare PAC's.    Sinus makenna when on dexmedetomidine. Pt does not tolerate.  Sinus rhythm when RASS -1 to 0 using PRN push doses.  Sinus tach when experiencing an acute anxiety event, RASS +3.    .18/.09/.50

## 2023-10-10 NOTE — PROGRESS NOTES
Dexmedetomidine titrated off at 2106. Pt's RASS was -3 and HR was sustaining low 50's. Pt remained off sedation until 0013 when RASS became a +2. 1 mg Ativan administered, pt calmed right down.     0235: RASS +2, pt inching out of bed. Both legs hanging out of left side of bed. Pt scooted down to the bottom of the bed. 1 mg ativan administered.    0255: RASS +3, pt rearing up legs and jolting forward in an attempt to get OOB. Eyes remain closed. HOB flat, legs elevated. Second dose of 1 mg ativan administered.    0313: RASS +3, pt's behavior unchanged from previous. Pt continues to raise legs and jolt forward attempting to get up, out of bed. HR low 100's. RR increased to high 20's.     0330: RASS 0. HR down to 80's. Pt turned and readjusted in bed. ABG's drawn.     0413: Routine oral care performed. Pt aroused to a RASS +2, CPOT 5. HR back up into the 90's. BP elevated. Dexmedetomidine restarted at 0.2 mcg/kg/hr.

## 2023-10-10 NOTE — PROGRESS NOTES
Called to patient's bedside for rapid response concerning for respiratory distress; given patient's increasing CIWA scores overnight patient was given many doses of Ativan, that presumably caused respiratory depression; per nursing the patient for a brief moment reached oxygen saturations in the 60s, respiratory therapy was called patient put on nonrebreather mask on 15 L of oxygen and suctioning was done; patient given flumazenil by rapid response team for reversal of ativan due to somnolent state and hypoxia due to respiratory depression; patient became more responsive and oxygen saturations started to increase; chest x-ray showed interstitial infiltrates and/or edema, bilateral pleural effusions concerning for aspiration given clinical picture; intensivist was called and patient was transferred to the ICU.

## 2023-10-10 NOTE — HOSPITAL COURSE
"\"72 y.o. male who presented 10/4/2023 with a past medical history significant for hypertension, hyperlipidemia, and alcohol abuse who was admitted to the hospital on October 4 after a syncopal event resulting in forehead trauma.  He had had increasing dizziness for the last 2 weeks and had accidentally taken a double dose of doxazosin.  He also reported recent discharge from alcohol rehab and reported being sober for 10 days but his admission alcohol level was 0.39.  He was admitted to the [facility] family medicine service for syncope work-up and alcohol withdrawal.  Overnight, patient received 20 mg of Ativan for a CIWA scale up to 26 and this morning when his primary team assessed him, he was found to be obtunded and hypoxic.  He required escalation to 15 L/min facemask and a large amount of oral secretions had to be suctioned out of his mouth.  A chest x-ray showed new onset bilateral lower lobe infiltrates.  A rapid response was called and the patient was administered 0.5 mg of flumazenil by the rapid response team in 2 divided doses with some improvement in his wakeful state.  A point-of-care glucose was 109.  I was consulted at bedside to evaluate the patient for respiratory failure and altered mental status.  He was starting to awaken and follow commands slowly on my exam but was unable to provide any additional history given his current clinical condition.\"  "

## 2023-10-10 NOTE — ASSESSMENT & PLAN NOTE
Febrile with flank pain  Urine Cx (10/8) - E. Faecalis, sensitive to ampicillin, nitrofurantoin, penicillin    Ampicillin (10/9-) for 5 day course  Consider changing copeland

## 2023-10-10 NOTE — DIETARY
"Nutrition Support Assessment:  Day 6 of admit.  Balwinder Grimes is a 72 y.o. male with admitting DX of Syncope and collapse     Current problem list:  Respiratory failure requiring intubation  Aspiration pneumonitis  Alcohol withdrawal syndrome w/ delirium  Urinary tract infection  Syncope and collapse  Primary HTN  Elevated troponin  Macrocytic anemia  Thrombocytopenia  Essential tremor  Hypokalemia  Neuropathy  Hyperlipidemia     Assessment:  Estimated Nutritional Needs based on:   Height: 188 cm (6' 2.02\")  Weight: 96.6 kg (212 lb 15.4 oz)  Body mass index is 27.33 kg/m²., BMI classification: overweight    Calculation/Equation:   PSU (EMV = 7.7 L/min, Tmax = 37.8 C): RMR = 2054 kcals  MSJ: REE=1791 x 1.1 - 1.5 = 1970 - 2687 kcals    - 1932 - 2125 kcals  (20-22 kcals/kg)    - 144.9 - 193.2 kcals  (1.5 - 2 g/kg)     Evaluation:   Pt is intubated and sedated w/ consult for tube feed.  Pt has an OG tube w/ tip of tube in the stomach.   GI: LBM on 10/10/23  Labs: 10/10/23: potassium=3.2, glucose=128, phos=1.8, mag=1.7, pre-albumin=5.9,  CRP=12.31  Meds: Pepcid, folic acid, Lasix, MVI, potassium phosphate, senna-docusate, propofol @ 20 mcg/kg/min (9.9 mL/hour) providing 261 kcals/day  Vital HP is an appropriate formula. At a goal rate of 80 mL/hour, this formula will provided 1920 kcal (2181 kcals w/ prop), 168 g of protein, and 1605 mL of water. This should meet pt's estimated nutrition needs.     Malnutrition Risk: ASPEN criteria not met for malnutrition. Per nutrition screen upon admit, no report of wt loss or poor PO PTA.     Recommendations/Plan:  Initiate Vital HP at 25 mL/hour and advance per protocol to a goal rate of 80 mL/hour  Additional fluids per MD  Monitor weights    RD will follow.            "

## 2023-10-10 NOTE — PROGRESS NOTES
"[facility] Progress Note    Admit Date: 10/4/2023    Resident(s): Gagandeep Tinajero D.O.   Attending:  SWAPNA MAE/ Dr. Ba    Patient ID:    Name:  Balwinder Grimes   YOB: 1950  Age:  72 y.o.  male   MRN:  [id number]    Chief Complaint  Chief Complaint   Patient presents with    Syncope     Pt experienced a syncopal episode hittin ghis R eyebrow on the coffee table. Pt reports increase in dizziness over the last 2 weeks. Pt takes BP medication regularly, initial SBP-98. GCS15        Hospital Course (carried forward and updated):    \"72 y.o. male who presented 10/4/2023 with a past medical history significant for hypertension, hyperlipidemia, and alcohol abuse who was admitted to the hospital on October 4 after a syncopal event resulting in forehead trauma.  He had had increasing dizziness for the last 2 weeks and had accidentally taken a double dose of doxazosin.  He also reported recent discharge from alcohol rehab and reported being sober for 10 days but his admission alcohol level was 0.39.  He was admitted to the [facility] family medicine service for syncope work-up and alcohol withdrawal.  Overnight, patient received 20 mg of Ativan for a CIWA scale up to 26 and this morning when his primary team assessed him, he was found to be obtunded and hypoxic.  He required escalation to 15 L/min facemask and a large amount of oral secretions had to be suctioned out of his mouth.  A chest x-ray showed new onset bilateral lower lobe infiltrates.  A rapid response was called and the patient was administered 0.5 mg of flumazenil by the rapid response team in 2 divided doses with some improvement in his wakeful state.  A point-of-care glucose was 109.  I was consulted at bedside to evaluate the patient for respiratory failure and altered mental status.  He was starting to awaken and follow commands slowly on my exam but was unable to provide any additional history given his current clinical condition.\" By Dr. Gonda " on 10/9    Consultants:  None     Interval Events:    Overnight events: Ampicillin IV Abx ordered for + urine cultures for E faecalis    Reviewed last 24 hour events:  Neuro: RASS -1, intubated  HR: 60-100s, NSR, PVCs  SBP: 110-160s  Tmax: AF  GI: NPO, TF, Last BM 10/10/23  I/O: +1213/-2870/-1656, net -1965  Lines: copeland catheter  Mobility: Level 1  Resp: VD #2, APV-CMV, 20/50%/88/500  AB.541/23.2/60/19.9  CXR: Right larger than left pleural effusion with overlying atelectasis/airspace disease and suspected superimposed pulm edema  Vte: Enoxaparin  PPI/H2: H2 BID  Antibx: Ampicillin (10/9-)  GTT: Propofol 20 mcg/kg/min    WBC 6.9 with no left shift, HgB 12.9 (Baseline 15), MCV ~101.4,   K 3.2, Cr. 0.64, Phosph 1.8  Procal 0.15  Urine Cx (10/8) - E. Faecalis, sensitive to ampicillin, nitrofurantoin, penicillin  Echo (10/10/23): EF 55%, no valvular abnormalities, consider infiltrative cardiomyopathy    -SAT/SBT, wean off vent as tolerated  -Decreased RR from 20->16 for respiratory alkalosis  -Continue diuresis with lasix  -CIWA + Propofol  -Continue ampicillin for UTI      Review of Systems  Review of Systems   Unable to perform ROS: Intubated       Vital Signs for the last 24 hours  Temp:  [36.5 °C (97.7 °F)-37.8 °C (100 °F)] 37.6 °C (99.7 °F)  Pulse:  [] 58  Resp:  [16-53] 16  BP: ()/() 89/52  SpO2:  [92 %-100 %] 97 %    Hemodynamic parameters for the last 24 hours       Vent Settings for the last 24 hours  Vent Mode: APVCMV  Rate (breaths/min): 20  Vt Target (mL): 500  PEEP/CPAP: 8  MAP: 11  Control VTE (exp VT): 491    Physical Exam  Physical Exam  Vitals and nursing note reviewed.   Constitutional:       Appearance: He is not toxic-appearing or diaphoretic.      Comments: Intubated   HENT:      Head: Normocephalic and atraumatic.      Comments: Rash around forehead with yellow indurations     Right Ear: External ear normal.      Left Ear: External ear normal.      Nose: Nose normal.       Mouth/Throat:      Mouth: Mucous membranes are moist.      Pharynx: Oropharynx is clear.   Eyes:      Extraocular Movements: Extraocular movements intact.      Pupils: Pupils are equal, round, and reactive to light.   Cardiovascular:      Rate and Rhythm: Normal rate and regular rhythm.      Pulses: Normal pulses.      Heart sounds: Normal heart sounds.   Pulmonary:      Comments: Mechanical Breath Sounds, ETT tube in place @25 cm  Abdominal:      General: There is no distension.      Palpations: Abdomen is soft.      Tenderness: There is no abdominal tenderness. There is no guarding or rebound.   Genitourinary:     Comments: Maza catheter in place  Musculoskeletal:         General: Normal range of motion.      Cervical back: Normal range of motion.   Skin:     General: Skin is warm.      Capillary Refill: Capillary refill takes less than 2 seconds.   Neurological:      General: No focal deficit present.      Mental Status: He is oriented to person, place, and time.      Comments: RASS +1   Psychiatric:      Comments: Unable to assess         Medications  Current Facility-Administered Medications   Medication Dose Route Frequency Provider Last Rate Last Admin    fentaNYL (Sublimaze) injection 100 mcg  100 mcg Intravenous Q15 MIN PRN Gustavo Ba Jr., D.O.   100 mcg at 10/10/23 0904    And    fentaNYL (Sublimaze) injection 200 mcg  200 mcg Intravenous Q15 MIN PRN NURYS Lau Jr..O.        And    fentaNYL (SUBLIMAZE) 50 mcg/mL in 50mL (Continuous Infusion)   Intravenous Continuous NURYS Lau Jr..O.   Dose not Required at 10/10/23 0700    And    propofol (DIPRIVAN) injection  0-80 mcg/kg/min (Ideal) Intravenous Continuous NURYS Lau Jr..O. 9.9 mL/hr at 10/10/23 1108 20 mcg/kg/min at 10/10/23 1108    potassium phosphate IVPB 30 mmol in 500 mL D5W (premix)  30 mmol Intravenous Once NURYS Lau Jr..O. 83.3 mL/hr at 10/10/23 1108 Rate Verify at 10/10/23 1108    Respiratory Therapy Consult    Nebulization Continuous RT Talia Pope M.D.        LORazepam (Ativan) injection 1-2 mg  1-2 mg Intravenous Q2HRS PRN Jeremy M Gonda, M.D.   2 mg at 10/10/23 0539    furosemide (Lasix) injection 20 mg  20 mg Intravenous BID DIURETIC Jeremy M Gonda, M.D.   20 mg at 10/10/23 0628    famotidine (Pepcid) tablet 20 mg  20 mg Enteral Tube Q12HRS Jeremy M Gonda, M.D.   20 mg at 10/10/23 0617    Or    famotidine (Pepcid) injection 20 mg  20 mg Intravenous Q12HRS Jeremy M Gonda, M.D. MD Alert...ICU Electrolyte Replacement per Pharmacy   Other PHARMACY TO DOSE Jeremy M Gonda, M.D.        lidocaine (Xylocaine) 1 % injection 2 mL  2 mL Tracheal Tube Q30 MIN PRN Jeremy M Gonda, M.D.        Pharmacy Consult: Enteral tube insertion - review meds/change route/product selection  1 Each Other PHARMACY TO DOSE Jeremy M Gonda, M.D.        folic acid (Folvite) tablet 1 mg  1 mg Enteral Tube DAILY Jeremy M Gonda, M.D.   1 mg at 10/10/23 0617    senna-docusate (Pericolace Or Senokot S) 8.6-50 MG per tablet 2 Tablet  2 Tablet Enteral Tube BID Jeremy M Gonda, M.D.   2 Tablet at 10/09/23 1705    And    polyethylene glycol/lytes (Miralax) PACKET 1 Packet  1 Packet Enteral Tube QDAY PRN Jeremy M Gonda, M.D.        And    magnesium hydroxide (Milk Of Magnesia) suspension 30 mL  30 mL Enteral Tube QDAY PRN Jeremy M Gonda, M.D.        And    bisacodyl (Dulcolax) suppository 10 mg  10 mg Rectal QDAY PRN Jeremy M Gonda, M.D.        acetaminophen (Tylenol) tablet 650 mg  650 mg Enteral Tube Q6HRS PRN Jeremy M Gonda, M.D.        ondansetron (Zofran ODT) dispertab 4 mg  4 mg Enteral Tube Q4HRS PRN Jeremy M Gonda, M.D.        DULoxetine (Cymbalta) capsule 30 mg  30 mg Enteral Tube DAILY Jeremy M Gonda, M.D.   30 mg at 10/10/23 0617    rosuvastatin (Crestor) tablet 20 mg  20 mg Enteral Tube Q EVENING Jeremy M Gonda, M.D.   20 mg at 10/09/23 1705    multivitamin tablet 1 Tablet  1 Tablet Enteral Tube DAILY Jeremy M Gonda, M.D.   1 Tablet at  10/10/23 0617    QUEtiapine (SEROquel) tablet 25 mg  25 mg Enteral Tube HS PRN Jeremy M Gonda, M.D.        norepinephrine (Levophed) 8 mg in 250 mL NS infusion (premix)  0-1 mcg/kg/min (Ideal) Intravenous Continuous Gagandeep Tinajero D.O.   Stopped at 10/09/23 2106    ampicillin (Omnipen) 2,000 mg in  mL IVPB  2,000 mg Intravenous Q6HRS Srinivasa Ford M.D.   Stopped at 10/10/23 0639    polymixin-trimethoprim (Polytrim) 29354-9.1 UNIT/ML-% ophthalmic solution 1 Drop  1 Drop Both Eyes 4X/DAY Susan Bradshaw M.D.   1 Drop at 10/10/23 0835    enoxaparin (Lovenox) inj 40 mg  40 mg Subcutaneous DAILY AT 1800 Talia Pope M.D.   40 mg at 10/09/23 1704    ondansetron (Zofran) syringe/vial injection 4 mg  4 mg Intravenous Q4HRS PRN Talia Pope M.D.           Fluids    Intake/Output Summary (Last 24 hours) at 10/10/2023 1250  Last data filed at 10/10/2023 1108  Gross per 24 hour   Intake 1647.55 ml   Output 3670 ml   Net -2022.45 ml       Laboratory  Recent Labs     10/09/23  1048 10/09/23  1423 10/10/23  0328 10/10/23  0730   [id number] 7.18*  --   --   --    [id number] 47.8*  --   --   --    [id number] 112.4*  --   --   --    RDWT3AAG 97.3  --   --   --    ARTHCO3 17  --   --   --    [id number] 15L  15.0*  --   --   --    ARTBE -11*  --   --   --    ISTATAPH  --  7.349*  --   --    ISTATAPCO2  --  32.9  --   --    ISTATAPO2  --  230*  --   --    ISTATATCO2  --  19*  --   --    XEOPOGP3TRK  --  100*  --   --    ISTATARTHCO3  --  18.1  --   --    ISTATARTBE  --  -7*  --   --    ISTATTEMP  --  97.5 F 98.8 F 97.7 F   ISTATFIO2  --  100 50 50   ISTATSPEC  --  Arterial Venous Venous   ISTATAPHTC  --  7.358*  --   --    [id number]  --  227*  --   --      Recent Labs     10/09/23  0031 10/09/23  1048 10/10/23  0425   SODIUM 137 138 138   POTASSIUM 3.6 3.9 3.2*   CHLORIDE 106 107 104   CO2 19* 17* 22   BUN 6* 5* 7*   CREATININE 0.44* 0.50 0.64   MAGNESIUM  --   --  1.7   PHOSPHORUS  --   --  1.8*   CALCIUM 7.9* 8.2* 8.2*      Recent Labs     10/08/23  0051 10/09/23  0031 10/09/23  1048 10/09/23  1509 10/10/23  0425   ALTSGPT 14  --   --   --   --    ASTSGOT 14  --   --   --   --    ALKPHOSPHAT 96  --   --   --   --    TBILIRUBIN 1.0  --   --   --   --    PREALBUMIN  --   --   --  6.7* 5.9*   GLUCOSE 93 98 113*  --  128*     Recent Labs     10/08/23  0051 10/09/23  0031 10/10/23  0425   WBC 4.8 4.7* 6.9   NEUTSPOLYS 58.10  --  69.00   LYMPHOCYTES 16.60*  --  10.00*   MONOCYTES 21.60*  --  18.60*   EOSINOPHILS 2.90  --  1.60   BASOPHILS 0.60  --  0.70   ASTSGOT 14  --   --    ALTSGPT 14  --   --    ALKPHOSPHAT 96  --   --    TBILIRUBIN 1.0  --   --      Recent Labs     10/08/23  0051 10/09/23  0031 10/10/23  0425   RBC 3.43* 3.54* 3.59*   HEMOGLOBIN 12.2* 12.6* 12.9*   HEMATOCRIT 36.3* 36.3* 36.4*   PLATELETCT 115* 123* 164       Imaging  X-Ray:  I have personally reviewed the images and compared with prior images.  EKG:  I have personally reviewed the images and compared with prior images.  CT:    Reviewed  Echo:   Reviewed    ASSESSEMENT and PLAN:    * Respiratory failure requiring intubation (HCC)  Assessment & Plan  Secondary to aspiration pneumonitis, atelectasis. altered mental status, alcohol withdrawal with delirium, hypercarbic respiratory failure, benzodiazepine overdose  Telemetry monitoring   Continuous pulse oximetry   Goal saturation >92%   Monitor ventilator waveforms and titrate flow/peep and volumes according.   Lung protective ventilation strategy w/ A-F bundle   CXR to monitor lung volumes and tube/line placement   VAP bundle prevention, oral care, post pyloric feeding   Head of bed > 30 degree   GI prophylaxis   Daily awakening and SBT trials unless contraindicated   Monitor for liberation   Respiratory treatments: prn   Eventual encouragement of incentive spirometry/mobilization when appropriate  Continue IV Lasix for small pulmonary edema component  Decreased RR from 20->16 for respiratory alkalosis    Aspiration  pneumonitis (HCC)  Assessment & Plan  Secondary to alcohol withdrawal syndrome  Blood cultures, sputum culture, procalcitonin  Hold off on antibiotics for now with low threshold to initiate should he develop fevers or worsening respiratory failure  Ongoing aspiration precautions, SLP evaluation    Alcohol withdrawal syndrome, with delirium (HCC)  Assessment & Plan  Worsening in last 24 hours, admit on 10/6, reported drinks 1-2 bottles of wine  Transferred to ICU for Precedex drip with scheduled and as needed Ativan monitoring RASS  Aspiration and seizure precautions  Continue vitamin supplementation  Eventual alcohol cessation education and resources to be provided when appropriate    Urinary tract infection  Assessment & Plan  Febrile with flank pain  Urine Cx (10/8) - E. Faecalis, sensitive to ampicillin, nitrofurantoin, penicillin    Ampicillin (10/9-) for 5 day course  Consider changing copeland    Syncope and collapse- (present on admission)  Assessment & Plan  Admitted for syncope 10/4, likely alcohol related  Echo (10/10/23): EF 55%, no valvular abnormalities, consider infiltrative cardiomyopathy    Continuous telemetry monitoring  Fall precautions  PT/OT    Primary hypertension- (present on admission)  Assessment & Plan  Continue doxazosin, losartan  Continue Lasix, considering down titrating Doxazosin 4 mg for orthostatic hypotension    Elevated troponin  Assessment & Plan  Secondary to demand ischemia    Macrocytic anemia  Assessment & Plan  Unchanged, no signs of active bleeding  Daily CBC with conservative transfusion strategy    Thrombocytopenia (HCC)  Assessment & Plan  Slowly improving, likely alcohol related  Monitor with daily CBC    Essential tremor  Assessment & Plan  Monitor    Hypokalemia  Assessment & Plan  Replete and monitor closely    Neuropathy- (present on admission)  Assessment & Plan  Continue Cymbalta    Hyperlipidemia- (present on admission)  Assessment & Plan  Continue  rosuvastatin    Benign prostatic hyperplasia with urinary retention- (present on admission)  Assessment & Plan  Continue doxazosin        VTE:  Lovenox  Ulcer: H2 Antagonist  Lines: Maza Catheter  Ongoing indication addressed    I have performed a physical exam and reviewed and updated ROS and Plan today (10/10/2023). In review of yesterday's note (10/9/2023), there are no changes except as documented above.         Gagandeep Tinajero D.O.  PGY-3 Internal Medicine Resident

## 2023-10-10 NOTE — NON-PROVIDER
.Medical Student R ICU Progress Note  For teaching purposes only      Admit Date: 10/4/2023  ICU Day: 1    Student: Panchito Hoffmann, Student  Attending: SWAPNA MAE/ Dr. Ba    Date & Time:   10/10/2023   10:56 AM       Patient ID:    Name:             Balwinder Grimes   YOB: 1950  Age:                 72 y.o.  male   MRN:               [id number]    HPI:  73 y/o M w/ PMH of HTN and BPH on doxazosin admitted to the hospital on 10/4 for syncope workup. Pt was found to be in alcohol withdrawal and placed on a CIWA protocol that included his baseline essential tremor. This lead to over administration of ativan with subsequent respiratory depression and intubation. Pt was brought to the ICU intubated for airway protection.        Consultants:  None    Imaging:  .  DX-CHEST-PORTABLE (1 VIEW)   Final Result      RIGHT larger than LEFT pleural effusion with overlying atelectasis and/or airspace disease and suspected superimposed pulmonary edema, unchanged      DX-ABDOMEN FOR TUBE PLACEMENT   Final Result         Gastric drainage tube with tip projecting over the expected area of the stomach.      DX-ABDOMEN FOR TUBE PLACEMENT   Final Result         Gastric drainage tube coiled in the stomach with tip projecting over the expected area of the distal stomach.      DX-CHEST-PORTABLE (1 VIEW)   Final Result      1.  Likely layering bilateral pleural effusions.   2.  Interstitial infiltrates and/or edema.   3.  Support apparatus as above.      DX-CHEST-PORTABLE (1 VIEW)   Final Result      1.  Pulmonary edema. Multifocal pneumonia could have a similar appearance.   2.  Likely small/moderate bilateral pleural effusions.      DX-CHEST-PORTABLE (1 VIEW)   Final Result      No acute cardiopulmonary abnormality.      CT-CSPINE WITHOUT PLUS RECONS   Final Result      No acute fracture or dislocation of the cervical spine.      CT-HEAD W/O   Final Result      1.  Cerebral atrophy.      2.  White matter lucencies  most consistent with small vessel ischemic change versus demyelination or gliosis.      3.  Otherwise, Head CT without contrast with no acute findings. No evidence of acute cerebral infarction, hemorrhage or mass lesion.         EC-ECHOCARDIOGRAM COMPLETE W/O CONT    (Results Pending)       PHYSICAL EXAM  Gen: NAD, intubated and sedated   HEENT: NC/AT,   Cardiac: RRR, no m/r/g  Respiratory: Intubated with ventilator, symmetrical, CTA b/l.  Ext: No edema  Skin: Warm, dry. No rashes or erythema.       Respiratory:  Vent Mode: APVCMV  Respiration: 16, Pulse Oximetry: 95 %    Chest Tube Drains:    Recent Labs     10/09/23  1048 10/09/23  1423 10/10/23  0328 10/10/23  0730   [id number] 7.18*  --   --   --    YMBQJI033Q 47.8*  --   --   --    WJDKL042R 112.4*  --   --   --    ZGMQ0ECE 97.3  --   --   --    ARTHCO3 17  --   --   --    O1YJQNHAG 15L  15.0*  --   --   --    ARTBE -11*  --   --   --    ISTATAPH  --  7.349*  --   --    ISTATAPCO2  --  32.9  --   --    ISTATAPO2  --  230*  --   --    ISTATATCO2  --  19*  --   --    [id number]  --  100*  --   --    ISTATARTHCO3  --  18.1  --   --    ISTATARTBE  --  -7*  --   --    ISTATTEMP  --  97.5 F 98.8 F 97.7 F   ISTATFIO2  --  100 50 50   ISTATSPEC  --  Arterial Venous Venous   ISTATAPHTC  --  7.358*  --   --    [id number]  --  227*  --   --        HemoDynamics:  Pulse: (!) 57   Blood Pressure : (!) 184/116      Neuro:      Fluids:       Intake/Output Summary (Last 24 hours) at 10/10/2023 1056  Last data filed at 10/10/2023 0639  Gross per 24 hour   Intake 1213.73 ml   Output 2870 ml   Net -1656.27 ml       Weight: 96.6 kg (212 lb 15.4 oz)  Body mass index is 27.33 kg/m².    Recent Labs     10/09/23  0031 10/09/23  1048 10/10/23  0425   SODIUM 137 138 138   POTASSIUM 3.6 3.9 3.2*   CHLORIDE 106 107 104   CO2 19* 17* 22   BUN 6* 5* 7*   CREATININE 0.44* 0.50 0.64   MAGNESIUM  --   --  1.7   PHOSPHORUS  --   --  1.8*   CALCIUM 7.9* 8.2* 8.2*       GI/Nutrition:  Recent Labs      10/08/23  0051 10/09/23  0031 10/09/23  1048 10/09/23  1509 10/10/23  0425   ALTSGPT 14  --   --   --   --    ASTSGOT 14  --   --   --   --    ALKPHOSPHAT 96  --   --   --   --    TBILIRUBIN 1.0  --   --   --   --    PREALBUMIN  --   --   --  6.7* 5.9*   GLUCOSE 93 98 113*  --  128*       Heme:  Recent Labs     10/08/23  0051 10/09/23  0031 10/10/23  0425   RBC 3.43* 3.54* 3.59*   HEMOGLOBIN 12.2* 12.6* 12.9*   HEMATOCRIT 36.3* 36.3* 36.4*   PLATELETCT 115* 123* 164       Infectious Disease:  Temp  Av.3 °C (99.2 °F)  Min: 36.5 °C (97.7 °F)  Max: 37.8 °C (100 °F)  Recent Labs     10/08/23  0051 10/09/23  0031 10/10/23  0425   WBC 4.8 4.7* 6.9   NEUTSPOLYS 58.10  --  69.00   LYMPHOCYTES 16.60*  --  10.00*   MONOCYTES 21.60*  --  18.60*   EOSINOPHILS 2.90  --  1.60   BASOPHILS 0.60  --  0.70   ASTSGOT 14  --   --    ALTSGPT 14  --   --    ALKPHOSPHAT 96  --   --    TBILIRUBIN 1.0  --   --        Meds:   fentaNYL  100 mcg      And    fentaNYL  200 mcg      And    fentaNYL   Stopped (10/10/23 0700)    And    propofol  0-80 mcg/kg/min (Ideal) 20 mcg/kg/min (10/10/23 0702)    potassium phosphate  30 mmol 30 mmol (10/10/23 0834)    magnesium sulfate  4 g 4 g (10/10/23 0834)    Respiratory Therapy Consult        LORazepam  1-2 mg      furosemide  20 mg      famotidine  20 mg      Or    famotidine  20 mg      MD Alert...Adult ICU Electrolyte Replacement per Pharmacy        lidocaine  2 mL      Pharmacy  1 Each      folic acid  1 mg      senna-docusate  2 Tablet      And    polyethylene glycol/lytes  1 Packet      And    magnesium hydroxide  30 mL      And    bisacodyl  10 mg      acetaminophen  650 mg      ondansetron  4 mg      DULoxetine  30 mg      rosuvastatin  20 mg      multivitamin  1 Tablet      QUEtiapine  25 mg      NORepinephrine  0-1 mcg/kg/min (Ideal) Stopped (10/09/23 2106)    ampicillin  2,000 mg Stopped (10/10/23 0639)    polymixin-trimethoprim  1 Drop      enoxaparin (LOVENOX) injection  40 mg       ondansetron  4 mg            Assessment and Plan:  73 y/o M w/ PMH of HTN and BPH on doxazosin admitted to the hospital on 10/4 for syncope workup. Brought to the ICU intubated following high dose Ativan for CIWA protocol.    #Acute hypoxic respiratory failure  Pt CIWA scores were elevated given his hx of essential tremor. This lead to over medicating with Ativan and need for flumazenil. Rapid was called and pt was intubated due to decreased respiratory drive and hypoxia.    -Appropriate to decrease sedation and attempt extubation given probable metabolism of Ativan.  -ABG this AM shows respiratory alkalosis with a pH of 7.541, pCO2 23.2, and bicarb 19.9.    #Alcohol Withdrawal  Prior CIWA not appropriate in an intubated/sedated patient. Goal RASS 0 to -2 with the administration of benzodiazepine bolus when symptoms present. Currently on propofol for sedation in the setting of intubation with fentanyl pushes PRN.   Transition to standard CIWA protocol with Ativan with the option to add barbiturates if the withdrawal becomes unmanageable with benzodiazepines alone.  Consider repeating a librium taper?     #Syncope  Pt had a reported episode of syncope with head injury prior to arrival in the hospital. Head CT was negative and per chart review, the most likely etiology of his syncope was orthostatic hypotension vs alcohol intoxication. Per pt's son, pt has been drinking heavily daily and has not been eating or drinking well.     Orthostatic blood pressure  Cardiac echo was completed and shows normal left and right ventricular systolic function with some bilateral ventricular hypertrophy. No valvular abnormalities or doppler.   Continue fluid administration    Fever  Pt had a UA with culture secondary to flank pain and observed fever. Cultures grew enterococcus faecalis with multiple susceptibilities.    Pt started on ampicillin       CODE STATUS: Full Code  DISPO: Continued ICU admission

## 2023-10-10 NOTE — RESPIRATORY CARE
Extubation    Cuff leak noted yes  Stridor present no     FiO2%: 50 % (10/10/23 1049)  O2 (LPM): 5 (10/10/23 1530)     Patient toleration well  RCP Complete? yes  Events/Summary/Plan: extubation (10/10/23 1530)

## 2023-10-10 NOTE — CARE PLAN
The patient is Watcher - Medium risk of patient condition declining or worsening    Shift Goals  Clinical Goals: RASS -1 to +1, mobilize, start tube feeds, hemodynamic stability.  Patient Goals: MAXX.  Family Goals: updates on POC    Progress made toward(s) clinical / shift goals:  Pt mobilized to EOB. Tube feeds initiated. Norepinephrine titrated off at beginning of shift. Pt tolerated well, remained hemodynamically stable for remainder of shift.     Problem: Fall Risk  Goal: Patient will remain free from falls  Outcome: Progressing  Note: Bed alarm on.     Problem: Skin Integrity  Goal: Skin integrity is maintained or improved  Outcome: Progressing  Note: Left forearm dressing changed per order. Q2 turns in place.      Problem: Safety - Medical Restraint  Goal: Remains free of injury from restraints (Restraint for Interference with Medical Device)  Outcome: Progressing  Flowsheets (Taken 10/10/2023 0350)  Addressed this shift: Remains free of injury from restraints (restraint for interference with medical device):   Determine that other, less restrictive measures have been tried or would not be effective before applying the restraint   Evaluate the patient's condition at the time of restraint application   Inform patient/family regarding the reason for restraint   Every 2 hours: Monitor safety, psychosocial status, comfort, nutrition and hydration     Problem: Pain - Standard  Goal: Alleviation of pain or a reduction in pain to the patient’s comfort goal  Outcome: Progressing  Note:  mcg fentanyl administered when pt is CPOT > 2 and/or RASS > +1.     Patient is not progressing towards the following goals:    Problem: Psychosocial  Goal: Patient's level of anxiety will decrease  Outcome: Not Progressing  Note: Pt experiencing acute episodes of RASS +3.

## 2023-10-10 NOTE — FLOWSHEET NOTE
10/10/23 1530   Weaning Parameters   RR (bpm) 18   $ FVC / Vital Capacity (liters)  800   NIF (cm H2O)  -21   Rapid Shallow Breathing Index (RR/VT) 37   Spontaneous VE 7.8   Spontaneous      MD carlos to extubate

## 2023-10-10 NOTE — PROGRESS NOTES
"[facility] Team Attending Note  (see full Resident note in EPIC)    I have seen and examined the patient today.  I have reviewed the medical record, laboratory data, imaging, and all relevant studies.  I have discussed the assessment and plan of care with the Internal Medicine Resident and agree with their note and plan as documented.       Date of service: 10/10/2023    Hospital Course:  \"72 y.o. male who presented 10/4/2023 with a past medical history significant for hypertension, hyperlipidemia, and alcohol abuse who was admitted to the hospital on October 4 after a syncopal event resulting in forehead trauma.  He had had increasing dizziness for the last 2 weeks and had accidentally taken a double dose of doxazosin.  He also reported recent discharge from alcohol rehab and reported being sober for 10 days but his admission alcohol level was 0.39.  He was admitted to the [facility] family medicine service for syncope work-up and alcohol withdrawal.  Overnight, patient received 20 mg of Ativan for a CIWA scale up to 26 and this morning when his primary team assessed him, he was found to be obtunded and hypoxic.  He required escalation to 15 L/min facemask and a large amount of oral secretions had to be suctioned out of his mouth.  A chest x-ray showed new onset bilateral lower lobe infiltrates.  A rapid response was called and the patient was administered 0.5 mg of flumazenil by the rapid response team in 2 divided doses with some improvement in his wakeful state.  A point-of-care glucose was 109.  I was consulted at bedside to evaluate the patient for respiratory failure and altered mental status.  He was starting to awaken and follow commands slowly on my exam but was unable to provide any additional history given his current clinical condition.\"    Interval events:   - Agitated overnight   - Neuro: RASS -3 to +3   - HR: 50s-100s   - SBP: 100s-140s, off NE   - GI: Start TF today   - UOP: 2.9 L/ 24 hrs   - Shaunna: yes   - " "Tm: 37.8   - Lines: PIV   - PPx: GI pepcid, DVT lovenox   - ABG: Respiratory alkalosis, hypoxia   - CMV 16/500/8/0.5   - CXR (personally reviewed and compared to prior): right effusion   - Mobility level: 2, eligible for progression: yes   - Kphos, Mag   - precedex -> propofol   - Trial SAT/SBT this afternoon    Physical Exam:  /64   Pulse 74   Temp 37.5 °C (99.5 °F) (Temporal)   Resp 19   Ht 1.88 m (6' 2.02\")   Wt 96.6 kg (212 lb 15.4 oz)   SpO2 95%   BMI 27.33 kg/m²   Gen: WN, WD, ill appearing  HEENT: NC, AT, ETT in place  CARD: RRR  PULM: rhonchi B/L  ABD: soft, NT, ND  EXT: No C/C/E  NEURO: sedated  PSYCH: unable to assess    Assessment/Plan:  * Respiratory failure requiring intubation (HCC)  Assessment & Plan  Secondary to aspiration pneumonitis, atelectasis. altered mental status, alcohol withdrawal with delirium, hypercarbic respiratory failure, benzodiazepine overdose  Intubated 10/9  RT/O2 protocols  Daily and PRN ABGs  Titration of ventilator therapy based on ABGs and patient's status  Sedation as tolerated/indicated  Daily CXR  HOB >30 degrees and peridex for VAP prevention  Pepcid for GI prophylaxis  SAT/SBT when able (ABCDEF Bundle)  Early mobility  Incentive spirometry/mobilization when appropriate  Continue IV Lasix for small pulmonary edema component  Decreased RR from 20->16 for respiratory alkalosis    Urinary tract infection  Assessment & Plan  Febrile with flank pain  Urine Cx (10/8) - E. Faecalis, sensitive to ampicillin, nitrofurantoin, penicillin  Ampicillin (10/9-) for 5 day course    Aspiration pneumonitis (HCC)  Assessment & Plan  Secondary to alcohol withdrawal syndrome  Blood cultures, sputum culture, procalcitonin  Hold off on antibiotics for now with low threshold to broaden abx should he develop fevers or worsening respiratory failure  Ongoing aspiration precautions, SLP evaluation    Elevated troponin  Assessment & Plan  Secondary to demand ischemia    Macrocytic " anemia  Assessment & Plan  Unchanged, no signs of active bleeding  Daily CBC with conservative transfusion strategy    Thrombocytopenia (HCC)  Assessment & Plan  Slowly improving, likely alcohol related  Monitor with daily CBC    Essential tremor  Assessment & Plan  Monitor    Hypokalemia  Assessment & Plan  Replete and monitor closely    Alcohol withdrawal syndrome, with delirium (HCC)  Assessment & Plan  Worsening in last 24 hours, admit on 10/6, reported drinks 1-2 bottles of wine  Transferred to ICU for Precedex drip with scheduled and as needed Ativan monitoring RASS  Aspiration and seizure precautions  Continue vitamin supplementation  Eventual alcohol cessation education and resources to be provided when appropriate    Syncope and collapse- (present on admission)  Assessment & Plan  Admitted for syncope 10/4, likely alcohol related  Echo 10/10/23: EF 55%, no valvular abnormalities, consider infiltrative cardiomyopathy  Continuous telemetry monitoring  Fall precautions  PT/OT    Neuropathy- (present on admission)  Assessment & Plan  Continue Cymbalta    Hyperlipidemia- (present on admission)  Assessment & Plan  Continue rosuvastatin    Benign prostatic hyperplasia with urinary retention- (present on admission)  Assessment & Plan  Continue doxazosin  Maza in place    Primary hypertension- (present on admission)  Assessment & Plan  Continue doxazosin, losartan  Continue Lasix, considering down titrating Doxazosin 4 mg for orthostatic hypotension    Discussed patient condition and risk of morbidity and/or mortality with RN, RT, Pharmacy, [facility] resident, Code status disscussed, Charge nurse / hot rounds, and Patient.      The patient remains critically ill.  Critical care time = 51 minutes in directly providing and coordinating critical care and extensive data review.  No time overlap and excludes procedures    Please note that this dictation was created using voice recognition software. I have made every  reasonable attempt to correct obvious errors, but I expect that there are errors of grammar and possibly content that I did not discover before finalizing the note.

## 2023-10-11 ENCOUNTER — APPOINTMENT (OUTPATIENT)
Dept: RADIOLOGY | Facility: MEDICAL CENTER | Age: 73
DRG: 896 | End: 2023-10-11
Attending: INTERNAL MEDICINE
Payer: MEDICARE

## 2023-10-11 LAB
ANION GAP SERPL CALC-SCNC: 11 MMOL/L (ref 7–16)
ANION GAP SERPL CALC-SCNC: 13 MMOL/L (ref 7–16)
BACTERIA SPEC RESP CULT: NORMAL
BASOPHILS # BLD AUTO: 0.9 % (ref 0–1.8)
BASOPHILS # BLD: 0.06 K/UL (ref 0–0.12)
BUN SERPL-MCNC: 6 MG/DL (ref 8–22)
BUN SERPL-MCNC: 6 MG/DL (ref 8–22)
CALCIUM SERPL-MCNC: 7.7 MG/DL (ref 8.5–10.5)
CALCIUM SERPL-MCNC: 8 MG/DL (ref 8.5–10.5)
CHLORIDE SERPL-SCNC: 100 MMOL/L (ref 96–112)
CHLORIDE SERPL-SCNC: 104 MMOL/L (ref 96–112)
CO2 SERPL-SCNC: 24 MMOL/L (ref 20–33)
CO2 SERPL-SCNC: 28 MMOL/L (ref 20–33)
CREAT SERPL-MCNC: 0.56 MG/DL (ref 0.5–1.4)
CREAT SERPL-MCNC: 0.57 MG/DL (ref 0.5–1.4)
CRP SERPL HS-MCNC: 14.83 MG/DL (ref 0–0.75)
EOSINOPHIL # BLD AUTO: 0.19 K/UL (ref 0–0.51)
EOSINOPHIL NFR BLD: 2.8 % (ref 0–6.9)
ERYTHROCYTE [DISTWIDTH] IN BLOOD BY AUTOMATED COUNT: 43.8 FL (ref 35.9–50)
GFR SERPLBLD CREATININE-BSD FMLA CKD-EPI: 104 ML/MIN/1.73 M 2
GFR SERPLBLD CREATININE-BSD FMLA CKD-EPI: 104 ML/MIN/1.73 M 2
GLUCOSE SERPL-MCNC: 116 MG/DL (ref 65–99)
GLUCOSE SERPL-MCNC: 121 MG/DL (ref 65–99)
GRAM STN SPEC: NORMAL
HCT VFR BLD AUTO: 33.8 % (ref 42–52)
HGB BLD-MCNC: 11.6 G/DL (ref 14–18)
IMM GRANULOCYTES # BLD AUTO: 0.01 K/UL (ref 0–0.11)
IMM GRANULOCYTES NFR BLD AUTO: 0.1 % (ref 0–0.9)
LYMPHOCYTES # BLD AUTO: 0.88 K/UL (ref 1–4.8)
LYMPHOCYTES NFR BLD: 12.9 % (ref 22–41)
MAGNESIUM SERPL-MCNC: 2.2 MG/DL (ref 1.5–2.5)
MCH RBC QN AUTO: 35.4 PG (ref 27–33)
MCHC RBC AUTO-ENTMCNC: 34.3 G/DL (ref 32.3–36.5)
MCV RBC AUTO: 103 FL (ref 81.4–97.8)
MONOCYTES # BLD AUTO: 1.51 K/UL (ref 0–0.85)
MONOCYTES NFR BLD AUTO: 22.1 % (ref 0–13.4)
NEUTROPHILS # BLD AUTO: 4.18 K/UL (ref 1.82–7.42)
NEUTROPHILS NFR BLD: 61.2 % (ref 44–72)
NRBC # BLD AUTO: 0 K/UL
NRBC BLD-RTO: 0 /100 WBC (ref 0–0.2)
PHOSPHATE SERPL-MCNC: 3.3 MG/DL (ref 2.5–4.5)
PLATELET # BLD AUTO: 171 K/UL (ref 164–446)
PMV BLD AUTO: 10.5 FL (ref 9–12.9)
POTASSIUM SERPL-SCNC: 3 MMOL/L (ref 3.6–5.5)
POTASSIUM SERPL-SCNC: 3.1 MMOL/L (ref 3.6–5.5)
PREALB SERPL-MCNC: 4.9 MG/DL (ref 18–38)
RBC # BLD AUTO: 3.28 M/UL (ref 4.7–6.1)
SIGNIFICANT IND 70042: NORMAL
SITE SITE: NORMAL
SODIUM SERPL-SCNC: 139 MMOL/L (ref 135–145)
SODIUM SERPL-SCNC: 141 MMOL/L (ref 135–145)
SOURCE SOURCE: NORMAL
WBC # BLD AUTO: 6.8 K/UL (ref 4.8–10.8)

## 2023-10-11 PROCEDURE — 80048 BASIC METABOLIC PNL TOTAL CA: CPT

## 2023-10-11 PROCEDURE — 71045 X-RAY EXAM CHEST 1 VIEW: CPT

## 2023-10-11 PROCEDURE — 770000 HCHG ROOM/CARE - INTERMEDIATE ICU *

## 2023-10-11 PROCEDURE — 700111 HCHG RX REV CODE 636 W/ 250 OVERRIDE (IP): Mod: JZ | Performed by: STUDENT IN AN ORGANIZED HEALTH CARE EDUCATION/TRAINING PROGRAM

## 2023-10-11 PROCEDURE — 86140 C-REACTIVE PROTEIN: CPT

## 2023-10-11 PROCEDURE — 99222 1ST HOSP IP/OBS MODERATE 55: CPT | Performed by: HOSPITALIST

## 2023-10-11 PROCEDURE — 99233 SBSQ HOSP IP/OBS HIGH 50: CPT | Mod: GC | Performed by: INTERNAL MEDICINE

## 2023-10-11 PROCEDURE — 31720 CLEARANCE OF AIRWAYS: CPT

## 2023-10-11 PROCEDURE — 92610 EVALUATE SWALLOWING FUNCTION: CPT

## 2023-10-11 PROCEDURE — 700111 HCHG RX REV CODE 636 W/ 250 OVERRIDE (IP): Mod: JZ | Performed by: INTERNAL MEDICINE

## 2023-10-11 PROCEDURE — 700105 HCHG RX REV CODE 258: Performed by: HOSPITALIST

## 2023-10-11 PROCEDURE — 700111 HCHG RX REV CODE 636 W/ 250 OVERRIDE (IP): Performed by: STUDENT IN AN ORGANIZED HEALTH CARE EDUCATION/TRAINING PROGRAM

## 2023-10-11 PROCEDURE — 84134 ASSAY OF PREALBUMIN: CPT

## 2023-10-11 PROCEDURE — 85025 COMPLETE CBC W/AUTO DIFF WBC: CPT

## 2023-10-11 PROCEDURE — 83735 ASSAY OF MAGNESIUM: CPT

## 2023-10-11 PROCEDURE — 99152 MOD SED SAME PHYS/QHP 5/>YRS: CPT

## 2023-10-11 PROCEDURE — 700111 HCHG RX REV CODE 636 W/ 250 OVERRIDE (IP): Performed by: INTERNAL MEDICINE

## 2023-10-11 PROCEDURE — 700111 HCHG RX REV CODE 636 W/ 250 OVERRIDE (IP): Performed by: HOSPITALIST

## 2023-10-11 PROCEDURE — 84100 ASSAY OF PHOSPHORUS: CPT

## 2023-10-11 PROCEDURE — 700105 HCHG RX REV CODE 258: Performed by: STUDENT IN AN ORGANIZED HEALTH CARE EDUCATION/TRAINING PROGRAM

## 2023-10-11 RX ORDER — ONDANSETRON 4 MG/1
4 TABLET, ORALLY DISINTEGRATING ORAL EVERY 4 HOURS PRN
Status: DISCONTINUED | OUTPATIENT
Start: 2023-10-11 | End: 2023-10-13

## 2023-10-11 RX ORDER — POTASSIUM CHLORIDE 29.8 MG/ML
40 INJECTION INTRAVENOUS ONCE
Status: DISCONTINUED | OUTPATIENT
Start: 2023-10-11 | End: 2023-10-11

## 2023-10-11 RX ORDER — AMOXICILLIN 250 MG
2 CAPSULE ORAL 2 TIMES DAILY
Status: DISCONTINUED | OUTPATIENT
Start: 2023-10-11 | End: 2023-10-13

## 2023-10-11 RX ORDER — LOSARTAN POTASSIUM 50 MG/1
50 TABLET ORAL DAILY
Status: DISCONTINUED | OUTPATIENT
Start: 2023-10-11 | End: 2023-10-13

## 2023-10-11 RX ORDER — BISACODYL 10 MG
10 SUPPOSITORY, RECTAL RECTAL
Status: DISCONTINUED | OUTPATIENT
Start: 2023-10-11 | End: 2023-10-13

## 2023-10-11 RX ORDER — POTASSIUM CHLORIDE 20 MEQ/1
40 TABLET, EXTENDED RELEASE ORAL 2 TIMES DAILY
Status: DISCONTINUED | OUTPATIENT
Start: 2023-10-11 | End: 2023-10-11

## 2023-10-11 RX ORDER — QUETIAPINE FUMARATE 25 MG/1
25 TABLET, FILM COATED ORAL NIGHTLY PRN
Status: DISCONTINUED | OUTPATIENT
Start: 2023-10-11 | End: 2023-10-16

## 2023-10-11 RX ORDER — ACETAMINOPHEN 325 MG/1
650 TABLET ORAL EVERY 6 HOURS PRN
Status: DISCONTINUED | OUTPATIENT
Start: 2023-10-11 | End: 2023-10-13

## 2023-10-11 RX ORDER — HYDRALAZINE HYDROCHLORIDE 20 MG/ML
20 INJECTION INTRAMUSCULAR; INTRAVENOUS EVERY 6 HOURS PRN
Status: DISCONTINUED | OUTPATIENT
Start: 2023-10-11 | End: 2023-10-20 | Stop reason: HOSPADM

## 2023-10-11 RX ORDER — FAMOTIDINE 20 MG/1
20 TABLET, FILM COATED ORAL EVERY 12 HOURS
Status: DISCONTINUED | OUTPATIENT
Start: 2023-10-11 | End: 2023-10-11

## 2023-10-11 RX ORDER — POTASSIUM CHLORIDE 7.45 MG/ML
10 INJECTION INTRAVENOUS
Status: DISCONTINUED | OUTPATIENT
Start: 2023-10-11 | End: 2023-10-11

## 2023-10-11 RX ORDER — ROSUVASTATIN CALCIUM 10 MG/1
20 TABLET, COATED ORAL EVERY EVENING
Status: DISCONTINUED | OUTPATIENT
Start: 2023-10-11 | End: 2023-10-11

## 2023-10-11 RX ORDER — HALOPERIDOL 5 MG/ML
1 INJECTION INTRAMUSCULAR EVERY 6 HOURS PRN
Status: DISCONTINUED | OUTPATIENT
Start: 2023-10-11 | End: 2023-10-12

## 2023-10-11 RX ORDER — POLYETHYLENE GLYCOL 3350 17 G/17G
1 POWDER, FOR SOLUTION ORAL
Status: DISCONTINUED | OUTPATIENT
Start: 2023-10-11 | End: 2023-10-13

## 2023-10-11 RX ORDER — ROSUVASTATIN CALCIUM 20 MG/1
20 TABLET, COATED ORAL EVERY EVENING
Status: DISCONTINUED | OUTPATIENT
Start: 2023-10-11 | End: 2023-10-13

## 2023-10-11 RX ORDER — FOLIC ACID 1 MG/1
1 TABLET ORAL DAILY
Status: DISCONTINUED | OUTPATIENT
Start: 2023-10-11 | End: 2023-10-13

## 2023-10-11 RX ORDER — POTASSIUM CHLORIDE 7.45 MG/ML
10 INJECTION INTRAVENOUS
Status: COMPLETED | OUTPATIENT
Start: 2023-10-11 | End: 2023-10-11

## 2023-10-11 RX ORDER — DULOXETIN HYDROCHLORIDE 30 MG/1
30 CAPSULE, DELAYED RELEASE ORAL DAILY
Status: DISCONTINUED | OUTPATIENT
Start: 2023-10-11 | End: 2023-10-13

## 2023-10-11 RX ORDER — FUROSEMIDE 10 MG/ML
20 INJECTION INTRAMUSCULAR; INTRAVENOUS
Status: DISCONTINUED | OUTPATIENT
Start: 2023-10-11 | End: 2023-10-20 | Stop reason: HOSPADM

## 2023-10-11 RX ADMIN — ENOXAPARIN SODIUM 40 MG: 100 INJECTION SUBCUTANEOUS at 17:10

## 2023-10-11 RX ADMIN — POTASSIUM CHLORIDE 10 MEQ: 7.46 INJECTION, SOLUTION INTRAVENOUS at 09:25

## 2023-10-11 RX ADMIN — FAMOTIDINE 20 MG: 10 INJECTION, SOLUTION INTRAVENOUS at 05:24

## 2023-10-11 RX ADMIN — AMPICILLIN AND SULBACTAM 3 G: 1; 2 INJECTION, POWDER, FOR SOLUTION INTRAMUSCULAR; INTRAVENOUS at 11:23

## 2023-10-11 RX ADMIN — POLYMYXIN B SULFATE AND TRIMETHOPRIM 1 DROP: 10000; 1 SOLUTION OPHTHALMIC at 17:04

## 2023-10-11 RX ADMIN — HYDRALAZINE HYDROCHLORIDE 20 MG: 20 INJECTION, SOLUTION INTRAMUSCULAR; INTRAVENOUS at 11:16

## 2023-10-11 RX ADMIN — POTASSIUM CHLORIDE 10 MEQ: 7.46 INJECTION, SOLUTION INTRAVENOUS at 12:23

## 2023-10-11 RX ADMIN — AMPICILLIN AND SULBACTAM 3 G: 1; 2 INJECTION, POWDER, FOR SOLUTION INTRAMUSCULAR; INTRAVENOUS at 17:15

## 2023-10-11 RX ADMIN — FUROSEMIDE 20 MG: 10 INJECTION, SOLUTION INTRAVENOUS at 17:08

## 2023-10-11 RX ADMIN — HALOPERIDOL LACTATE 1 MG: 5 INJECTION, SOLUTION INTRAMUSCULAR at 22:10

## 2023-10-11 RX ADMIN — AMPICILLIN SODIUM 2000 MG: 2 INJECTION, POWDER, FOR SOLUTION INTRAVENOUS at 05:29

## 2023-10-11 RX ADMIN — POLYMYXIN B SULFATE AND TRIMETHOPRIM 1 DROP: 10000; 1 SOLUTION OPHTHALMIC at 08:14

## 2023-10-11 RX ADMIN — FUROSEMIDE 20 MG: 10 INJECTION, SOLUTION INTRAVENOUS at 11:15

## 2023-10-11 RX ADMIN — POTASSIUM CHLORIDE 10 MEQ: 7.46 INJECTION, SOLUTION INTRAVENOUS at 10:42

## 2023-10-11 RX ADMIN — FUROSEMIDE 20 MG: 10 INJECTION, SOLUTION INTRAVENOUS at 05:24

## 2023-10-11 RX ADMIN — POTASSIUM CHLORIDE 10 MEQ: 7.46 INJECTION, SOLUTION INTRAVENOUS at 08:13

## 2023-10-11 ASSESSMENT — PAIN DESCRIPTION - PAIN TYPE
TYPE: ACUTE PAIN

## 2023-10-11 ASSESSMENT — FIBROSIS 4 INDEX
FIB4 SCORE: 1.58
FIB4 SCORE: 1.58

## 2023-10-11 NOTE — ASSESSMENT & PLAN NOTE
Admitted for syncope 10/4, likely alcohol related  Echo (10/10/23): EF 55%, no valvular abnormalities  Telemetry and EKG have been unremarkable in this regard  Patient has not had any further symptoms

## 2023-10-11 NOTE — PROGRESS NOTES
Monitor summary:    Normal sinus rhythm with rates from the 60s to 90s  Frequent multifocal PVCs  .17/.08/.39

## 2023-10-11 NOTE — ASSESSMENT & PLAN NOTE
Pt with hemoglobin of 12.7 and MCV at 104.5 on admission; Likely due to heavy alcohol use.   - Continue to monitor CBC    - Continue multivitamin and thiamine supplement and folate

## 2023-10-11 NOTE — ASSESSMENT & PLAN NOTE
Admitted on 10/6, reported drinks 1-2 bottles of wine  Initially admitted on a CIWA protocol, subsequently went to the ICU and required intubation but now is through his DTs.    Continue to monitor under [facility] protocol    I am in daily

## 2023-10-11 NOTE — PROGRESS NOTES
"[facility] ICU Progress Note    Admit Date: 10/4/2023    Resident(s): Gagandeep Tinajero D.O.   Attending:  SWAPNA MAE/ Dr. Ba    Patient ID:    Name:  Balwinder Grimes   YOB: 1950  Age:  72 y.o.  male   MRN:  [id number]    Chief Complaint  Chief Complaint   Patient presents with    Syncope     Pt experienced a syncopal episode hittin ghis R eyebrow on the coffee table. Pt reports increase in dizziness over the last 2 weeks. Pt takes BP medication regularly, initial SBP-98. GCS15        Hospital Course (carried forward and updated):    \"72 y.o. male who presented 10/4/2023 with a past medical history significant for hypertension, hyperlipidemia, and alcohol abuse who was admitted to the hospital on October 4 after a syncopal event resulting in forehead trauma.  He had had increasing dizziness for the last 2 weeks and had accidentally taken a double dose of doxazosin.  He also reported recent discharge from alcohol rehab and reported being sober for 10 days but his admission alcohol level was 0.39.  He was admitted to the [facility] family medicine service for syncope work-up and alcohol withdrawal.  Overnight, patient received 20 mg of Ativan for a CIWA scale up to 26 and this morning when his primary team assessed him, he was found to be obtunded and hypoxic.  He required escalation to 15 L/min facemask and a large amount of oral secretions had to be suctioned out of his mouth.  A chest x-ray showed new onset bilateral lower lobe infiltrates.  A rapid response was called and the patient was administered 0.5 mg of flumazenil by the rapid response team in 2 divided doses with some improvement in his wakeful state.  A point-of-care glucose was 109.  I was consulted at bedside to evaluate the patient for respiratory failure and altered mental status.  He was starting to awaken and follow commands slowly on my exam but was unable to provide any additional history given his current clinical condition.\" By Dr. Gonda " on 10/9    Consultants:  None     Interval Events:    ALISA, visited by sister yesterday, states he has been decompensating and falling with increasing alcohol intake, wife with dementia managing fine by herself at home    Reviewed last 24 hour events:  Neuro: A&Ox4  HR: 60-90s, NSR, PVCs  SBP: 110-150s  Tmax: AF  GI: NPO, failed bedside swallow, TF, Last BM 10/10/23  I/O: +996/-3300/-2303, net -4269  Lines: copeland catheter (placed 10/8 due to straight cath x2)  Mobility: Level 2  Resp: 96% on 5L NC  CXR: Right larger than left pleural effusion with overlying atelectasis/airspace disease and suspected superimposed pulm edema, increased from prior  Vte: Enoxaparin  PPI/H2: H2 BID  Antibx: Ampicillin (10/9-)  GTT: Propofol 20 mcg/kg/min->OFF    WBC 6.9->6.8, HgB 12.9->11.6 (Baseline 15), MCV ~101.4,   K 3.1, Cr. 0.64->0.56, Phosph 1.8  Echo (10/10/23): EF 55%, no valvular abnormalities, consider infiltrative cardiomyopathy    -Potassium repletion  -Pending SLP evaluation  -Continue diuresis with lasix for potential HFpEF  -Pulmonary hygiene, percussion therapy  -Continue ampicillin for UTI x 5 days  -Delirium precautions  [facility]    Overnight events: Ampicillin IV Abx ordered for + urine cultures for E faecalis    Reviewed last 24 hour events:  Neuro: RASS -1, intubated  HR: 60-100s, NSR, PVCs  SBP: 110-160s  Tmax: AF  GI: NPO, TF, Last BM 10/10/23  I/O: +1213/-2870/-1656, net -1965  Lines: copeland catheter  Mobility: Level 1  Resp: VD #2, APV-CMV, 20/50%/88/500  AB.541/23.2/60/19.9  CXR: Right larger than left pleural effusion with overlying atelectasis/airspace disease and suspected superimposed pulm edema  Vte: Enoxaparin  PPI/H2: H2 BID  Antibx: Ampicillin (10/9-)  GTT: Propofol 20 mcg/kg/min    WBC 6.9 with no left shift, HgB 12.9 (Baseline 15), MCV ~101.4,   K 3.2, Cr. 0.64, Phosph 1.8  Procal 0.15  Urine Cx (10/8) - E. Faecalis, sensitive to ampicillin, nitrofurantoin, penicillin  Echo  (10/10/23): EF 55%, no valvular abnormalities, consider infiltrative cardiomyopathy    -SAT/SBT, wean off vent as tolerated  -Decreased RR from 20->16 for respiratory alkalosis  -Continue diuresis with lasix  -CIWA + Propofol  -Continue ampicillin for UTI      Review of Systems  Review of Systems   Unable to perform ROS: Acuity of condition       Vital Signs for the last 24 hours  Temp:  [36.7 °C (98 °F)-37.2 °C (99 °F)] 36.7 °C (98 °F)  Pulse:  [] 99  Resp:  [19-56] 26  BP: (116-176)/(60-92) 127/71  SpO2:  [90 %-98 %] 92 %    Hemodynamic parameters for the last 24 hours       Vent Settings for the last 24 hours       Physical Exam  Physical Exam  Vitals and nursing note reviewed.   Constitutional:       Appearance: He is not toxic-appearing or diaphoretic.   HENT:      Head: Normocephalic and atraumatic.      Comments: Rash around forehead with yellow indurations     Right Ear: External ear normal.      Left Ear: External ear normal.      Nose: Nose normal.      Mouth/Throat:      Mouth: Mucous membranes are moist.      Pharynx: Oropharynx is clear.   Eyes:      Extraocular Movements: Extraocular movements intact.      Pupils: Pupils are equal, round, and reactive to light.   Cardiovascular:      Rate and Rhythm: Normal rate and regular rhythm.      Pulses: Normal pulses.      Heart sounds: Normal heart sounds.   Pulmonary:      Breath sounds: Rhonchi and rales present.   Abdominal:      General: There is no distension.      Palpations: Abdomen is soft.      Tenderness: There is no abdominal tenderness. There is no guarding or rebound.   Genitourinary:     Comments: Maza catheter in place  Musculoskeletal:         General: Normal range of motion.      Cervical back: Normal range of motion.   Skin:     General: Skin is warm.      Capillary Refill: Capillary refill takes less than 2 seconds.   Neurological:      Comments: A&O x1         Medications  Current Facility-Administered Medications   Medication Dose  Route Frequency Provider Last Rate Last Admin    DULoxetine (Cymbalta) capsule 30 mg  30 mg Oral DAILY Gustavo Ba Jr., NURYS.O.        folic acid (Folvite) tablet 1 mg  1 mg Oral DAILY NURYS Lau Jr..O.        multivitamin tablet 1 Tablet  1 Tablet Oral DAILY NURYS Lau Jr..O.        senna-docusate (Pericolace Or Senokot S) 8.6-50 MG per tablet 2 Tablet  2 Tablet Oral BID Gustavo Ba Jr., NURYS.O.        And    polyethylene glycol/lytes (Miralax) PACKET 1 Packet  1 Packet Oral QDAY PRN Gustavo Ba Jr., D.O.        And    magnesium hydroxide (Milk Of Magnesia) suspension 30 mL  30 mL Oral QDAY PRN NURYS Lau Jr..O.        And    bisacodyl (Dulcolax) suppository 10 mg  10 mg Rectal QDAY PRN NURYS Lau Jr..O.        acetaminophen (Tylenol) tablet 650 mg  650 mg Oral Q6HRS PRN Gustavo Ba Jr., NURYS.O.        ondansetron (Zofran ODT) dispertab 4 mg  4 mg Oral Q4HRS PRN NURYS Lau Jr..O.        QUEtiapine (SEROquel) tablet 25 mg  25 mg Enteral Tube HS PRN NURYS Lau Jr..O.        losartan (Cozaar) tablet 50 mg  50 mg Oral DAILY NURYS Lau Jr..O.        rosuvastatin (Crestor) tablet 20 mg  20 mg Oral Q EVENING NURYS Lau Jr..O.        furosemide (Lasix) injection 20 mg  20 mg Intravenous BID DIURETIC Gustavo Ba Jr., D.O.   20 mg at 10/11/23 1708    hydrALAZINE (Apresoline) injection 20 mg  20 mg Intravenous Q6HRS PRN Edward Mendez M.D.   20 mg at 10/11/23 1116    ampicillin/sulbactam (Unasyn) 3 g in  mL IVPB  3 g Intravenous Q6HRS Edward Mendez M.D.   Stopped at 10/11/23 1745    haloperidol lactate (Haldol) injection 1 mg  1 mg Intravenous Q6HRS PRN Octaviano Johns M.D.        Respiratory Therapy Consult   Nebulization Continuous RT Talia Pope M.D.        MD Alert...ICU Electrolyte Replacement per Pharmacy   Other PHARMACY TO DOSE Jeremy M Gonda, M.D.        polymixin-trimethoprim (Polytrim) 05981-1.1 UNIT/ML-% ophthalmic  solution 1 Drop  1 Drop Both Eyes 4X/DAY Susan Bradshaw M.D.   1 Drop at 10/11/23 1704    enoxaparin (Lovenox) inj 40 mg  40 mg Subcutaneous DAILY AT 1800 Talia Pope M.D.   40 mg at 10/11/23 1710    ondansetron (Zofran) syringe/vial injection 4 mg  4 mg Intravenous Q4HRS PRN Talia Pope M.D.           Fluids    Intake/Output Summary (Last 24 hours) at 10/11/2023 2123  Last data filed at 10/11/2023 1800  Gross per 24 hour   Intake 361.04 ml   Output 4870 ml   Net -4508.96 ml       Laboratory  Recent Labs     10/09/23  1048 10/09/23  1423 10/10/23  0328 10/10/23  0730   VLJTB32A 7.18*  --   --   --    [id number] 47.8*  --   --   --    [id number] 112.4*  --   --   --    YIGK2ZWN 97.3  --   --   --    ARTHCO3 17  --   --   --    [id number] 15L  15.0*  --   --   --    ARTBE -11*  --   --   --    ISTATAPH  --  7.349*  --   --    ISTATAPCO2  --  32.9  --   --    ISTATAPO2  --  230*  --   --    ISTATATCO2  --  19*  --   --    [id number]  --  100*  --   --    ISTATARTHCO3  --  18.1  --   --    ISTATARTBE  --  -7*  --   --    ISTATTEMP  --  97.5 F 98.8 F 97.7 F   ISTATFIO2  --  100 50 50   ISTATSPEC  --  Arterial Venous Venous   ISTATAPHTC  --  7.358*  --   --    [id number]  --  227*  --   --      Recent Labs     10/10/23  0425 10/11/23  0137 10/11/23  1510   SODIUM 138 139 141   POTASSIUM 3.2* 3.1* 3.0*   CHLORIDE 104 104 100   CO2 22 24 28   BUN 7* 6* 6*   CREATININE 0.64 0.56 0.57   MAGNESIUM 1.7 2.2  --    PHOSPHORUS 1.8* 3.3  --    CALCIUM 8.2* 7.7* 8.0*     Recent Labs     10/09/23  1509 10/10/23  0425 10/11/23  0137 10/11/23  1510   PREALBUMIN 6.7* 5.9* 4.9*  --    GLUCOSE  --  128* 116* 121*     Recent Labs     10/09/23  0031 10/10/23  0425 10/11/23  0137   WBC 4.7* 6.9 6.8   NEUTSPOLYS  --  69.00 61.20   LYMPHOCYTES  --  10.00* 12.90*   MONOCYTES  --  18.60* 22.10*   EOSINOPHILS  --  1.60 2.80   BASOPHILS  --  0.70 0.90     Recent Labs     10/09/23  0031 10/10/23  0425 10/11/23  0137   RBC 3.54* 3.59*  3.28*   HEMOGLOBIN 12.6* 12.9* 11.6*   HEMATOCRIT 36.3* 36.4* 33.8*   PLATELETCT 123* 164 171       Imaging  X-Ray:  I have personally reviewed the images and compared with prior images.  EKG:  I have personally reviewed the images and compared with prior images.  CT:    Reviewed  Echo:   Reviewed    ASSESSEMENT and PLAN:    * Respiratory failure requiring intubation (HCC)  Assessment & Plan  Secondary to aspiration pneumonitis, atelectasis. altered mental status, alcohol withdrawal with delirium, hypercarbic respiratory failure, benzodiazepine overdose  Intubated 10/9  Extubated 10/10    Telemetry monitoring   Continuous pulse oximetry   Supplemental O2 for Goal saturation >92%   Respiratory treatments: prn   Eventual encouragement of incentive spirometry/mobilization when appropriate  Pending SLP evaluation  Continue diuresis with lasix for potential HFpEF  Pulmonary hygiene, percussion therapy      Aspiration pneumonitis (HCC)  Assessment & Plan  Secondary to alcohol withdrawal syndrome  Blood cultures, sputum culture, procalcitonin  Hold off on antibiotics for now with low threshold to initiate should he develop fevers or worsening respiratory failure  Ongoing aspiration precautions, SLP evaluation    Alcohol withdrawal syndrome, with delirium (HCC)  Assessment & Plan  Worsening in last 24 hours, admit on 10/6, reported drinks 1-2 bottles of wine  Transferred to ICU for Precedex drip with scheduled and as needed Ativan monitoring RASS  Aspiration and seizure precautions  Continue vitamin supplementation  Eventual alcohol cessation education and resources to be provided when appropriate  Discontinue Ativan today  Delirium precautions      Urinary tract infection  Assessment & Plan  Febrile with flank pain  Urine Cx (10/8) - E. Faecalis, sensitive to ampicillin, nitrofurantoin, penicillin    Ampicillin (10/9-) for 5 day course  Consider changing copeland    Syncope and collapse- (present on admission)  Assessment &  Plan  Admitted for syncope 10/4, likely alcohol related  Echo (10/10/23): EF 55%, no valvular abnormalities, consider infiltrative cardiomyopathy    Continuous telemetry monitoring  Fall precautions  PT/OT    Primary hypertension- (present on admission)  Assessment & Plan  Continue doxazosin, losartan  Continue Lasix, considering down titrating Doxazosin 4 mg for orthostatic hypotension    Elevated troponin  Assessment & Plan  Secondary to demand ischemia    Macrocytic anemia  Assessment & Plan  Unchanged, no signs of active bleeding  Daily CBC with conservative transfusion strategy    Thrombocytopenia (HCC)  Assessment & Plan  Slowly improving, likely alcohol related  Monitor with daily CBC    Essential tremor  Assessment & Plan  Monitor    Hypokalemia  Assessment & Plan  Replete and monitor closely    Neuropathy- (present on admission)  Assessment & Plan  Continue Cymbalta    Hyperlipidemia- (present on admission)  Assessment & Plan  Continue rosuvastatin    Benign prostatic hyperplasia with urinary retention- (present on admission)  Assessment & Plan  Continue doxazosin        VTE:  Lovenox  Ulcer: H2 Antagonist  Lines: Maza Catheter  Ongoing indication addressed    I have performed a physical exam and reviewed and updated ROS and Plan today (10/11/2023). In review of yesterday's note (10/10/2023), there are no changes except as documented above.       Gagandeep Tinajero D.O.  PGY-3 Internal Medicine Resident

## 2023-10-11 NOTE — THERAPY
Speech Language Pathology   Clinical Swallow Evaluation     Patient Name: Balwinder Grimes  AGE:  72 y.o., SEX:  male  Medical Record #: [id number]  Date of Service: 10/11/2023      History of Present Illness  Pt is a 72 y.o. M w/ h/o HTN, BPH, HLD, ETOH abuse, essential tremor, admitted following syncopal episode with fall and head strike. CT negative. Pt dx with anemia, alcohol withdrawal.  Intubated 10/9/23 - 10/10/23.    No hx of ST in EMR.     CT HEAD 10/4/23:   1.  Cerebral atrophy.  2.  White matter lucencies most consistent with small vessel ischemic change versus demyelination or gliosis.  3.  Otherwise, Head CT without contrast with no acute findings. No evidence of acute cerebral infarction, hemorrhage or mass lesion.    CXR 10/9/23:   1.  Pulmonary edema. Multifocal pneumonia could have a similar appearance.  2.  Likely small/moderate bilateral pleural effusions.    CXR 10/10/23  RIGHT larger than LEFT pleural effusion with overlying atelectasis and/or airspace disease and suspected superimposed pulmonary edema, increased since prior          General Information:  Vitals  O2 (LPM): 5  O2 Delivery Device: Silicone Nasal Cannula  Level of Consciousness: Lethargic  Patient Behaviors: Lethargic, Uncooperative, Flat Affect  Orientation: Self, , Current year  Follows Directives: No      Prior Living Situation & Level of Function:  Prior Services: Home-Independent  Housing / Facility: [address] [facility]  Lives with - Patient's Self Care Capacity: Spouse  Swallowing: Unable To Determine At This Time       Oral Mechanism Evaluation:  Dentition: Fair   Facial Symmetry: Equal  Facial Sensation: Pt did not follow commands to assess     Labial Observations: Pt did not follow commands to assess   Lingual Observations: Midline, Xerostomia            Laryngeal Function:  Secretion Management: Expectoration of secretions  Voice Quality: Hoarse, Wet  Cough: Perceptually weak         Subjective  Patient cleared by RN for  evaluation. Pt received asleep, needing verbal/tactile cues to rouse and maintain wakefulness. Pt remained with eyes closed for majority of session, did not follow commands.      Assessment  Current Method of Nutrition: NPO until cleared by speech pathology  Positioning: Soto's (60-90 degrees)  Bolus Administration: SLP  O2 (LPM): 5 O2 Delivery Device: Silicone Nasal Cannula  Factor(s) Affecting Performance: Impaired endurance, Impaired mental status, Impaired command following  Tracheostomy : No             Swallowing Trials:  Swallowing Trials  Ice: Impaired  Thin Liquid (TN0): Impaired      Comments: Oral care provided prior to PO presentation. Pt needing 1:1 feeding A 2/2 AMS and generalized weakness. Wet/gurgly vocal quality and wet cough noted prior to PO trials, was exacerbated with PO intake. Adequate oral bolus acceptance with trials of ice chips and thin liquids. Anterior bolus loss with trials of thin liquids via tsp. Delayed, weak cough response across all trials, concerning for airway invasion.       Clinical Impressions  Patient presents with clinical indicators and is at high risk for oropharyngeal dysphagia given endotracheal intubation, prolonged NPO status and AMS. Given dysphonia, concerns for adequate vocal fold adduction; therefore poor airway protection. Pt would benefit from instrumental swallow study to objectively assess swallow function and informed POC; however, pt is not appropriate to participate on this date. Service will follow and re-evaluate readiness for oral diet vs diagnostic swallow study.        Recommendations  Diet Consistency: NPO, pending re-evaluation 10/12   -Clear for minimal ice chips/hour 1:1 w/RN, when alert, after oral care to reduce xerostomia and mitigate disuse atrophy of swallow musculature  Instrumentation: Instrumental swallow study pending clinical progress  Medication: Non Oral  Oral Care: Q4h         SLP Treatment Plan  Treatment Plan: Dysphagia Treatment,  Patient/Family/Caregiver Training  SLP Frequency: 3x Per Week  Estimated Duration: Until Therapy Goals Met      Anticipated Discharge Needs  Discharge Recommendations: Recommend post-acute placement for additional speech therapy services prior to discharge home   Therapy Recommendations Upon DC: Dysphagia Training, Patient / Family / Caregiver Education        Patient / Family Goals  Patient / Family Goal #1: none stated  Short Term Goals  Short Term Goal # 1: Patient will participate in pre-feeding trials with SLP only with no overt s/sx of aspiration      Maria Ines Chavez MS,CCC-SLP

## 2023-10-11 NOTE — CARE PLAN
The patient is Watcher - Medium risk of patient condition declining or worsening    Shift Goals  Clinical Goals: Improve mentation  Patient Goals: MAXX  Family Goals: MAXX    Progress made toward(s) clinical / shift goals:    Problem: Risk for Aspiration  Goal: Patient's risk for aspiration will be absent or decrease  Outcome: Progressing  Flowsheets  Taken 10/11/2023 0047  Aspiration Prevention:   Assessed for signs and symptoms of aspiration   Confirmed NPO order until medically cleared   Collaborated with SLP   Implemented aspiration precautions   Assessed breathsounds and vitals after oral intake  Head Of Bed: Greater than 30 degrees  Taken 10/10/2023 2000  Head of Bed Elevated: Greater or equal to 30 degrees  Note: HOB greater than 30 degrees. Frequent oral suctioning. Frequent encouragement to deep breathe and cough. Turning pt q2hr.     Problem: Pain - Standard  Goal: Alleviation of pain or a reduction in pain to the patient’s comfort goal  Outcome: Progressing  Flowsheets (Taken 10/11/2023 0000)  Critical-Care Pain Observation Score: 0  Note: Pt does not endorse pain.       Patient is not progressing towards the following goals:

## 2023-10-11 NOTE — CONSULTS
Hospital Medicine Consultation    Date of Service  10/11/2023    Referring Physician  Gustavo Ba DO  Consulting Physician  Edward Mendez M.D.    Reason for Consultation  Hospital medicine consultation requested patient admitted with reported fall, alcohol abuse    History of Presenting Illness  72 y.o. male who presented 10/4/2023 with a past med history significant for hypertension, dyslipidemia, alcohol abuse, admitted to the hospital in October for after syncopal event resulting in forehead trauma, the patient had complained of increasing dizziness for the last 2 weeks, had accidentally taken double dose of doxazosin reportedly, reported a recent discharge from alcohol rehab and told that he had been sober for 10 days, he had an elevated alcohol level on admission, he was admitted to the [facility] family medicine service for syncope work-up and alcohol withdrawal, reportedly the patient was aggressively treated for tremors and suppose it CIWA scores with elevation, received 20 mg of Ativan, subsequently found obtunded and hypoxic requiring escalation to the ICU level of care, had a large amount of oral secretions, suctioned, chest x-ray positive for bilateral lower lobe infiltrates, was given flumazenil with some level of improvement, moved to the ICU level of care for the patient was intubated, successfully extubated on 10/10/2023, the patient remains significantly confused, appears sedate, wet breath sounds, chest x-ray with right greater than left effusion, infiltrates, currently not a historian  Per the patient's sister the patient has been recently deteriorating in terms of his alcohol intake reportedly.    Review of Systems  Review of Systems   Unable to perform ROS: Mental acuity       Past Medical History   has a past medical history of High cholesterol, Hypertension, Numbness and tingling, and Pain (08/2018).  Alcohol abuse  Dermatitis    Surgical History   has a past surgical history that includes  other abdominal surgery (1983); other abdominal surgery (2005); dupuytren contracture release (Right, 2010); other orthopedic surgery (2010); and lumbar decompression (8/21/2018).    Family History  No contributory family history is reported    Social History   reports that he quit smoking about 15 years ago. His smoking use included cigarettes. He started smoking about 35 years ago. He has a 13.0 pack-year smoking history. He has never used smokeless tobacco. He reports that he does not currently use alcohol after a past usage of about 8.4 oz of alcohol per week. He reports that he does not use drugs.    Medications  Prior to Admission Medications   Prescriptions Last Dose Informant Patient Reported? Taking?   Ascorbic Acid (JAYLENE-C PO) ABOUT A WEEK AGO at AM Patient Yes Yes   Sig: Take 1 Tablet by mouth every day.   B Complex Vitamins (B COMPLEX 1 PO) ABOUT A WEEK AGO at AM Patient Yes Yes   Sig: Take 1 Tablet by mouth every day.   Cholecalciferol (VITAMIN D3 PO) ABOUT A WEEK AGO at AM Patient Yes Yes   Sig: Take 1 Tablet by mouth every day.   DULoxetine (CYMBALTA) 30 MG Cap DR Particles 10/4/2023 at 0700 Patient No No   Sig: Take 1 Capsule by mouth every day.   acetaminophen (TYLENOL) 500 MG Tab 10/2/2023 at UNK Patient Yes Yes   Sig: Take 1,000 mg by mouth 1 time a day as needed for Mild Pain.   doxazosin (CARDURA) 4 MG Tab 10/3/2023 at AM Patient No No   Sig: Take 1 Tablet by mouth every evening.   ibuprofen (MOTRIN) 200 MG Tab 10/3/2023 at 1900 Patient Yes Yes   Sig: Take 800 mg by mouth 1 time a day as needed for Mild Pain. 4 TABLETS=800 MG   losartan (COZAAR) 50 MG Tab 10/4/2023 at 0700 Patient No No   Sig: Take 1 Tablet by mouth every day.   rosuvastatin (CRESTOR) 20 MG Tab 10/3/2023 at 0630 Patient No No   Sig: Take 1 Tablet by mouth every evening.      Facility-Administered Medications: None       Allergies  No Known Allergies    Physical Exam  Temp:  [37.3 °C (99.2 °F)-37.5 °C (99.5 °F)] 37.3 °C (99.2  °F)  Pulse:  [] (P) 92  Resp:  [16-29] (P) 22  BP: ()/(52-88) (P) 151/84  SpO2:  [92 %-98 %] (P) 97 %    Physical Exam  Vitals and nursing note reviewed.   Constitutional:       Appearance: He is well-developed. He is ill-appearing and diaphoretic. He is not toxic-appearing.      Comments: Elderly male, obtunded   HENT:      Head: Normocephalic.      Nose: Congestion present.   Eyes:      Pupils: Pupils are equal, round, and reactive to light.   Cardiovascular:      Rate and Rhythm: Regular rhythm. Tachycardia present.      Heart sounds: Normal heart sounds.   Pulmonary:      Effort: Pulmonary effort is normal.      Breath sounds: Rhonchi and rales present.      Comments: Wet breath sounds  Abdominal:      General: Bowel sounds are normal.      Palpations: Abdomen is soft.   Genitourinary:     Penis: Normal.       Rectum: Normal.   Musculoskeletal:         General: Normal range of motion.      Cervical back: Normal range of motion.   Skin:     General: Skin is warm.      Findings: Erythema, lesion and rash present.   Neurological:      Mental Status: He is alert and oriented to person, place, and time.      Comments: Obtunded, mumbling   Psychiatric:      Comments: Unable to assess         Fluids      Laboratory  Recent Labs     10/09/23  0031 10/10/23  0425 10/11/23  0137   WBC 4.7* 6.9 6.8   RBC 3.54* 3.59* 3.28*   HEMOGLOBIN 12.6* 12.9* 11.6*   HEMATOCRIT 36.3* 36.4* 33.8*   .5* 101.4* 103.0*   MCH 35.6* 35.9* 35.4*   MCHC 34.7 35.4 34.3   RDW 43.0 42.5 43.8   PLATELETCT 123* 164 171   MPV 10.3 10.3 10.5     Recent Labs     10/09/23  1048 10/10/23  0425 10/11/23  0137   SODIUM 138 138 139   POTASSIUM 3.9 3.2* 3.1*   CHLORIDE 107 104 104   CO2 17* 22 24   GLUCOSE 113* 128* 116*   BUN 5* 7* 6*   CREATININE 0.50 0.64 0.56   CALCIUM 8.2* 8.2* 7.7*                     Imaging  DX-CHEST-PORTABLE (1 VIEW)   Final Result      RIGHT larger than LEFT pleural effusion with overlying atelectasis and/or  airspace disease and suspected superimposed pulmonary edema, increased since prior      EC-ECHOCARDIOGRAM COMPLETE W/O CONT   Final Result      DX-CHEST-PORTABLE (1 VIEW)   Final Result      RIGHT larger than LEFT pleural effusion with overlying atelectasis and/or airspace disease and suspected superimposed pulmonary edema, unchanged      DX-ABDOMEN FOR TUBE PLACEMENT   Final Result         Gastric drainage tube with tip projecting over the expected area of the stomach.      DX-ABDOMEN FOR TUBE PLACEMENT   Final Result         Gastric drainage tube coiled in the stomach with tip projecting over the expected area of the distal stomach.      DX-CHEST-PORTABLE (1 VIEW)   Final Result      1.  Likely layering bilateral pleural effusions.   2.  Interstitial infiltrates and/or edema.   3.  Support apparatus as above.      DX-CHEST-PORTABLE (1 VIEW)   Final Result      1.  Pulmonary edema. Multifocal pneumonia could have a similar appearance.   2.  Likely small/moderate bilateral pleural effusions.      DX-CHEST-PORTABLE (1 VIEW)   Final Result      No acute cardiopulmonary abnormality.      CT-CSPINE WITHOUT PLUS RECONS   Final Result      No acute fracture or dislocation of the cervical spine.      CT-HEAD W/O   Final Result      1.  Cerebral atrophy.      2.  White matter lucencies most consistent with small vessel ischemic change versus demyelination or gliosis.      3.  Otherwise, Head CT without contrast with no acute findings. No evidence of acute cerebral infarction, hemorrhage or mass lesion.             Assessment/Plan  * Respiratory failure requiring intubation (HCC)  Assessment & Plan  Secondary to aspiration pneumonitis, atelectasis. altered mental status, alcohol withdrawal with delirium, hypercarbic respiratory failure, benzodiazepine overdose  Telemetry monitoring   Continuous pulse oximetry   Goal saturation >92%   Head of bed > 30 degree   GI prophylaxis   Respiratory treatments: prn       Urinary tract  infection  Assessment & Plan  Febrile with flank pain  Urine Cx (10/8) - E. Faecalis, sensitive to ampicillin, nitrofurantoin, penicillin  Antibiotics for 5-day course    Aspiration pneumonitis (HCC)  Assessment & Plan  Secondary to alcohol withdrawal syndrome  Blood cultures, sputum culture, procalcitonin  Antibiotics, secretion management  Ongoing aspiration precautions, SLP evaluation    Alcohol withdrawal syndrome, with delirium (HCC)  Assessment & Plan  Admitted on 10/6, reported drinks 1-2 bottles of wine  Transferred to ICU for Precedex drip with scheduled and as needed Ativan monitoring RASS  Aspiration and seizure precautions  Continue vitamin supplementation  Eventual alcohol cessation education and resources to be provided when appropriate    Fever  Assessment & Plan  Respiratory panel negative, although patient at risk of respiratory infection, aspiration  -UA and urine culture obtained with copeland placement on 10/8/23 due to flank pain   Positive culture, recheck blood cultures, ongoing antibiotic treatment    Conjunctivitis of both eyes  Assessment & Plan  Bilateral conjunctivitis of the eye, with dried discharge on the eyelids.   Trimethoprim-polymyxin four times daily for 7 days    Elevated troponin  Assessment & Plan  Secondary to demand ischemia    Macrocytic anemia  Assessment & Plan  Pt with hemoglobin of 12.7 and MCV at 104.5 on admission; Likely due to heavy alcohol use.   - Continue to monitor CBC    - Continue multivitamin and thiamine supplement     Thrombocytopenia (HCC)  Assessment & Plan  Slowly improving, likely alcohol related  Monitor with daily CBC    Essential tremor  Assessment & Plan  Monitor    Hypokalemia  Assessment & Plan  Replete and monitor closely    Syncope and collapse- (present on admission)  Assessment & Plan  Admitted for syncope 10/4, likely alcohol related  Echo (10/10/23): EF 55%, no valvular abnormalities  Continuous telemetry monitoring  Fall  precautions  PT/OT    Neuropathy- (present on admission)  Assessment & Plan  Continue Cymbalta    Hyperlipidemia- (present on admission)  Assessment & Plan  Continue rosuvastatin    Benign prostatic hyperplasia with urinary retention- (present on admission)  Assessment & Plan  Continue doxazosin    Primary hypertension- (present on admission)  Assessment & Plan  Continue doxazosin, losartan as tolerated  Diuresis monitor        Plan  Broaden antibiotic coverage to cover for aspiration pneumonia  Ongoing Lasix, supportive care, potassium replacement, electrolyte replacement with monitoring  SLP evaluation, the patient failed today,  Hold off from feeding tube for the time being until the patient is hopefully further improved tomorrow  Close laboratory follow-up  Hemodynamic monitoring  See orders  Patient is has a high medical complexity, complex decision making and is at high risk for complication, morbidity, and mortality.  My total time spent caring for the patient on the day of the encounter was 63 minutes.   This does not include time spent on separately billable procedures/tests.  Thank you for consulting with us, we will follow along closely while the patient is hospitalized      Please note that this dictation was created using voice recognition software. I have made every reasonable attempt to correct obvious errors, but I expect that there are errors of grammar and possibly context that I did not discover before finalizing the note.

## 2023-10-11 NOTE — ASSESSMENT & PLAN NOTE
Febrile with flank pain  Urine Cx (10/8) - E. Faecalis, sensitive to ampicillin, nitrofurantoin, penicillin  Completed antibiotics

## 2023-10-11 NOTE — ASSESSMENT & PLAN NOTE
Secondary to alcohol withdrawal syndrome  Blood cultures have been negative as has the BAL  S/p completed course of antibiotics  Speech therapy has evaluated and he is on modified diet

## 2023-10-12 LAB
ALBUMIN SERPL BCP-MCNC: 2.8 G/DL (ref 3.2–4.9)
ALBUMIN/GLOB SERPL: 0.9 G/DL
ALP SERPL-CCNC: 103 U/L (ref 30–99)
ALT SERPL-CCNC: 13 U/L (ref 2–50)
ANION GAP SERPL CALC-SCNC: 12 MMOL/L (ref 7–16)
AST SERPL-CCNC: 19 U/L (ref 12–45)
BILIRUB SERPL-MCNC: 0.9 MG/DL (ref 0.1–1.5)
BUN SERPL-MCNC: 8 MG/DL (ref 8–22)
CALCIUM ALBUM COR SERPL-MCNC: 9.3 MG/DL (ref 8.5–10.5)
CALCIUM SERPL-MCNC: 8.3 MG/DL (ref 8.5–10.5)
CHLORIDE SERPL-SCNC: 99 MMOL/L (ref 96–112)
CO2 SERPL-SCNC: 30 MMOL/L (ref 20–33)
CREAT SERPL-MCNC: 0.68 MG/DL (ref 0.5–1.4)
ERYTHROCYTE [DISTWIDTH] IN BLOOD BY AUTOMATED COUNT: 41.5 FL (ref 35.9–50)
GFR SERPLBLD CREATININE-BSD FMLA CKD-EPI: 98 ML/MIN/1.73 M 2
GLOBULIN SER CALC-MCNC: 3.1 G/DL (ref 1.9–3.5)
GLUCOSE SERPL-MCNC: 118 MG/DL (ref 65–99)
HCT VFR BLD AUTO: 38 % (ref 42–52)
HGB BLD-MCNC: 13.4 G/DL (ref 14–18)
MAGNESIUM SERPL-MCNC: 2 MG/DL (ref 1.5–2.5)
MCH RBC QN AUTO: 35.4 PG (ref 27–33)
MCHC RBC AUTO-ENTMCNC: 35.3 G/DL (ref 32.3–36.5)
MCV RBC AUTO: 100.5 FL (ref 81.4–97.8)
NT-PROBNP SERPL IA-MCNC: 2401 PG/ML (ref 0–125)
PHOSPHATE SERPL-MCNC: 3 MG/DL (ref 2.5–4.5)
PLATELET # BLD AUTO: 225 K/UL (ref 164–446)
PMV BLD AUTO: 10.1 FL (ref 9–12.9)
POTASSIUM SERPL-SCNC: 2.9 MMOL/L (ref 3.6–5.5)
PROT SERPL-MCNC: 5.9 G/DL (ref 6–8.2)
RBC # BLD AUTO: 3.78 M/UL (ref 4.7–6.1)
SODIUM SERPL-SCNC: 141 MMOL/L (ref 135–145)
WBC # BLD AUTO: 6.8 K/UL (ref 4.8–10.8)

## 2023-10-12 PROCEDURE — 700102 HCHG RX REV CODE 250 W/ 637 OVERRIDE(OP): Performed by: INTERNAL MEDICINE

## 2023-10-12 PROCEDURE — 700111 HCHG RX REV CODE 636 W/ 250 OVERRIDE (IP): Mod: JZ | Performed by: INTERNAL MEDICINE

## 2023-10-12 PROCEDURE — 92526 ORAL FUNCTION THERAPY: CPT

## 2023-10-12 PROCEDURE — A9270 NON-COVERED ITEM OR SERVICE: HCPCS | Performed by: HOSPITALIST

## 2023-10-12 PROCEDURE — 700111 HCHG RX REV CODE 636 W/ 250 OVERRIDE (IP): Mod: JZ | Performed by: STUDENT IN AN ORGANIZED HEALTH CARE EDUCATION/TRAINING PROGRAM

## 2023-10-12 PROCEDURE — 84132 ASSAY OF SERUM POTASSIUM: CPT

## 2023-10-12 PROCEDURE — 84100 ASSAY OF PHOSPHORUS: CPT

## 2023-10-12 PROCEDURE — 700102 HCHG RX REV CODE 250 W/ 637 OVERRIDE(OP): Performed by: HOSPITALIST

## 2023-10-12 PROCEDURE — 36415 COLL VENOUS BLD VENIPUNCTURE: CPT

## 2023-10-12 PROCEDURE — 83880 ASSAY OF NATRIURETIC PEPTIDE: CPT

## 2023-10-12 PROCEDURE — 700105 HCHG RX REV CODE 258: Performed by: HOSPITALIST

## 2023-10-12 PROCEDURE — 99233 SBSQ HOSP IP/OBS HIGH 50: CPT | Performed by: HOSPITALIST

## 2023-10-12 PROCEDURE — 700111 HCHG RX REV CODE 636 W/ 250 OVERRIDE (IP): Performed by: HOSPITALIST

## 2023-10-12 PROCEDURE — A9270 NON-COVERED ITEM OR SERVICE: HCPCS | Performed by: INTERNAL MEDICINE

## 2023-10-12 PROCEDURE — 85027 COMPLETE CBC AUTOMATED: CPT

## 2023-10-12 PROCEDURE — 80053 COMPREHEN METABOLIC PANEL: CPT

## 2023-10-12 PROCEDURE — 770000 HCHG ROOM/CARE - INTERMEDIATE ICU *

## 2023-10-12 PROCEDURE — 83735 ASSAY OF MAGNESIUM: CPT

## 2023-10-12 RX ORDER — VALPROIC ACID 250 MG/5ML
250 SOLUTION ORAL EVERY 8 HOURS
Status: DISCONTINUED | OUTPATIENT
Start: 2023-10-12 | End: 2023-10-13

## 2023-10-12 RX ORDER — CLOBETASOL PROPIONATE 0.5 MG/G
CREAM TOPICAL 2 TIMES DAILY
Status: DISCONTINUED | OUTPATIENT
Start: 2023-10-12 | End: 2023-10-20 | Stop reason: HOSPADM

## 2023-10-12 RX ORDER — LORAZEPAM 2 MG/ML
3 INJECTION INTRAMUSCULAR
Status: DISCONTINUED | OUTPATIENT
Start: 2023-10-12 | End: 2023-10-13

## 2023-10-12 RX ORDER — LORAZEPAM 2 MG/ML
1 INJECTION INTRAMUSCULAR
Status: DISCONTINUED | OUTPATIENT
Start: 2023-10-12 | End: 2023-10-13

## 2023-10-12 RX ORDER — HALOPERIDOL 5 MG/ML
5 INJECTION INTRAMUSCULAR EVERY 6 HOURS PRN
Status: DISPENSED | OUTPATIENT
Start: 2023-10-12 | End: 2023-10-12

## 2023-10-12 RX ORDER — LORAZEPAM 2 MG/ML
4 INJECTION INTRAMUSCULAR
Status: DISCONTINUED | OUTPATIENT
Start: 2023-10-12 | End: 2023-10-13

## 2023-10-12 RX ORDER — DIVALPROEX SODIUM 250 MG/1
250 TABLET, DELAYED RELEASE ORAL EVERY 8 HOURS
Status: DISCONTINUED | OUTPATIENT
Start: 2023-10-12 | End: 2023-10-12

## 2023-10-12 RX ORDER — HALOPERIDOL 5 MG/ML
5 INJECTION INTRAMUSCULAR ONCE
Status: COMPLETED | OUTPATIENT
Start: 2023-10-12 | End: 2023-10-12

## 2023-10-12 RX ORDER — POTASSIUM CHLORIDE 7.45 MG/ML
10 INJECTION INTRAVENOUS
Status: COMPLETED | OUTPATIENT
Start: 2023-10-12 | End: 2023-10-12

## 2023-10-12 RX ORDER — LORAZEPAM 2 MG/ML
2 INJECTION INTRAMUSCULAR
Status: DISCONTINUED | OUTPATIENT
Start: 2023-10-12 | End: 2023-10-13

## 2023-10-12 RX ADMIN — AMPICILLIN AND SULBACTAM 3 G: 1; 2 INJECTION, POWDER, FOR SOLUTION INTRAMUSCULAR; INTRAVENOUS at 17:33

## 2023-10-12 RX ADMIN — AMPICILLIN AND SULBACTAM 3 G: 1; 2 INJECTION, POWDER, FOR SOLUTION INTRAMUSCULAR; INTRAVENOUS at 05:54

## 2023-10-12 RX ADMIN — POLYMYXIN B SULFATE AND TRIMETHOPRIM 1 DROP: 10000; 1 SOLUTION OPHTHALMIC at 07:58

## 2023-10-12 RX ADMIN — POTASSIUM CHLORIDE 10 MEQ: 7.46 INJECTION, SOLUTION INTRAVENOUS at 16:53

## 2023-10-12 RX ADMIN — LORAZEPAM 1 MG: 2 INJECTION INTRAMUSCULAR; INTRAVENOUS at 18:59

## 2023-10-12 RX ADMIN — HALOPERIDOL LACTATE 5 MG: 5 INJECTION, SOLUTION INTRAMUSCULAR at 07:52

## 2023-10-12 RX ADMIN — POLYMYXIN B SULFATE AND TRIMETHOPRIM 1 DROP: 10000; 1 SOLUTION OPHTHALMIC at 21:45

## 2023-10-12 RX ADMIN — AMPICILLIN AND SULBACTAM 3 G: 1; 2 INJECTION, POWDER, FOR SOLUTION INTRAMUSCULAR; INTRAVENOUS at 12:24

## 2023-10-12 RX ADMIN — POTASSIUM CHLORIDE 10 MEQ: 7.46 INJECTION, SOLUTION INTRAVENOUS at 17:57

## 2023-10-12 RX ADMIN — POTASSIUM CHLORIDE 10 MEQ: 7.46 INJECTION, SOLUTION INTRAVENOUS at 20:12

## 2023-10-12 RX ADMIN — POTASSIUM CHLORIDE 10 MEQ: 7.46 INJECTION, SOLUTION INTRAVENOUS at 19:00

## 2023-10-12 RX ADMIN — HYDRALAZINE HYDROCHLORIDE 20 MG: 20 INJECTION, SOLUTION INTRAMUSCULAR; INTRAVENOUS at 05:50

## 2023-10-12 RX ADMIN — AMPICILLIN AND SULBACTAM 3 G: 1; 2 INJECTION, POWDER, FOR SOLUTION INTRAMUSCULAR; INTRAVENOUS at 00:34

## 2023-10-12 RX ADMIN — POLYMYXIN B SULFATE AND TRIMETHOPRIM 1 DROP: 10000; 1 SOLUTION OPHTHALMIC at 17:33

## 2023-10-12 RX ADMIN — POTASSIUM CHLORIDE 10 MEQ: 7.46 INJECTION, SOLUTION INTRAVENOUS at 14:39

## 2023-10-12 RX ADMIN — POTASSIUM CHLORIDE 10 MEQ: 7.46 INJECTION, SOLUTION INTRAVENOUS at 15:37

## 2023-10-12 RX ADMIN — FUROSEMIDE 20 MG: 10 INJECTION, SOLUTION INTRAVENOUS at 15:35

## 2023-10-12 RX ADMIN — CLOBETASOL PROPIONATE CREAM USP, 0.05%: 0.5 CREAM TOPICAL at 17:34

## 2023-10-12 RX ADMIN — ENOXAPARIN SODIUM 40 MG: 100 INJECTION SUBCUTANEOUS at 17:33

## 2023-10-12 RX ADMIN — FUROSEMIDE 20 MG: 10 INJECTION, SOLUTION INTRAVENOUS at 05:50

## 2023-10-12 RX ADMIN — HALOPERIDOL LACTATE 5 MG: 5 INJECTION, SOLUTION INTRAMUSCULAR at 02:12

## 2023-10-12 RX ADMIN — AMPICILLIN AND SULBACTAM 3 G: 1; 2 INJECTION, POWDER, FOR SOLUTION INTRAMUSCULAR; INTRAVENOUS at 23:37

## 2023-10-12 RX ADMIN — LORAZEPAM 1 MG: 2 INJECTION INTRAMUSCULAR; INTRAVENOUS at 16:06

## 2023-10-12 ASSESSMENT — PAIN DESCRIPTION - PAIN TYPE
TYPE: ACUTE PAIN

## 2023-10-12 NOTE — DIETARY
Nutrition Services: Update   Day 8 of admit.  Balwinder Grimes is a 72 y.o. male with admitting DX of Syncope and collapse [R55].    Pt is currently NPO since extubation with feeding tube removal on 10/10.  Pt previously on a Regular diet 10/4-10/9, then received TF @ 25 ml/hr for <24 hours during intubation period.  SLP attempted a swallow evaluation yesterday (10/11), but pt was not alert enough to fully complete.   Of note, pt's PO intake was variable/inadequate when on a diet previously.    RD following for nutritional POC. Of note, prior TF orders are still active.

## 2023-10-12 NOTE — CARE PLAN
The patient is Watcher - Medium risk of patient condition declining or worsening    Shift Goals  Clinical Goals: Improve respiratory function  Patient Goals: MAXX  Family Goals: MAXX (no family present)    Progress made toward(s) clinical / shift goals:    Problem: Knowledge Deficit - Standard  Goal: Patient and family/care givers will demonstrate understanding of plan of care, disease process/condition, diagnostic tests and medications  Outcome: Progressing     Problem: Fall Risk  Goal: Patient will remain free from falls  Outcome: Progressing     Problem: Optimal Care for Alcohol Withdrawal  Goal: Optimal Care for the alcohol withdrawal patient  Outcome: Progressing       Patient is more oriented than before able to verbalize understanding of situation.

## 2023-10-12 NOTE — CARE PLAN
The patient is Watcher - Medium risk of patient condition declining or worsening    Shift Goals  Clinical Goals: fall precautions, monitor agitation, improve resp  Patient Goals: MAXX  Family Goals: MAXX    Progress made toward(s) clinical / shift goals:     Problem: Optimal Care for Alcohol Withdrawal  Goal: Optimal Care for the alcohol withdrawal patient  Outcome: Progressing     Problem: Seizure Precautions  Goal: Implementation of seizure precautions  Outcome: Progressing     Problem: Skin Integrity  Goal: Skin integrity is maintained or improved  Outcome: Progressing     Problem: Safety - Medical Restraint  Goal: Free from restraint(s) (Restraint for Interference with Medical Device)  Outcome: Progressing     Problem: Pain - Standard  Goal: Alleviation of pain or a reduction in pain to the patient’s comfort goal  Outcome: Progressing       Patient is not progressing towards the following goals: patient does not follow commands when he is agitated. Patient continues to pull on his leads and his copeland         Problem: Lifestyle Changes  Goal: Patient's ability to identify lifestyle changes and available resources to help reduce recurrence of condition will improve  Outcome: Not Progressing     Problem: Psychosocial  Goal: Patient's level of anxiety will decrease  Outcome: Not Progressing     Problem: Risk for Aspiration  Goal: Patient's risk for aspiration will be absent or decrease  Outcome: Not Progressing     Problem: Fall Risk  Goal: Patient will remain free from falls  Outcome: Not Progressing     Problem: Knowledge Deficit - Standard  Goal: Patient and family/care givers will demonstrate understanding of plan of care, disease process/condition, diagnostic tests and medications  Outcome: Not Progressing

## 2023-10-12 NOTE — THERAPY
Speech Language Pathology   Daily Treatment     Patient Name: Balwinder Grimes  AGE:  72 y.o., SEX:  male  Medical Record #: [id number]  Date of Service: 10/12/2023      Precautions:  Precautions: Fall Risk, Swallow Precautions     Pt is a 72 y.o. M w/ h/o HTN, BPH, HLD, ETOH abuse, essential tremor, admitted following syncopal episode with fall and head strike. CT negative. Pt dx with anemia, alcohol withdrawal. Intubated 10/9/23 - 10/10/23.       Subjective  Patient cleared by RN for session. Pt received asleep, needing verbal/tactile cues to rouse and remain wakefulness. Pt w/ eyes closed for majority of session.       Assessment  Patient seen on this date for dysphagia management with focus on re-evaluating readiness for oral diet vs diagnostic swallow study. Oral care provided prior to PO presentations. PO trials of ice chips, thin liquids and liquidized assessed. Adequate oral acceptance, mild containment w/ anterior bolus loss with trials of liquids via tsp/cup. Pt needing max encouragement to accept PO of liquidized. Pt declined further PO trials. Baseline wet/gurlgy vocal quality exacerbated by PO trials, concerning for airway invasion.      Clinical Impressions  Patient presents with clinical indicators and is at high risk for oropharyngeal dysphagia given endotracheal intubation, prolonged NPO status and AMS. Pt would benefit from instrumental swallow study to objectively assess swallow function and informed POC; however, pt is not appropriate to participate on this date. Service following.      Recommendations  Treatment Completed: Dysphagia Treatment       Dysphagia Treatment  Diet Consistency: NPO, pre-feeding trials with SLP only   -Clear for minimal ice chips/hour and sips of water, 1:1 w/RN, when alert, after oral care to reduce xerostomia and mitigate disuse atrophy of swallow musculature  Instrumentation: Instrumental swallow study pending clinical progress  Medication: Crush with pudding/puree,  "as appropriate (sips with water)  Oral Care: Q4h                     SLP Treatment Plan  Treatment Plan: Dysphagia Treatment, Patient/Family/Caregiver Training  SLP Frequency: 3x Per Week  Estimated Duration: Until Therapy Goals Met      Anticipated Discharge Needs  Discharge Recommendations: Recommend post-acute placement for additional speech therapy services prior to discharge home  Therapy Recommendations Upon DC: Dysphagia Training, Patient / Family / Caregiver Education      Patient / Family Goals  Patient / Family Goal #1: \"water\"  Short Term Goals  Short Term Goal # 1: Patient will participate in pre-feeding trials with SLP only with no overt s/sx of aspiration  Goal Outcome # 1: Progressing slower than expected      Maria Ines Chavez MS,CCC-SLP    "

## 2023-10-12 NOTE — PROGRESS NOTES
4 Eyes Skin Assessment Completed by Iza Enriquez, RN and MILTON Padron.    Head Scab and Redness  Ears WDL  Nose Redness  Mouth WDL  Neck WDL  Breast/Chest WDL  Shoulder Blades WDL  Spine Redness rash  (R) Arm/Elbow/Hand Swelling and Edema +2  (L) Arm/Elbow/Hand Abrasion and Scab old skin tear  Abdomen WDL  Groin WDL  Scrotum/Coccyx/Buttocks Redness on scrotum  (R) Leg WDL  (L) Leg Scab  (R) Heel/Foot/Toe Redness and Discoloration 2nd toe wound  (L) Heel/Foot/Toe WDL          Devices In Places Tele Box, Blood Pressure Cuff, Pulse Ox, and Nasal Cannula      Interventions In Place Gray Ear Foams and Pillows    Possible Skin Injury No    Pictures Uploaded Into Epic N/A  Wound Consult Placed N/A  RN Wound Prevention Protocol Ordered No

## 2023-10-12 NOTE — PROGRESS NOTES
Bedside report received from previous RN, pt care assumed, assessment completed. Pt is A&O 3 disoriented to date, pt does not appear to be in pain. Updated on POC, questions answered. Bed in lowest, locked position, treaded socks on, call light and belongings within reach.

## 2023-10-12 NOTE — PROGRESS NOTES
Hospital Medicine Daily Progress Note    Date of Service  10/12/2023    Chief Complaint  Balwinder Grimes is a 72 y.o. male admitted 10/4/2023 with fall, alcohol abuse    Hospital Course  72 y.o. male who presented 10/4/2023 with a past med history significant for hypertension, dyslipidemia, alcohol abuse, admitted to the hospital in October for after syncopal event resulting in forehead trauma, the patient had complained of increasing dizziness for the last 2 weeks, had accidentally taken double dose of doxazosin reportedly, reported a recent discharge from alcohol rehab and told that he had been sober for 10 days, he had an elevated alcohol level on admission, he was admitted to the [facility] [place] family medicine service for syncope work-up and alcohol withdrawal, reportedly the patient was aggressively treated for tremors and suppose it CIWA scores with elevation, received 20 mg of Ativan, subsequently found obtunded and hypoxic requiring escalation to the ICU level of care, had a large amount of oral secretions, suctioned, chest x-ray positive for bilateral lower lobe infiltrates, was given flumazenil with some level of improvement, moved to the ICU level of care for the patient was intubated, successfully extubated on 10/10/2023, the patient remains significantly confused, appears sedate, wet breath sounds, chest x-ray with right greater than left effusion, infiltrates, currently not a historian  Per the patient's sister the patient has been recently deteriorating in terms of his alcohol intake reportedly.    Interval Problem Update  Patient seen and examined today.  Data, Medication data reviewed.  Case discussed with nursing as available.  Plan of Care reviewed with patient and notified of changes.   10/12 patient reportedly awake all night, constantly attempting to remove catheters, lines, agitated, medicated with Haldol, tachycardic during his agitation episodes, on my examination the patient finally fell  asleep,  Currently afebrile, heart rate in the 90s, respiration unlabored, rhonchorous, saturating 92% on 2 L nasal cannula oxygen,  With a count of 6.8, hemoglobin 13.4, hematocrit 38, platelet count 225,  Sodium 141, potassium 2.9, chloride 99, bicarb 30, glucose 118, BUN is 8, creatinine 0.68,  BNP is 2401    I have discussed this patient's plan of care and discharge plan at IDT rounds today with Case Management, Nursing, Nursing leadership, and other members of the IDT team.    Consultants/Specialty  critical care    Code Status  Full Code    Disposition  The patient is not medically cleared for discharge to home or a post-acute facility.  Anticipate discharge to: skilled nursing facility    I have placed the appropriate orders for post-discharge needs.    Review of Systems  Review of Systems   Unable to perform ROS: Mental acuity        Physical Exam  Temp:  [36.3 °C (97.4 °F)-36.8 °C (98.3 °F)] 36.8 °C (98.3 °F)  Pulse:  [] 118  Resp:  [17-56] 25  BP: (117-176)/(59-98) 140/83  SpO2:  [86 %-97 %] 93 %    Physical Exam  Vitals and nursing note reviewed.   Constitutional:       Appearance: He is well-developed.      Comments: Pt seen and examined.  Intermittently agitated, obtunded   HENT:      Head: Normocephalic and atraumatic.      Comments: Facial rash  Eyes:      Pupils: Pupils are equal, round, and reactive to light.   Cardiovascular:      Rate and Rhythm: Normal rate and regular rhythm.      Heart sounds: Normal heart sounds.   Pulmonary:      Effort: Pulmonary effort is normal.      Breath sounds: Rhonchi and rales present.   Abdominal:      General: Bowel sounds are normal.      Palpations: Abdomen is soft.   Musculoskeletal:         General: Normal range of motion.      Cervical back: Normal range of motion and neck supple.   Skin:     General: Skin is warm and dry.      Findings: Bruising, erythema and rash present.   Neurological:      General: No focal deficit present.      Mental Status: He is  disoriented.   Psychiatric:      Comments: Unable to assess         Fluids    Intake/Output Summary (Last 24 hours) at 10/12/2023 0902  Last data filed at 10/12/2023 0801  Gross per 24 hour   Intake 696.31 ml   Output 3170 ml   Net -2473.69 ml       Laboratory  Recent Labs     10/10/23  0425 10/11/23  0137 10/12/23  0354   WBC 6.9 6.8 6.8   RBC 3.59* 3.28* 3.78*   HEMOGLOBIN 12.9* 11.6* 13.4*   HEMATOCRIT 36.4* 33.8* 38.0*   .4* 103.0* 100.5*   MCH 35.9* 35.4* 35.4*   MCHC 35.4 34.3 35.3   RDW 42.5 43.8 41.5   PLATELETCT 164 171 225   MPV 10.3 10.5 10.1     Recent Labs     10/11/23  0137 10/11/23  1510 10/12/23  0354   SODIUM 139 141 141   POTASSIUM 3.1* 3.0* 2.9*   CHLORIDE 104 100 99   CO2 24 28 30   GLUCOSE 116* 121* 118*   BUN 6* 6* 8   CREATININE 0.56 0.57 0.68   CALCIUM 7.7* 8.0* 8.3*                   Imaging  DX-CHEST-PORTABLE (1 VIEW)   Final Result      RIGHT larger than LEFT pleural effusion with overlying atelectasis and/or airspace disease and suspected superimposed pulmonary edema, increased since prior      EC-ECHOCARDIOGRAM COMPLETE W/O CONT   Final Result      DX-CHEST-PORTABLE (1 VIEW)   Final Result      RIGHT larger than LEFT pleural effusion with overlying atelectasis and/or airspace disease and suspected superimposed pulmonary edema, unchanged      DX-ABDOMEN FOR TUBE PLACEMENT   Final Result         Gastric drainage tube with tip projecting over the expected area of the stomach.      DX-ABDOMEN FOR TUBE PLACEMENT   Final Result         Gastric drainage tube coiled in the stomach with tip projecting over the expected area of the distal stomach.      DX-CHEST-PORTABLE (1 VIEW)   Final Result      1.  Likely layering bilateral pleural effusions.   2.  Interstitial infiltrates and/or edema.   3.  Support apparatus as above.      DX-CHEST-PORTABLE (1 VIEW)   Final Result      1.  Pulmonary edema. Multifocal pneumonia could have a similar appearance.   2.  Likely small/moderate bilateral  pleural effusions.      DX-CHEST-PORTABLE (1 VIEW)   Final Result      No acute cardiopulmonary abnormality.      CT-CSPINE WITHOUT PLUS RECONS   Final Result      No acute fracture or dislocation of the cervical spine.      CT-HEAD W/O   Final Result      1.  Cerebral atrophy.      2.  White matter lucencies most consistent with small vessel ischemic change versus demyelination or gliosis.      3.  Otherwise, Head CT without contrast with no acute findings. No evidence of acute cerebral infarction, hemorrhage or mass lesion.              Assessment/Plan  * Respiratory failure requiring intubation (HCC)  Assessment & Plan  Secondary to aspiration pneumonitis, atelectasis. altered mental status, alcohol withdrawal with delirium, hypercarbic respiratory failure, benzodiazepine overdose  Telemetry monitoring   Continuous pulse oximetry   Goal saturation >92%   Head of bed > 30 degree   GI prophylaxis   Respiratory treatments: prn       Urinary tract infection  Assessment & Plan  Febrile with flank pain  Urine Cx (10/8) - E. Faecalis, sensitive to ampicillin, nitrofurantoin, penicillin  Antibiotics for 5-day course    Aspiration pneumonitis (HCC)  Assessment & Plan  Secondary to alcohol withdrawal syndrome  Blood cultures, sputum culture, procalcitonin  Antibiotics, secretion management  Ongoing aspiration precautions, SLP evaluation    Alcohol withdrawal syndrome, with delirium (HCC)  Assessment & Plan  Admitted on 10/6, reported drinks 1-2 bottles of wine  Transferred to ICU for Precedex drip with scheduled and as needed Ativan monitoring RASS  Aspiration and seizure precautions  Continue vitamin supplementation  Eventual alcohol cessation education and resources to be provided when appropriate    Fever  Assessment & Plan  Respiratory panel negative, although patient at risk of respiratory infection, aspiration  -UA and urine culture obtained with [place] placement on 10/8/23 due to flank pain   Positive culture,  recheck blood cultures, ongoing antibiotic treatment    Conjunctivitis of both eyes  Assessment & Plan  Bilateral conjunctivitis of the eye, with dried discharge on the eyelids.   Trimethoprim-polymyxin four times daily for 7 days    Elevated troponin  Assessment & Plan  Secondary to demand ischemia    Macrocytic anemia  Assessment & Plan  Pt with hemoglobin of 12.7 and MCV at 104.5 on admission; Likely due to heavy alcohol use.   - Continue to monitor CBC    - Continue multivitamin and thiamine supplement     Thrombocytopenia (HCC)  Assessment & Plan  Slowly improving, likely alcohol related  Monitor with daily CBC    Essential tremor  Assessment & Plan  Monitor    Hypokalemia  Assessment & Plan  Replete and monitor closely    Syncope and collapse- (present on admission)  Assessment & Plan  Admitted for syncope 10/4, likely alcohol related  Echo (10/10/23): EF 55%, no valvular abnormalities  Continuous telemetry monitoring  Fall precautions  PT/OT    Neuropathy- (present on admission)  Assessment & Plan  Continue Cymbalta    Hyperlipidemia- (present on admission)  Assessment & Plan  Continue rosuvastatin    Benign prostatic hyperplasia with urinary retention- (present on admission)  Assessment & Plan  Continue doxazosin    Primary hypertension- (present on admission)  Assessment & Plan  Continue doxazosin, losartan as tolerated  Diuresis monitor     Plan  Broaden antibiotic coverage to cover for aspiration pneumonia  Ongoing Lasix, supportive care, potassium replacement, electrolyte replacement with monitoring  SLP evaluation, the patient failed today,  Hold off from feeding tube for the time being until the patient is hopefully further improved   Mild sedatives  Close laboratory follow-up  Hemodynamic monitoring  See orders  Patient is has a high medical complexity, complex decision making and is at high risk for complication, morbidity, and mortality.  My total time spent caring for the patient on the day of the  encounter was 53 minutes.   This does not include time spent on separately billable procedures/tests.    VTE prophylaxis:    enoxaparin ppx      I have performed a physical exam and reviewed and updated ROS and Plan today (10/12/2023). In review of yesterday's note (10/11/2023), there are no changes except as documented above.      Please note that this dictation was created using voice recognition software. I have made every reasonable attempt to correct obvious errors, but I expect that there are errors of grammar and possibly context that I did not discover before finalizing the note.

## 2023-10-13 ENCOUNTER — APPOINTMENT (OUTPATIENT)
Dept: RADIOLOGY | Facility: MEDICAL CENTER | Age: 73
DRG: 896 | End: 2023-10-13
Attending: HOSPITALIST
Payer: MEDICARE

## 2023-10-13 PROBLEM — R13.12 OROPHARYNGEAL DYSPHAGIA: Status: ACTIVE | Noted: 2023-10-13

## 2023-10-13 LAB
ALBUMIN SERPL BCP-MCNC: 3 G/DL (ref 3.2–4.9)
ALBUMIN/GLOB SERPL: 1 G/DL
ALP SERPL-CCNC: 102 U/L (ref 30–99)
ALT SERPL-CCNC: 17 U/L (ref 2–50)
AMMONIA PLAS-SCNC: 21 UMOL/L (ref 11–45)
ANION GAP SERPL CALC-SCNC: 13 MMOL/L (ref 7–16)
AST SERPL-CCNC: 24 U/L (ref 12–45)
BILIRUB SERPL-MCNC: 0.9 MG/DL (ref 0.1–1.5)
BUN SERPL-MCNC: 11 MG/DL (ref 8–22)
CALCIUM ALBUM COR SERPL-MCNC: 9.8 MG/DL (ref 8.5–10.5)
CALCIUM SERPL-MCNC: 9 MG/DL (ref 8.5–10.5)
CHLORIDE SERPL-SCNC: 100 MMOL/L (ref 96–112)
CO2 SERPL-SCNC: 28 MMOL/L (ref 20–33)
CREAT SERPL-MCNC: 0.51 MG/DL (ref 0.5–1.4)
ERYTHROCYTE [DISTWIDTH] IN BLOOD BY AUTOMATED COUNT: 41.1 FL (ref 35.9–50)
GFR SERPLBLD CREATININE-BSD FMLA CKD-EPI: 107 ML/MIN/1.73 M 2
GLOBULIN SER CALC-MCNC: 3.1 G/DL (ref 1.9–3.5)
GLUCOSE SERPL-MCNC: 121 MG/DL (ref 65–99)
HCT VFR BLD AUTO: 38.5 % (ref 42–52)
HGB BLD-MCNC: 13.6 G/DL (ref 14–18)
MAGNESIUM SERPL-MCNC: 2.2 MG/DL (ref 1.5–2.5)
MCH RBC QN AUTO: 35.3 PG (ref 27–33)
MCHC RBC AUTO-ENTMCNC: 35.3 G/DL (ref 32.3–36.5)
MCV RBC AUTO: 100 FL (ref 81.4–97.8)
NT-PROBNP SERPL IA-MCNC: 2477 PG/ML (ref 0–125)
PHOSPHATE SERPL-MCNC: 2.7 MG/DL (ref 2.5–4.5)
PLATELET # BLD AUTO: 253 K/UL (ref 164–446)
PMV BLD AUTO: 9.8 FL (ref 9–12.9)
POTASSIUM SERPL-SCNC: 3.1 MMOL/L (ref 3.6–5.5)
POTASSIUM SERPL-SCNC: 3.1 MMOL/L (ref 3.6–5.5)
PROT SERPL-MCNC: 6.1 G/DL (ref 6–8.2)
RBC # BLD AUTO: 3.85 M/UL (ref 4.7–6.1)
SODIUM SERPL-SCNC: 141 MMOL/L (ref 135–145)
WBC # BLD AUTO: 7.2 K/UL (ref 4.8–10.8)

## 2023-10-13 PROCEDURE — 84100 ASSAY OF PHOSPHORUS: CPT

## 2023-10-13 PROCEDURE — 83880 ASSAY OF NATRIURETIC PEPTIDE: CPT

## 2023-10-13 PROCEDURE — 92526 ORAL FUNCTION THERAPY: CPT

## 2023-10-13 PROCEDURE — 05HY33Z INSERTION OF INFUSION DEVICE INTO UPPER VEIN, PERCUTANEOUS APPROACH: ICD-10-PCS | Performed by: HOSPITALIST

## 2023-10-13 PROCEDURE — C1751 CATH, INF, PER/CENT/MIDLINE: HCPCS

## 2023-10-13 PROCEDURE — 700111 HCHG RX REV CODE 636 W/ 250 OVERRIDE (IP): Mod: JZ | Performed by: INTERNAL MEDICINE

## 2023-10-13 PROCEDURE — 700102 HCHG RX REV CODE 250 W/ 637 OVERRIDE(OP): Performed by: HOSPITALIST

## 2023-10-13 PROCEDURE — 700111 HCHG RX REV CODE 636 W/ 250 OVERRIDE (IP): Performed by: INTERNAL MEDICINE

## 2023-10-13 PROCEDURE — 700111 HCHG RX REV CODE 636 W/ 250 OVERRIDE (IP): Performed by: HOSPITALIST

## 2023-10-13 PROCEDURE — 83735 ASSAY OF MAGNESIUM: CPT

## 2023-10-13 PROCEDURE — 700102 HCHG RX REV CODE 250 W/ 637 OVERRIDE(OP): Performed by: INTERNAL MEDICINE

## 2023-10-13 PROCEDURE — 80053 COMPREHEN METABOLIC PANEL: CPT

## 2023-10-13 PROCEDURE — A9270 NON-COVERED ITEM OR SERVICE: HCPCS | Performed by: INTERNAL MEDICINE

## 2023-10-13 PROCEDURE — 51798 US URINE CAPACITY MEASURE: CPT

## 2023-10-13 PROCEDURE — 99233 SBSQ HOSP IP/OBS HIGH 50: CPT | Performed by: HOSPITALIST

## 2023-10-13 PROCEDURE — 700105 HCHG RX REV CODE 258: Performed by: HOSPITALIST

## 2023-10-13 PROCEDURE — 82140 ASSAY OF AMMONIA: CPT

## 2023-10-13 PROCEDURE — 92612 ENDOSCOPY SWALLOW (FEES) VID: CPT

## 2023-10-13 PROCEDURE — 85027 COMPLETE CBC AUTOMATED: CPT

## 2023-10-13 PROCEDURE — 700111 HCHG RX REV CODE 636 W/ 250 OVERRIDE (IP): Mod: JZ | Performed by: STUDENT IN AN ORGANIZED HEALTH CARE EDUCATION/TRAINING PROGRAM

## 2023-10-13 PROCEDURE — 770020 HCHG ROOM/CARE - TELE (206)

## 2023-10-13 PROCEDURE — A9270 NON-COVERED ITEM OR SERVICE: HCPCS | Performed by: HOSPITALIST

## 2023-10-13 RX ORDER — ONDANSETRON 4 MG/1
4 TABLET, ORALLY DISINTEGRATING ORAL EVERY 4 HOURS PRN
Status: DISCONTINUED | OUTPATIENT
Start: 2023-10-13 | End: 2023-10-16

## 2023-10-13 RX ORDER — POLYETHYLENE GLYCOL 3350 17 G/17G
1 POWDER, FOR SOLUTION ORAL
Status: DISCONTINUED | OUTPATIENT
Start: 2023-10-13 | End: 2023-10-16

## 2023-10-13 RX ORDER — ACETAMINOPHEN 325 MG/1
650 TABLET ORAL EVERY 6 HOURS PRN
Status: DISCONTINUED | OUTPATIENT
Start: 2023-10-13 | End: 2023-10-16

## 2023-10-13 RX ORDER — FOLIC ACID 1 MG/1
1 TABLET ORAL DAILY
Status: DISCONTINUED | OUTPATIENT
Start: 2023-10-14 | End: 2023-10-16

## 2023-10-13 RX ORDER — AMOXICILLIN AND CLAVULANATE POTASSIUM 875; 125 MG/1; MG/1
1 TABLET, FILM COATED ORAL EVERY 12 HOURS
Status: DISCONTINUED | OUTPATIENT
Start: 2023-10-13 | End: 2023-10-13

## 2023-10-13 RX ORDER — LORAZEPAM 0.5 MG/1
0.5 TABLET ORAL EVERY 4 HOURS PRN
Status: DISCONTINUED | OUTPATIENT
Start: 2023-10-13 | End: 2023-10-16

## 2023-10-13 RX ORDER — LOSARTAN POTASSIUM 50 MG/1
50 TABLET ORAL DAILY
Status: DISCONTINUED | OUTPATIENT
Start: 2023-10-14 | End: 2023-10-16

## 2023-10-13 RX ORDER — POTASSIUM CHLORIDE 20 MEQ/1
40 TABLET, EXTENDED RELEASE ORAL ONCE
Status: DISCONTINUED | OUTPATIENT
Start: 2023-10-13 | End: 2023-10-13

## 2023-10-13 RX ORDER — ROSUVASTATIN CALCIUM 20 MG/1
20 TABLET, COATED ORAL EVERY EVENING
Status: DISCONTINUED | OUTPATIENT
Start: 2023-10-13 | End: 2023-10-16

## 2023-10-13 RX ORDER — LORAZEPAM 0.5 MG/1
0.5 TABLET ORAL EVERY 4 HOURS PRN
Status: DISCONTINUED | OUTPATIENT
Start: 2023-10-13 | End: 2023-10-13

## 2023-10-13 RX ORDER — AMOXICILLIN AND CLAVULANATE POTASSIUM 875; 125 MG/1; MG/1
1 TABLET, FILM COATED ORAL EVERY 12 HOURS
Status: COMPLETED | OUTPATIENT
Start: 2023-10-13 | End: 2023-10-16

## 2023-10-13 RX ORDER — POTASSIUM CHLORIDE 7.45 MG/ML
10 INJECTION INTRAVENOUS
Status: COMPLETED | OUTPATIENT
Start: 2023-10-13 | End: 2023-10-13

## 2023-10-13 RX ORDER — VALPROIC ACID 250 MG/5ML
250 SOLUTION ORAL EVERY 8 HOURS
Status: DISCONTINUED | OUTPATIENT
Start: 2023-10-13 | End: 2023-10-16

## 2023-10-13 RX ORDER — DULOXETIN HYDROCHLORIDE 30 MG/1
30 CAPSULE, DELAYED RELEASE ORAL DAILY
Status: DISCONTINUED | OUTPATIENT
Start: 2023-10-14 | End: 2023-10-16

## 2023-10-13 RX ORDER — AMOXICILLIN 250 MG
2 CAPSULE ORAL 2 TIMES DAILY
Status: DISCONTINUED | OUTPATIENT
Start: 2023-10-13 | End: 2023-10-16

## 2023-10-13 RX ORDER — BISACODYL 10 MG
10 SUPPOSITORY, RECTAL RECTAL
Status: DISCONTINUED | OUTPATIENT
Start: 2023-10-13 | End: 2023-10-16

## 2023-10-13 RX ADMIN — CLOBETASOL PROPIONATE CREAM USP, 0.05%: 0.5 CREAM TOPICAL at 17:49

## 2023-10-13 RX ADMIN — THERA TABS 1 TABLET: TAB at 06:08

## 2023-10-13 RX ADMIN — QUETIAPINE FUMARATE 25 MG: 25 TABLET ORAL at 20:44

## 2023-10-13 RX ADMIN — FOLIC ACID 1 MG: 1 TABLET ORAL at 06:05

## 2023-10-13 RX ADMIN — LORAZEPAM 1 MG: 2 INJECTION INTRAMUSCULAR; INTRAVENOUS at 00:17

## 2023-10-13 RX ADMIN — POTASSIUM CHLORIDE 10 MEQ: 7.46 INJECTION, SOLUTION INTRAVENOUS at 06:20

## 2023-10-13 RX ADMIN — DULOXETINE HYDROCHLORIDE 30 MG: 30 CAPSULE, DELAYED RELEASE ORAL at 06:05

## 2023-10-13 RX ADMIN — VALPROIC ACID 250 MG: 250 SOLUTION ORAL at 02:18

## 2023-10-13 RX ADMIN — LORAZEPAM 0.5 MG: 0.5 TABLET ORAL at 21:51

## 2023-10-13 RX ADMIN — VALPROIC ACID 250 MG: 250 SOLUTION ORAL at 20:27

## 2023-10-13 RX ADMIN — VALPROIC ACID 250 MG: 250 SOLUTION ORAL at 09:56

## 2023-10-13 RX ADMIN — DOCUSATE SODIUM 50 MG AND SENNOSIDES 8.6 MG 2 TABLET: 8.6; 5 TABLET, FILM COATED ORAL at 20:27

## 2023-10-13 RX ADMIN — ENOXAPARIN SODIUM 40 MG: 100 INJECTION SUBCUTANEOUS at 17:49

## 2023-10-13 RX ADMIN — FUROSEMIDE 20 MG: 10 INJECTION, SOLUTION INTRAVENOUS at 05:16

## 2023-10-13 RX ADMIN — LOSARTAN POTASSIUM 50 MG: 50 TABLET, FILM COATED ORAL at 06:04

## 2023-10-13 RX ADMIN — POTASSIUM CHLORIDE 10 MEQ: 7.46 INJECTION, SOLUTION INTRAVENOUS at 03:52

## 2023-10-13 RX ADMIN — POTASSIUM CHLORIDE 10 MEQ: 7.46 INJECTION, SOLUTION INTRAVENOUS at 07:40

## 2023-10-13 RX ADMIN — AMOXICILLIN AND CLAVULANATE POTASSIUM 1 TABLET: 875; 125 TABLET, FILM COATED ORAL at 20:27

## 2023-10-13 RX ADMIN — POTASSIUM CHLORIDE 10 MEQ: 7.46 INJECTION, SOLUTION INTRAVENOUS at 05:19

## 2023-10-13 RX ADMIN — FUROSEMIDE 20 MG: 10 INJECTION, SOLUTION INTRAVENOUS at 17:48

## 2023-10-13 RX ADMIN — CLOBETASOL PROPIONATE CREAM USP, 0.05%: 0.5 CREAM TOPICAL at 06:13

## 2023-10-13 RX ADMIN — POTASSIUM BICARBONATE 25 MEQ: 978 TABLET, EFFERVESCENT ORAL at 20:27

## 2023-10-13 RX ADMIN — AMPICILLIN AND SULBACTAM 3 G: 1; 2 INJECTION, POWDER, FOR SOLUTION INTRAMUSCULAR; INTRAVENOUS at 08:32

## 2023-10-13 RX ADMIN — POTASSIUM BICARBONATE 25 MEQ: 978 TABLET, EFFERVESCENT ORAL at 12:33

## 2023-10-13 RX ADMIN — ROSUVASTATIN CALCIUM 20 MG: 20 TABLET, FILM COATED ORAL at 20:27

## 2023-10-13 ASSESSMENT — ENCOUNTER SYMPTOMS
GASTROINTESTINAL NEGATIVE: 1
NERVOUS/ANXIOUS: 1
MUSCULOSKELETAL NEGATIVE: 1
SHORTNESS OF BREATH: 1
EYES NEGATIVE: 1
NEUROLOGICAL NEGATIVE: 1
COUGH: 1
CARDIOVASCULAR NEGATIVE: 1

## 2023-10-13 ASSESSMENT — PAIN DESCRIPTION - PAIN TYPE: TYPE: ACUTE PAIN

## 2023-10-13 ASSESSMENT — LIFESTYLE VARIABLES: SUBSTANCE_ABUSE: 1

## 2023-10-13 NOTE — PROGRESS NOTES
Pt transfer from [facility] to T730 with respiratory failure. Pt is oriented to person and place, disoriented to time and situation. VSS on 2L.

## 2023-10-13 NOTE — THERAPY
Speech Language Pathology   Daily Treatment     Patient Name: Balwinder Grimes  AGE:  72 y.o., SEX:  male  Medical Record #: [id number]  Date of Service: 10/13/2023      Precautions:  Precautions: Fall Risk, Swallow Precautions     Pt is a 72 y.o. M w/ h/o HTN, BPH, HLD, ETOH abuse, essential tremor, admitted following syncopal episode with fall and head strike. CT negative. Pt dx with anemia, alcohol withdrawal. Intubated 10/9/23 - 10/10/23.    Subjective  Patient cleared by RN for session. Pt received awake, A&Ox3, improved mentation/ participation compared to yesterday's session.       Assessment  Patient seen on this date for dysphagia therapy with focus on re-evaluating readiness for oral diet vs diagnostic swallow study. Oral care provided by RN. PO trials of ice chips, thin liquids and liquidized assessed. Intermittent throat clearing appreciated with PO intake. Wet/gurgly vocal quality noted with trials of thin liquids, improved with cued cough and double swallow. Pt would benefit from diagnostic swallow study to objectively assess swallow function and informed POC.       Clinical Impressions  Patient presents with clinical indicators and is at high risk for oropharyngeal dysphagia given endotracheal intubation, prolonged NPO status and AMS. Pt would benefit from instrumental swallow study to objectively assess swallow function. FEES tentatively scheduled for 1300.       Recommendations  Treatment Completed: Dysphagia Treatment       Dysphagia Treatment  Diet Consistency: NPO, pre-feeding trials with SLP only              -Clear for minimal ice chips/hour and sips of water, 1:1 w/RN, when alert, after oral care to reduce xerostomia and mitigate disuse atrophy of swallow musculature  Instrumentation: FEES  Medication: Crush with applesauce, as appropriate  Oral Care: Q4h                     SLP Treatment Plan  Treatment Plan: Dysphagia Treatment  SLP Frequency: 3x Per Week  Estimated Duration: Until Therapy  "Goals Met      Anticipated Discharge Needs  Discharge Recommendations: Recommend post-acute placement for additional speech therapy services prior to discharge home  Therapy Recommendations Upon DC: Dysphagia Training, Patient / Family / Caregiver Education      Patient / Family Goals  Patient / Family Goal #1: \"water\"  Goal #1 Outcome: Progressing slower than expected  Short Term Goals  Short Term Goal # 1: Patient will participate in pre-feeding trials with SLP only with no overt s/sx of aspiration  Goal Outcome # 1: Goal met, new goal added  Short Term Goal # 1 B : Patient will participate in instrumental swallow study to objectively assess swallow function and informed POC      Maria Ines Chavez MS,CCC-SLP    "

## 2023-10-13 NOTE — PROGRESS NOTES
Hospital Medicine Daily Progress Note    Date of Service  10/13/2023    Chief Complaint  Balwinder Grimes is a 72 y.o. male admitted 10/4/2023 with fall, alcohol abuse    Hospital Course  72 y.o. male who presented 10/4/2023 with a past med history significant for hypertension, dyslipidemia, alcohol abuse, admitted to the hospital in October for after syncopal event resulting in forehead trauma, the patient had complained of increasing dizziness for the last 2 weeks, had accidentally taken double dose of doxazosin reportedly, reported a recent discharge from alcohol rehab and told that he had been sober for 10 days, he had an elevated alcohol level on admission, he was admitted to the [facility] family medicine service for syncope work-up and alcohol withdrawal, reportedly the patient was aggressively treated for tremors and suppose it CIWA scores with elevation, received 20 mg of Ativan, subsequently found obtunded and hypoxic requiring escalation to the ICU level of care, had a large amount of oral secretions, suctioned, chest x-ray positive for bilateral lower lobe infiltrates, was given flumazenil with some level of improvement, moved to the ICU level of care for the patient was intubated, successfully extubated on 10/10/2023, the patient remains significantly confused, appears sedate, wet breath sounds, chest x-ray with right greater than left effusion, infiltrates, currently not a historian  Per the patient's sister the patient has been recently deteriorating in terms of his alcohol intake reportedly.    Interval Problem Update  Patient seen and examined today.  Data, Medication data reviewed.  Case discussed with nursing as available.  Plan of Care reviewed with patient and notified of changes.   10/12 patient reportedly awake all night, constantly attempting to remove catheters, lines, agitated, medicated with Haldol, tachycardic during his agitation episodes, on my examination the patient finally fell  asleep,  Currently afebrile, heart rate in the 90s, respiration unlabored, rhonchorous, saturating 92% on 2 L nasal cannula oxygen,  With a count of 6.8, hemoglobin 13.4, hematocrit 38, platelet count 225,  Sodium 141, potassium 2.9, chloride 99, bicarb 30, glucose 118, BUN is 8, creatinine 0.68,  BNP is 2401  10/13 the patient appears improved today, drowsy and somewhat lethargic but responds adequately, afebrile, heart rate in the 70s to 80s, respiration unlabored, currently on 2 L nasal cannula oxygen saturating in the low to mid 90s, blood pressure in the 1 teens to 160s over 80s  The patient was reevaluated by FEES and unfortunately failed    I have discussed this patient's plan of care and discharge plan at IDT rounds today with Case Management, Nursing, Nursing leadership, and other members of the IDT team.    Consultants/Specialty  critical care    Code Status  Full Code    Disposition  The patient is not medically cleared for discharge to home or a post-acute facility.  Anticipate discharge to: home with close outpatient follow-up    I have placed the appropriate orders for post-discharge needs.    Review of Systems  Review of Systems   Unable to perform ROS: Mental acuity   Constitutional:  Positive for malaise/fatigue.   HENT: Negative.     Eyes: Negative.    Respiratory:  Positive for cough and shortness of breath.    Cardiovascular: Negative.    Gastrointestinal: Negative.    Genitourinary: Negative.    Musculoskeletal: Negative.    Skin: Negative.    Neurological: Negative.    Endo/Heme/Allergies: Negative.    Psychiatric/Behavioral:  Positive for substance abuse. The patient is nervous/anxious.    All other systems reviewed and are negative.       Physical Exam  Temp:  [36.6 °C (97.8 °F)-37.3 °C (99.1 °F)] 36.6 °C (97.8 °F)  Pulse:  [] 76  Resp:  [16-51] 16  BP: (113-158)/(61-84) 144/72  SpO2:  [92 %-97 %] 97 %    Physical Exam  Vitals and nursing note reviewed.   Constitutional:        Appearance: He is well-developed.      Comments: Pt seen and examined.  Lethargic, easily arousable   HENT:      Head: Normocephalic and atraumatic.      Comments: Facial rash  Eyes:      Pupils: Pupils are equal, round, and reactive to light.   Cardiovascular:      Rate and Rhythm: Normal rate and regular rhythm.      Heart sounds: Normal heart sounds.   Pulmonary:      Effort: Pulmonary effort is normal.      Breath sounds: Rhonchi and rales present.   Abdominal:      General: Bowel sounds are normal.      Palpations: Abdomen is soft.   Musculoskeletal:         General: Normal range of motion.      Cervical back: Normal range of motion and neck supple.   Skin:     General: Skin is warm and dry.      Findings: Bruising, erythema and rash present.   Neurological:      General: No focal deficit present.      Mental Status: He is disoriented.   Psychiatric:      Comments: Unable to assess         Fluids    Intake/Output Summary (Last 24 hours) at 10/13/2023 0804  Last data filed at 10/13/2023 0000  Gross per 24 hour   Intake 423.55 ml   Output 1250 ml   Net -826.45 ml         Laboratory  Recent Labs     10/11/23  0137 10/12/23  0354 10/13/23  0244   WBC 6.8 6.8 7.2   RBC 3.28* 3.78* 3.85*   HEMOGLOBIN 11.6* 13.4* 13.6*   HEMATOCRIT 33.8* 38.0* 38.5*   .0* 100.5* 100.0*   MCH 35.4* 35.4* 35.3*   MCHC 34.3 35.3 35.3   RDW 43.8 41.5 41.1   PLATELETCT 171 225 253   MPV 10.5 10.1 9.8       Recent Labs     10/11/23  1510 10/12/23  0354 10/12/23  2329 10/13/23  0244   SODIUM 141 141  --  141   POTASSIUM 3.0* 2.9* 3.1* 3.1*   CHLORIDE 100 99  --  100   CO2 28 30  --  28   GLUCOSE 121* 118*  --  121*   BUN 6* 8  --  11   CREATININE 0.57 0.68  --  0.51   CALCIUM 8.0* 8.3*  --  9.0                     Imaging  DX-CHEST-PORTABLE (1 VIEW)   Final Result      RIGHT larger than LEFT pleural effusion with overlying atelectasis and/or airspace disease and suspected superimposed pulmonary edema, increased since prior       EC-ECHOCARDIOGRAM COMPLETE W/O CONT   Final Result      DX-CHEST-PORTABLE (1 VIEW)   Final Result      RIGHT larger than LEFT pleural effusion with overlying atelectasis and/or airspace disease and suspected superimposed pulmonary edema, unchanged      DX-ABDOMEN FOR TUBE PLACEMENT   Final Result         Gastric drainage tube with tip projecting over the expected area of the stomach.      DX-ABDOMEN FOR TUBE PLACEMENT   Final Result         Gastric drainage tube coiled in the stomach with tip projecting over the expected area of the distal stomach.      DX-CHEST-PORTABLE (1 VIEW)   Final Result      1.  Likely layering bilateral pleural effusions.   2.  Interstitial infiltrates and/or edema.   3.  Support apparatus as above.      DX-CHEST-PORTABLE (1 VIEW)   Final Result      1.  Pulmonary edema. Multifocal pneumonia could have a similar appearance.   2.  Likely small/moderate bilateral pleural effusions.      DX-CHEST-PORTABLE (1 VIEW)   Final Result      No acute cardiopulmonary abnormality.      CT-CSPINE WITHOUT PLUS RECONS   Final Result      No acute fracture or dislocation of the cervical spine.      CT-HEAD W/O   Final Result      1.  Cerebral atrophy.      2.  White matter lucencies most consistent with small vessel ischemic change versus demyelination or gliosis.      3.  Otherwise, Head CT without contrast with no acute findings. No evidence of acute cerebral infarction, hemorrhage or mass lesion.         IR-US GUIDED PIV    (Results Pending)        Assessment/Plan  * Respiratory failure requiring intubation (HCC)- (present on admission)  Assessment & Plan  Secondary to aspiration pneumonitis, atelectasis. altered mental status, alcohol withdrawal with delirium, hypercarbic respiratory failure, benzodiazepine overdose  Telemetry monitoring   Continuous pulse oximetry   Goal saturation >92%   Head of bed > 30 degree   GI prophylaxis   Respiratory treatments: prn       Urinary tract infection- (present on  admission)  Assessment & Plan  Febrile with flank pain  Urine Cx (10/8) - E. Faecalis, sensitive to ampicillin, nitrofurantoin, penicillin  Antibiotics for 5-day course    Aspiration pneumonitis (HCC)- (present on admission)  Assessment & Plan  Secondary to alcohol withdrawal syndrome  Blood cultures, sputum culture, procalcitonin  Antibiotics, secretion management  Ongoing aspiration precautions, SLP evaluation  Fiberoptic exam on 10/13 leaves the patient still in n.p.o. status    Alcohol withdrawal syndrome, with delirium (HCC)- (present on admission)  Assessment & Plan  Admitted on 10/6, reported drinks 1-2 bottles of wine  Transferred to ICU for Precedex drip with scheduled and as needed Ativan monitoring RASS  Aspiration and seizure precautions  Continue vitamin supplementation  Eventual alcohol cessation education and resources to be provided when appropriate    Oropharyngeal dysphagia  Assessment & Plan  The patient will need to have a feeding tube placement tube feeds    Fever- (present on admission)  Assessment & Plan  Respiratory panel negative, although patient at risk of respiratory infection, aspiration  -UA and urine culture obtained with copeland placement on 10/8/23 due to flank pain   Positive culture, recheck blood cultures, ongoing antibiotic treatment    Conjunctivitis of both eyes- (present on admission)  Assessment & Plan  Bilateral conjunctivitis of the eye, with dried discharge on the eyelids.   Trimethoprim-polymyxin four times daily for 7 days    Elevated troponin- (present on admission)  Assessment & Plan  Secondary to demand ischemia    Macrocytic anemia- (present on admission)  Assessment & Plan  Pt with hemoglobin of 12.7 and MCV at 104.5 on admission; Likely due to heavy alcohol use.   - Continue to monitor CBC    - Continue multivitamin and thiamine supplement     Thrombocytopenia (HCC)- (present on admission)  Assessment & Plan  Slowly improving, likely alcohol related  Monitor with  daily CBC    Essential tremor- (present on admission)  Assessment & Plan  Monitor    Hypokalemia- (present on admission)  Assessment & Plan  Replete and monitor closely    Syncope and collapse- (present on admission)  Assessment & Plan  Admitted for syncope 10/4, likely alcohol related  Echo (10/10/23): EF 55%, no valvular abnormalities  Continuous telemetry monitoring  Fall precautions  PT/OT    Neuropathy- (present on admission)  Assessment & Plan  Continue Cymbalta    Hyperlipidemia- (present on admission)  Assessment & Plan  Continue rosuvastatin    Benign prostatic hyperplasia with urinary retention- (present on admission)  Assessment & Plan  Continue doxazosin    Primary hypertension- (present on admission)  Assessment & Plan  Continue doxazosin, losartan as tolerated  Diuresis monitor     Plan  Antihypertensive regimen  Place feeding tube, tube feeds to start, ongoing dysphagia, failed fiberoptic evaluation  Ongoing antibiotic coverage to cover for aspiration pneumonia  Ongoing Lasix, supportive care, potassium replacement, electrolyte replacement with monitoring  Mild sedatives, as needed  Close laboratory follow-up  Hemodynamic monitoring  See orders  Patient is has a high medical complexity, complex decision making and is at high risk for complication, morbidity, and mortality.  My total time spent caring for the patient on the day of the encounter was 56 minutes.   This does not include time spent on separately billable procedures/tests.    VTE prophylaxis:    enoxaparin ppx      I have performed a physical exam and reviewed and updated ROS and Plan today (10/13/2023). In review of yesterday's note (10/12/2023), there are no changes except as documented above.      Please note that this dictation was created using voice recognition software. I have made every reasonable attempt to correct obvious errors, but I expect that there are errors of grammar and possibly context that I did not discover before  finalizing the note.

## 2023-10-13 NOTE — THERAPY
Speech Language Pathology   Flexible Endoscopic Evaluation of Swallowing (FEES)        Patient Name: Balwinder Grimes  AGE:  72 y.o., SEX:  male  Medical Record #: [id number]  Date of Service: 10/13/2023      History of Present Illness  Pt is a 72 y.o. M w/ h/o HTN, BPH, HLD, ETOH abuse, essential tremor, admitted following syncopal episode with fall and head strike. CT negative. Pt dx with anemia, alcohol withdrawal.  Intubated 10/9/23 - 10/10/23.     No hx of ST in EMR.      CT HEAD 10/4/23:   1.  Cerebral atrophy.  2.  White matter lucencies most consistent with small vessel ischemic change versus demyelination or gliosis.  3.  Otherwise, Head CT without contrast with no acute findings. No evidence of acute cerebral infarction, hemorrhage or mass lesion.     CXR 10/9/23:   1.  Pulmonary edema. Multifocal pneumonia could have a similar appearance.  2.  Likely small/moderate bilateral pleural effusions.     CXR 10/10/23  RIGHT larger than LEFT pleural effusion with overlying atelectasis and/or airspace disease and suspected superimposed pulmonary edema, increased since prior         Pertinent Information  Current Method of Nutrition: NPO until cleared by speech pathology  Patient Behaviors: Confused, Flat Affect, Forgetful, Uncooperative, Withdrawn   Dentition: Fair   Feeding Tube: None   Tracheostomy: No              Factor(s) Affecting Performance: Impaired endurance, Impaired mental status, Impaired command following     Discussed the risks, benefits, and alternatives of the FEES procedure. Patient/family acknowledged and agreed to proceed.      Assessment  Flexible Endoscopic Evaluation of Swallowing (FEES) completed at bedside today. The endoscope was passed transnasally via Right nare to evaluate the anatomy and physiology of swallowing. Pt tolerated the procedure with no apparent distress.    Anatomic Findings:  Enlargement of arytenoids, generally red and edematous pharynx, space occupying lesions on  posterior 1/3 vocal folds.          Vocal Fold Motion: Bilateral movement  Secretion Management: Excess secretions  PO Trials: Ice Chips, Thin Liquid, Mildly Thick Liquid, Liquidised, Pudding      Consistency PAS Score Timing Residue Comments   Thin Liquid 5 During swallow (inferred), Post swallow Vallecular Residue: Mild (5%-25%)  Pyriform Sinus Residue: Mild (5%-25%) Tsp, cup     Mildly Thick 5 During Swallow (inferred), Post Swallow Vallecular Residue: Moderate (25%-50%)  Pyriform Sinus Residue: Mild (5%-25%) Tsp, cup, straw   Liquidised 5 During Swallow (inferred), Post Swallow Vallecular Residue: Moderate (25%-50%)  Pyriform Sinus Residue: Mild (5%-25%)    Pudding 5 During Swallow (inferred), Post Swallow Vallecular Residue: Moderate (25%-50%)  Pyriform Sinus Residue: Mild (5%-25%)      Penetration-Aspiration Scale (PAS)  1     No contrast enters airway  2     Contrast enters the airway, remains above the vocal folds, and is ejected from the airway (not seen in the airway at the end of the swallow).  3     Contrast enters the airway, remains above the vocal folds, and is not ejected from the airway (is seen in the airway after the swallow).  4     Contrast enters the airway, contacts the vocal folds, and is ejected from the airway.  5     Contrast enters the airway, contacts the vocal folds, and is not ejected from the airway  6     Contrast enters the airway, crosses the plane of the vocal folds, and is ejected from the airway.  7     Contrast enters the airway, crosses the plane of the vocal folds, and is not ejected from the airway despite effort.  8     Contrast enters the airway, crosses the plane of the vocal folds, is not ejected from the airway and there is no response to aspiration.      Oral phase: Pt needing max cues for oral acceptance. Oral bolus hold with majority of trials, needing verbal cues to swallow.       Pharyngeal phase:  -Impaired laryngeal vestibular closure resulted in penetration  during (inferred) the swallow  to the vocal fold across all trials. Pt re-penetrated bolus mixed with secretions after the swallow from interarytenoid space. Pt was not sensate to penetration. Cued cough/ throat clear intermittently effective to clear penetrate. Cannot rule out aspiration below the vocal folds given intermittent coughing with poor visualization 2/2 excess secretions.  -Impaired BoT retraction and epiglottic inversion resulted in mild vallecular residue with trials of thin liquids. Moderate vallecular residue with trials of mildly thick, liquidized and purees.  -Impaired pharyngeal shortening/constriction resulted in mild diffuse residue across all trials, solids > thins. Pt unable to fully clear despite cues to complete multiple swallows.      Compensatory Strategies:  -Cued cough/ throat clear - somewhat effective to clear penetrate, ?aspirate  -Cued cleansing swallow - somewhat effective to clear residue    **Given AMS, pt was not able to follow commands/ swallowing strategies    Severity Rating:  Severity Rating: RUSS     RUSS: Moderate-Severe      Clinical Impressions  The pt presents with a moderate-severe oropharyngeal dysphagia, suspect acute 2/2 endotracheal intubation, prolonged NPO status and AMS. Swallow safety and efficiency are both impaired. Pt is high risk for aspiration PNA. Recommend alternate source of nutrition/hydration/meds. Patient is a fair candidate for behavioral and exercise-based swallow rehabilitation. Attempted to educate patient on swallowing exercises; however, is not stimulable d/t AMS. A repeat swallowing diagnostic study (MBSS) is indicated prior to oral diet initiation.       Recommendations  Diet Consistency: NPO, pre-feeding trials with SLP only   -Clear for minimal ice chips/hour 1:1 w/RN, when alert, after oral care to reduce xerostomia and mitigate disuse atrophy of swallow musculature  Medication: Non Oral  Oral Care: Q4h  Additional Instrumentation: VFSS  "(MBSS)  Consult Referral(s): Gastroenterologist      SLP Treatment Plan  Treatment Plan: Dysphagia Treatment  SLP Frequency: 4x Per Week  Estimated Duration: Until Therapy Goals Met      Anticipated Discharge Needs  Discharge Recommendations: Recommend post-acute placement for additional speech therapy services prior to discharge home   Therapy Recommendations Upon DC: Dysphagia Training, Patient / Family / Caregiver Education       Patient / Family Goals  Patient / Family Goal #1: \"water\"  Goal #1 Outcome: Progressing slower than expected  Short Term Goal # 1: Patient will participate in pre-feeding trials with SLP only with no overt s/sx of aspiration  Goal Outcome # 1: Goal met, new goal added  Short Term Goal # 1 B : Patient will participate in instrumental swallow study to objectively assess swallow function and informed POC  Goal Outcome  # 1 B: Goal not met  Short Term Goal # 2: Pt will complete 20 reps each of swallow exercises targeting pharyngeal shortening, laryngeal vestibule closure and base of tongue retraction with \"good\" acuracy      Maria Ines Chavez MS,CCC-SLP    "

## 2023-10-13 NOTE — CARE PLAN
The patient is Watcher - Medium risk of patient condition declining or worsening    Shift Goals  Clinical Goals: decrease agitation, safety, monitor K lab, promote sleep  Patient Goals: MAXX  Family Goals: MAXX    Progress made toward(s) clinical / shift goals:       Problem: Optimal Care for Alcohol Withdrawal  Goal: Optimal Care for the alcohol withdrawal patient  Outcome: Progressing     Problem: Seizure Precautions  Goal: Implementation of seizure precautions  Outcome: Progressing  Seizure precautions in place.     Problem: Risk for Aspiration  Goal: Patient's risk for aspiration will be absent or decrease  Outcome: Progressing  HOB elevated at 30 degrees and greater     Problem: Fall Risk  Goal: Patient will remain free from falls  Outcome: Progressing  Bed alarm on, bed in low position and locked, call light within reach     Problem: Knowledge Deficit - Standard  Goal: Patient and family/care givers will demonstrate understanding of plan of care, disease process/condition, diagnostic tests and medications  Outcome: Progressing     Problem: Skin Integrity  Goal: Skin integrity is maintained or improved  Outcome: Progressing  Pt turns self from side to side     Problem: Pain - Standard  Goal: Alleviation of pain or a reduction in pain to the patient’s comfort goal  Outcome: Progressing       Patient is not progressing towards the following goals:

## 2023-10-13 NOTE — PROCEDURES
Vascular Access Team    Date of Insertion: 10/13/23  Arm Circumference: n/a  Line Length: 10  Line Size: 20  Vein Occupancy %: 32  Reason for Midline: access  Labs: WBC 7.2, , BUN 11, Cr 0,51, , INR na    Orders confirmed, vessel patency confirmed with ultrasound. Risks and benefits of procedure explained to patient and education regarding line associated bloodstream infections provided. Questions answered.     PowerGlide Midline placed in RUE per licensed provider order with ultrasound guidance. 20g, 10 cm line placed in cephalic vein after 2 attempt(s).  Catheter inserted with brisk blood return. Secured with 0cm external from insertion site.  Line flushed without resistance with 10 mL 0.9% normal saline.  Midline secured with Biopatch and Tegaderm.     Midline placement is confirmed by nurse using ultrasound and ability to flush and draw blood. Midline is appropriate for use at this time.  No X-ray is needed for placement confirmation. Pt tolerated procedure well.  Patient condition relayed to unit RN or ordering physician via this post procedure note in the EMR.    Ultrasound images uploaded to PACS and viewable in the EMR - yes  Ultrasound imaged printed and placed in paper chart - no      BARD PowerGlide Midline ref # [id number], Lot # [id number], Expiration Date 7/31/24

## 2023-10-14 LAB
ALBUMIN SERPL BCP-MCNC: 2.8 G/DL (ref 3.2–4.9)
ALBUMIN/GLOB SERPL: 0.9 G/DL
ALP SERPL-CCNC: 95 U/L (ref 30–99)
ALT SERPL-CCNC: 14 U/L (ref 2–50)
ANION GAP SERPL CALC-SCNC: 11 MMOL/L (ref 7–16)
AST SERPL-CCNC: 26 U/L (ref 12–45)
BILIRUB SERPL-MCNC: 0.6 MG/DL (ref 0.1–1.5)
BUN SERPL-MCNC: 15 MG/DL (ref 8–22)
CALCIUM ALBUM COR SERPL-MCNC: 9.7 MG/DL (ref 8.5–10.5)
CALCIUM SERPL-MCNC: 8.7 MG/DL (ref 8.5–10.5)
CHLORIDE SERPL-SCNC: 101 MMOL/L (ref 96–112)
CO2 SERPL-SCNC: 30 MMOL/L (ref 20–33)
CREAT SERPL-MCNC: 0.59 MG/DL (ref 0.5–1.4)
ERYTHROCYTE [DISTWIDTH] IN BLOOD BY AUTOMATED COUNT: 42.4 FL (ref 35.9–50)
GFR SERPLBLD CREATININE-BSD FMLA CKD-EPI: 103 ML/MIN/1.73 M 2
GLOBULIN SER CALC-MCNC: 3.2 G/DL (ref 1.9–3.5)
GLUCOSE SERPL-MCNC: 118 MG/DL (ref 65–99)
HCT VFR BLD AUTO: 39.3 % (ref 42–52)
HGB BLD-MCNC: 13.5 G/DL (ref 14–18)
MAGNESIUM SERPL-MCNC: 2.1 MG/DL (ref 1.5–2.5)
MCH RBC QN AUTO: 34.5 PG (ref 27–33)
MCHC RBC AUTO-ENTMCNC: 34.4 G/DL (ref 32.3–36.5)
MCV RBC AUTO: 100.5 FL (ref 81.4–97.8)
NT-PROBNP SERPL IA-MCNC: 1724 PG/ML (ref 0–125)
PHOSPHATE SERPL-MCNC: 2.9 MG/DL (ref 2.5–4.5)
PLATELET # BLD AUTO: 264 K/UL (ref 164–446)
PMV BLD AUTO: 10 FL (ref 9–12.9)
POTASSIUM SERPL-SCNC: 2.9 MMOL/L (ref 3.6–5.5)
PROT SERPL-MCNC: 6 G/DL (ref 6–8.2)
RBC # BLD AUTO: 3.91 M/UL (ref 4.7–6.1)
SODIUM SERPL-SCNC: 142 MMOL/L (ref 135–145)
WBC # BLD AUTO: 6.4 K/UL (ref 4.8–10.8)

## 2023-10-14 PROCEDURE — A9270 NON-COVERED ITEM OR SERVICE: HCPCS

## 2023-10-14 PROCEDURE — 700102 HCHG RX REV CODE 250 W/ 637 OVERRIDE(OP): Performed by: INTERNAL MEDICINE

## 2023-10-14 PROCEDURE — 84100 ASSAY OF PHOSPHORUS: CPT

## 2023-10-14 PROCEDURE — 700111 HCHG RX REV CODE 636 W/ 250 OVERRIDE (IP): Mod: JZ | Performed by: STUDENT IN AN ORGANIZED HEALTH CARE EDUCATION/TRAINING PROGRAM

## 2023-10-14 PROCEDURE — 83880 ASSAY OF NATRIURETIC PEPTIDE: CPT

## 2023-10-14 PROCEDURE — 83735 ASSAY OF MAGNESIUM: CPT

## 2023-10-14 PROCEDURE — 80053 COMPREHEN METABOLIC PANEL: CPT

## 2023-10-14 PROCEDURE — 700102 HCHG RX REV CODE 250 W/ 637 OVERRIDE(OP)

## 2023-10-14 PROCEDURE — 99233 SBSQ HOSP IP/OBS HIGH 50: CPT | Performed by: INTERNAL MEDICINE

## 2023-10-14 PROCEDURE — 85027 COMPLETE CBC AUTOMATED: CPT

## 2023-10-14 PROCEDURE — 700102 HCHG RX REV CODE 250 W/ 637 OVERRIDE(OP): Performed by: HOSPITALIST

## 2023-10-14 PROCEDURE — 770020 HCHG ROOM/CARE - TELE (206)

## 2023-10-14 PROCEDURE — 31720 CLEARANCE OF AIRWAYS: CPT

## 2023-10-14 PROCEDURE — 700111 HCHG RX REV CODE 636 W/ 250 OVERRIDE (IP): Performed by: INTERNAL MEDICINE

## 2023-10-14 PROCEDURE — 92526 ORAL FUNCTION THERAPY: CPT

## 2023-10-14 PROCEDURE — A9270 NON-COVERED ITEM OR SERVICE: HCPCS | Performed by: HOSPITALIST

## 2023-10-14 PROCEDURE — 700111 HCHG RX REV CODE 636 W/ 250 OVERRIDE (IP): Mod: JZ | Performed by: INTERNAL MEDICINE

## 2023-10-14 PROCEDURE — 36415 COLL VENOUS BLD VENIPUNCTURE: CPT

## 2023-10-14 PROCEDURE — 51798 US URINE CAPACITY MEASURE: CPT

## 2023-10-14 PROCEDURE — A9270 NON-COVERED ITEM OR SERVICE: HCPCS | Performed by: INTERNAL MEDICINE

## 2023-10-14 RX ORDER — POTASSIUM CHLORIDE 7.45 MG/ML
10 INJECTION INTRAVENOUS
Status: COMPLETED | OUTPATIENT
Start: 2023-10-14 | End: 2023-10-14

## 2023-10-14 RX ORDER — GAUZE BANDAGE 2" X 2"
100 BANDAGE TOPICAL DAILY
Status: DISCONTINUED | OUTPATIENT
Start: 2023-10-14 | End: 2023-10-16

## 2023-10-14 RX ADMIN — VALPROIC ACID 250 MG: 250 SOLUTION ORAL at 14:31

## 2023-10-14 RX ADMIN — DULOXETINE HYDROCHLORIDE 30 MG: 30 CAPSULE, DELAYED RELEASE ORAL at 05:37

## 2023-10-14 RX ADMIN — ROSUVASTATIN CALCIUM 20 MG: 20 TABLET, FILM COATED ORAL at 17:11

## 2023-10-14 RX ADMIN — FUROSEMIDE 20 MG: 10 INJECTION, SOLUTION INTRAVENOUS at 05:37

## 2023-10-14 RX ADMIN — VALPROIC ACID 250 MG: 250 SOLUTION ORAL at 05:37

## 2023-10-14 RX ADMIN — AMOXICILLIN AND CLAVULANATE POTASSIUM 1 TABLET: 875; 125 TABLET, FILM COATED ORAL at 17:11

## 2023-10-14 RX ADMIN — CLOBETASOL PROPIONATE CREAM USP, 0.05%: 0.5 CREAM TOPICAL at 17:27

## 2023-10-14 RX ADMIN — POTASSIUM BICARBONATE 50 MEQ: 978 TABLET, EFFERVESCENT ORAL at 17:11

## 2023-10-14 RX ADMIN — AMOXICILLIN AND CLAVULANATE POTASSIUM 1 TABLET: 875; 125 TABLET, FILM COATED ORAL at 05:37

## 2023-10-14 RX ADMIN — Medication 100 MG: at 17:11

## 2023-10-14 RX ADMIN — POTASSIUM CHLORIDE 10 MEQ: 7.46 INJECTION, SOLUTION INTRAVENOUS at 11:33

## 2023-10-14 RX ADMIN — LOSARTAN POTASSIUM 50 MG: 50 TABLET, FILM COATED ORAL at 05:37

## 2023-10-14 RX ADMIN — CLOBETASOL PROPIONATE CREAM USP, 0.05%: 0.5 CREAM TOPICAL at 07:23

## 2023-10-14 RX ADMIN — FOLIC ACID 1 MG: 1 TABLET ORAL at 05:37

## 2023-10-14 RX ADMIN — THERA TABS 1 TABLET: TAB at 05:37

## 2023-10-14 RX ADMIN — POTASSIUM BICARBONATE 50 MEQ: 978 TABLET, EFFERVESCENT ORAL at 05:37

## 2023-10-14 RX ADMIN — LORAZEPAM 0.5 MG: 0.5 TABLET ORAL at 05:37

## 2023-10-14 RX ADMIN — POTASSIUM CHLORIDE 10 MEQ: 7.46 INJECTION, SOLUTION INTRAVENOUS at 09:28

## 2023-10-14 RX ADMIN — VALPROIC ACID 250 MG: 250 SOLUTION ORAL at 21:35

## 2023-10-14 RX ADMIN — POTASSIUM CHLORIDE 10 MEQ: 7.46 INJECTION, SOLUTION INTRAVENOUS at 10:00

## 2023-10-14 RX ADMIN — FUROSEMIDE 20 MG: 10 INJECTION, SOLUTION INTRAVENOUS at 17:12

## 2023-10-14 RX ADMIN — ENOXAPARIN SODIUM 40 MG: 100 INJECTION SUBCUTANEOUS at 17:11

## 2023-10-14 RX ADMIN — POTASSIUM CHLORIDE 10 MEQ: 7.46 INJECTION, SOLUTION INTRAVENOUS at 12:00

## 2023-10-14 ASSESSMENT — LIFESTYLE VARIABLES: SUBSTANCE_ABUSE: 1

## 2023-10-14 ASSESSMENT — ENCOUNTER SYMPTOMS
COUGH: 1
NERVOUS/ANXIOUS: 1
MUSCULOSKELETAL NEGATIVE: 1
NEUROLOGICAL NEGATIVE: 1
CARDIOVASCULAR NEGATIVE: 1
SHORTNESS OF BREATH: 1
GASTROINTESTINAL NEGATIVE: 1
EYES NEGATIVE: 1

## 2023-10-14 ASSESSMENT — FIBROSIS 4 INDEX: FIB4 SCORE: 1.9

## 2023-10-14 NOTE — PROGRESS NOTES
Hospital Medicine Daily Progress Note    Date of Service  10/14/2023    Chief Complaint  Balwinder Grimes is a 72 y.o. male admitted 10/4/2023 with fall, alcohol abuse    Hospital Course  72 y.o. male who presented 10/4/2023 with a past med history significant for hypertension, dyslipidemia, alcohol abuse, admitted to the hospital in October for after syncopal event resulting in forehead trauma, the patient had complained of increasing dizziness for the last 2 weeks, had accidentally taken double dose of doxazosin reportedly, reported a recent discharge from alcohol rehab and told that he had been sober for 10 days, he had an elevated alcohol level on admission, he was admitted to the [facility] service for syncope work-up and alcohol withdrawal, reportedly the patient was aggressively treated for tremors and suppose it CIWA scores with elevation, received 20 mg of Ativan, subsequently found obtunded and hypoxic requiring escalation to the ICU level of care, had a large amount of oral secretions, suctioned, chest x-ray positive for bilateral lower lobe infiltrates, was given flumazenil with some level of improvement, moved to the ICU level of care for the patient was intubated, successfully extubated on 10/10/2023, the patient remains significantly confused, appears sedate, wet breath sounds, chest x-ray with right greater than left effusion, infiltrates, currently not a historian  Per the patient's sister the patient has been recently deteriorating in terms of his alcohol intake reportedly.    Interval Problem Update  Patient seen and examined today.  Data, Medication data reviewed.  Case discussed with nursing as available.  Plan of Care reviewed with patient and notified of changes.   10/12 patient reportedly awake all night, constantly attempting to remove catheters, lines, agitated, medicated with Haldol, tachycardic during his agitation episodes, on my examination the patient finally fell  asleep,  Currently afebrile, heart rate in the 90s, respiration unlabored, rhonchorous, saturating 92% on 2 L nasal cannula oxygen,  With a count of 6.8, hemoglobin 13.4, hematocrit 38, platelet count 225,  Sodium 141, potassium 2.9, chloride 99, bicarb 30, glucose 118, BUN is 8, creatinine 0.68,  BNP is 2401  10/13 the patient appears improved today, drowsy and somewhat lethargic but responds adequately, afebrile, heart rate in the 70s to 80s, respiration unlabored, currently on 2 L nasal cannula oxygen saturating in the low to mid 90s, blood pressure in the 1 teens to 160s over 80s  The patient was reevaluated by FEES and unfortunately failed  10/14: Patient seen and examined, confused, on restrains, now on tube feeds as he has failed FEES.   Also now having urinary retention so will place copeland still on 2-3 L of oxygen will vinny as tolerated.  Transferred from ICU overnight   I have discussed this patient's plan of care and discharge plan at IDT rounds today with Case Management, Nursing, Nursing leadership, and other members of the IDT team.    Consultants/Specialty  critical care    Code Status  Full Code    Disposition  The patient is not medically cleared for discharge to home or a post-acute facility.      I have placed the appropriate orders for post-discharge needs.    Review of Systems  Review of Systems   Unable to perform ROS: Mental acuity   Constitutional:  Positive for malaise/fatigue.   HENT: Negative.     Eyes: Negative.    Respiratory:  Positive for cough and shortness of breath.    Cardiovascular: Negative.    Gastrointestinal: Negative.    Genitourinary: Negative.    Musculoskeletal: Negative.    Skin: Negative.    Neurological: Negative.    Endo/Heme/Allergies: Negative.    Psychiatric/Behavioral:  Positive for substance abuse. The patient is nervous/anxious.    All other systems reviewed and are negative.       Physical Exam  Temp:  [36.5 °C (97.7 °F)-37.1 °C (98.8 °F)] 36.5 °C (97.7  °F)  Pulse:  [77-93] 77  Resp:  [18-20] 19  BP: (111-164)/(57-89) 130/66  SpO2:  [90 %-95 %] 93 %    Physical Exam  Vitals and nursing note reviewed.   Constitutional:       Appearance: He is well-developed.      Comments: Pt seen and examined.  Lethargic, easily arousable   HENT:      Head: Normocephalic and atraumatic.      Comments: Facial rash  Eyes:      Pupils: Pupils are equal, round, and reactive to light.   Cardiovascular:      Rate and Rhythm: Normal rate and regular rhythm.      Heart sounds: Normal heart sounds.   Pulmonary:      Effort: Pulmonary effort is normal.      Breath sounds: Rhonchi and rales present.   Abdominal:      General: Bowel sounds are normal.      Palpations: Abdomen is soft.   Musculoskeletal:         General: Normal range of motion.      Cervical back: Normal range of motion and neck supple.   Skin:     General: Skin is warm and dry.      Findings: Bruising, erythema and rash present.   Neurological:      General: No focal deficit present.      Mental Status: He is disoriented.   Psychiatric:      Comments: Unable to assess         Fluids    Intake/Output Summary (Last 24 hours) at 10/14/2023 1554  Last data filed at 10/14/2023 1511  Gross per 24 hour   Intake 210 ml   Output 1700 ml   Net -1490 ml         Laboratory  Recent Labs     10/12/23  0354 10/13/23  0244 10/14/23  0300   WBC 6.8 7.2 6.4   RBC 3.78* 3.85* 3.91*   HEMOGLOBIN 13.4* 13.6* 13.5*   HEMATOCRIT 38.0* 38.5* 39.3*   .5* 100.0* 100.5*   MCH 35.4* 35.3* 34.5*   MCHC 35.3 35.3 34.4   RDW 41.5 41.1 42.4   PLATELETCT 225 253 264   MPV 10.1 9.8 10.0       Recent Labs     10/12/23  0354 10/12/23  2329 10/13/23  0244 10/14/23  0300   SODIUM 141  --  141 142   POTASSIUM 2.9* 3.1* 3.1* 2.9*   CHLORIDE 99  --  100 101   CO2 30  --  28 30   GLUCOSE 118*  --  121* 118*   BUN 8  --  11 15   CREATININE 0.68  --  0.51 0.59   CALCIUM 8.3*  --  9.0 8.7                     Imaging  DX-ABDOMEN FOR TUBE PLACEMENT   Final Result       1.  Enteric tube overlies the gastric body.      DX-ABDOMEN FOR TUBE PLACEMENT   Final Result      1.  NG tube side port projects at the gastroesophageal junction. Recommend advancing before use.   2.  Bilateral pleural effusions with adjacent airspace disease.      IR-MIDLINE CATHETER INSERTION WO GUIDANCE > AGE 3   Final Result                  Ultrasound-guided midline placement performed by qualified nursing staff    as above.          DX-CHEST-PORTABLE (1 VIEW)   Final Result      RIGHT larger than LEFT pleural effusion with overlying atelectasis and/or airspace disease and suspected superimposed pulmonary edema, increased since prior      EC-ECHOCARDIOGRAM COMPLETE W/O CONT   Final Result      DX-CHEST-PORTABLE (1 VIEW)   Final Result      RIGHT larger than LEFT pleural effusion with overlying atelectasis and/or airspace disease and suspected superimposed pulmonary edema, unchanged      DX-ABDOMEN FOR TUBE PLACEMENT   Final Result         Gastric drainage tube with tip projecting over the expected area of the stomach.      DX-ABDOMEN FOR TUBE PLACEMENT   Final Result         Gastric drainage tube coiled in the stomach with tip projecting over the expected area of the distal stomach.      DX-CHEST-PORTABLE (1 VIEW)   Final Result      1.  Likely layering bilateral pleural effusions.   2.  Interstitial infiltrates and/or edema.   3.  Support apparatus as above.      DX-CHEST-PORTABLE (1 VIEW)   Final Result      1.  Pulmonary edema. Multifocal pneumonia could have a similar appearance.   2.  Likely small/moderate bilateral pleural effusions.      DX-CHEST-PORTABLE (1 VIEW)   Final Result      No acute cardiopulmonary abnormality.      CT-CSPINE WITHOUT PLUS RECONS   Final Result      No acute fracture or dislocation of the cervical spine.      CT-HEAD W/O   Final Result      1.  Cerebral atrophy.      2.  White matter lucencies most consistent with small vessel ischemic change versus demyelination or gliosis.       3.  Otherwise, Head CT without contrast with no acute findings. No evidence of acute cerebral infarction, hemorrhage or mass lesion.              Assessment/Plan  * Respiratory failure requiring intubation (HCC)- (present on admission)  Assessment & Plan  Secondary to aspiration pneumonitis, atelectasis. altered mental status, alcohol withdrawal with delirium, hypercarbic respiratory failure, benzodiazepine overdose  Telemetry monitoring   Continuous pulse oximetry   Goal saturation >92%   Head of bed > 30 degree   GI prophylaxis   Respiratory treatments: prn       Oropharyngeal dysphagia  Assessment & Plan  The patient will need to have a feeding tube placement tube feeds    Urinary tract infection- (present on admission)  Assessment & Plan  Febrile with flank pain  Urine Cx (10/8) - E. Faecalis, sensitive to ampicillin, nitrofurantoin, penicillin  Antibiotics for 5-day course    Aspiration pneumonitis (HCC)- (present on admission)  Assessment & Plan  Secondary to alcohol withdrawal syndrome  Blood cultures, sputum culture, procalcitonin  Antibiotics, secretion management  Ongoing aspiration precautions, SLP evaluation  Fiberoptic exam on 10/13 leaves the patient still in n.p.o. status    Fever- (present on admission)  Assessment & Plan  Respiratory panel negative, although patient at risk of respiratory infection, aspiration  -UA and urine culture obtained with copeland placement on 10/8/23 due to flank pain   Positive culture, recheck blood cultures, ongoing antibiotic treatment    Conjunctivitis of both eyes- (present on admission)  Assessment & Plan  Bilateral conjunctivitis of the eye, with dried discharge on the eyelids.   Trimethoprim-polymyxin four times daily for 7 days    Elevated troponin- (present on admission)  Assessment & Plan  Secondary to demand ischemia    Macrocytic anemia- (present on admission)  Assessment & Plan  Pt with hemoglobin of 12.7 and MCV at 104.5 on admission; Likely due to heavy  alcohol use.   - Continue to monitor CBC    - Continue multivitamin and thiamine supplement     Thrombocytopenia (HCC)- (present on admission)  Assessment & Plan  Slowly improving, likely alcohol related  Monitor with daily CBC    Essential tremor- (present on admission)  Assessment & Plan  Monitor    Hypokalemia- (present on admission)  Assessment & Plan  Replete and monitor closely    Alcohol withdrawal syndrome, with delirium (HCC)- (present on admission)  Assessment & Plan  Admitted on 10/6, reported drinks 1-2 bottles of wine  Transferred to ICU for Precedex drip with scheduled and as needed Ativan monitoring RASS  Aspiration and seizure precautions  Continue vitamin supplementation  Eventual alcohol cessation education and resources to be provided when appropriate    Syncope and collapse- (present on admission)  Assessment & Plan  Admitted for syncope 10/4, likely alcohol related  Echo (10/10/23): EF 55%, no valvular abnormalities  Continuous telemetry monitoring  Fall precautions  PT/OT    Neuropathy- (present on admission)  Assessment & Plan  Continue Cymbalta    Hyperlipidemia- (present on admission)  Assessment & Plan  Continue rosuvastatin    Benign prostatic hyperplasia with urinary retention- (present on admission)  Assessment & Plan  Continue doxazosin    Primary hypertension- (present on admission)  Assessment & Plan  Continue doxazosin, losartan as tolerated  Diuresis monitor      Greater than 51 minutes spent prepping to see patient (e.g. review of tests) obtaining and/or reviewing separately obtained history. Performing a medically appropriate examination and/ evaluation.  Counseling and educating the patient/family/caregiver.  Ordering medications, tests, or procedures.   Documenting clinical information in EPIC.  Independently interpreting results and communicating results to patient/family/caregiver.  Care coordination.      VTE prophylaxis: lovenox sc     I have performed a physical exam and  reviewed and updated ROS and Plan today (10/14/2023). In review of yesterday's note (10/13/2023), there are no changes except as documented above.      Please note that this dictation was created using voice recognition software. I have made every reasonable attempt to correct obvious errors, but I expect that there are errors of grammar and possibly context that I did not discover before finalizing the note.

## 2023-10-14 NOTE — THERAPY
"Speech Language Pathology   Daily Treatment     Patient Name: Balwinder Grimes  AGE:  72 y.o., SEX:  male  Medical Record #: [id number]  Date of Service: 10/14/2023      Precautions:  Precautions: Fall Risk, Swallow Precautions       Subjective  RN cleared patient for dysphagia management. Pt asleep upon arrival, easily arousable. Dysarthric speech appreciated, pt mostly unintelligible; however improved some with cues. Inconsistent command following appreciated. Pt lethargic and did not open eyes throughout session despite cues; however responding verbally throughout session.     Assessment  Pt seen this date for dysphagia management with swallow strengthening exercises. PO trials of ice chips utilized for exercise implementation. Instructed pt on effortful swallow exercise, pt stated \"yes, I can,\" however no evidence of implementation with trials. Immediate and delayed cough response with ice chips appreciated. Wet vocal quality appreciated prior, during, and post oral intake, did not improve with cued cough. Further trials deferred d/t pt's verbal interaction and participation decreasing secondary to lethargy.       Clinical Impressions  Patient presents with moderate-severe oropharyngeal dysphagia per FEES (10/13). Recommend continue NPO/NGT at this time. Service to follow for behavioral and exercise-based swallow rehabilitation as pt is appropriate. Pt would benefit from a repeat instrumental swallow study (MBSS) prior to oral diet initiation.      Recommendations  Treatment Completed: Dysphagia Treatment  Consult Referral(s): Gastroenterologist    Dysphagia Treatment  Diet Consistency: NPO, pre-feeding trials with SLP only  -Clear for minimal ice chips/hour 1:1 w/RN, when alert, after oral care to reduce xerostomia and mitigate disuse atrophy of swallow musculature  Instrumentation: Instrumental swallow study pending clinical progress  Medication: Non Oral  Oral Care: Q4h      SLP Treatment Plan  Treatment " "Plan: Dysphagia Treatment  SLP Frequency: 4x Per Week  Estimated Duration: Until Therapy Goals Met      Anticipated Discharge Needs  Discharge Recommendations: Recommend post-acute placement for additional speech therapy services prior to discharge home  Therapy Recommendations Upon DC: Dysphagia Training, Patient / Family / Caregiver Education      Patient / Family Goals  Patient / Family Goal #1: \"water\"  Goal #1 Outcome: Progressing slower than expected  Short Term Goals  Short Term Goal # 1: Patient will participate in pre-feeding trials with SLP only with no overt s/sx of aspiration  Goal Outcome # 1: Goal met, new goal added  Short Term Goal # 1 B : Patient will participate in instrumental swallow study to objectively assess swallow function and informed POC  Goal Outcome  # 1 B: Goal not met  Short Term Goal # 2: Pt will complete 20 reps each of swallow exercises targeting pharyngeal shortening, laryngeal vestibule closure and base of tongue retraction with \"good\" KATELYN Vail  "

## 2023-10-14 NOTE — PROGRESS NOTES
Report received from night shift RN, pt care assumed, tele box on. VSS, pt assessment complete. Pt AAOx2, no signs of distress noted at this time. Tele sitter in room, bilateral wrist restraints and Lt leg restraint in place. Bed in lowest position, bed alarm on, pt educated on fall risk and verbalized understanding, call light within reach, hourly rounding initiated.

## 2023-10-14 NOTE — CARE PLAN
The patient is Watcher - Medium risk of patient condition declining or worsening    Shift Goals  Clinical Goals: NG tube feed, Q2 turns, monitor labs/vitals  Patient Goals: sleep  Family Goals: n/a    Progress made toward(s) clinical / shift goals:      Problem: Knowledge Deficit - Standard  Goal: Patient and family/care givers will demonstrate understanding of plan of care, disease process/condition, diagnostic tests and medications  Outcome: Progressing     Problem: Skin Integrity  Goal: Skin integrity is maintained or improved  Outcome: Progressing     Problem: Safety - Medical Restraint  Goal: Remains free of injury from restraints (Restraint for Interference with Medical Device)  Outcome: Progressing  Goal: Free from restraint(s) (Restraint for Interference with Medical Device)  Outcome: Progressing       Patient placed in restraints at 2230 for interfering/pulling medical equipment. Patient educated on reason for placement of restraints. Q2 range of motion/turns in place.

## 2023-10-14 NOTE — DIETARY
"Nutrition Support Assessment:  Day 10 of admit.  Balwinder Grimes is a 72 y.o. male with admitting DX of syncope and collapse.     Current problem list:  Oropharyngeal dysphagia  Urinary tract infection  Respiratory failure requiring intubation  Aspiration pneumonitis  Fever  Conjunctivitis of both eyes  Thrombocytopenia  Macrocytic anemia  Elevated troponin  Syncope and collapse  Alcohol withdrawal syndrome, with delirium  Hypokalemia  Essential tremor  Primary hypertension  Benign prostatic hyperplasia with urinary retention  Hyperlipidemia  Neuropathy     Assessment:  Estimated Nutritional Needs based on:   Height: 188 cm (6' 2\")  Weight: 96.5 kg (212 lb 11.9 oz)  Weight to Use in Calculations: 96.5 kg (212 lb 11.9 oz) - most recent bed scale wt, consistent with admit stand up scale wt, pt is -10 L of fluid per I/O flow sheet  Ideal Body Weight: 86.2 kg (190 lb)  Percent Ideal Body Weight: 112  Body mass index is 27.31 kg/m²., BMI classification: overweight    Calculation/Equation: MSJ x 1.0 = 2144 kcals/day  Total Calories / day: 2144 - 2413 (Calories / k - 25)  Total Grams Protein / day: 97 - 116 (Grams Protein / k.0 - 1.2)     Evaluation:   Admitted with syncope, ground level fall.   PMH: high cholesterol, hypertension, numbness and tingling of lower left leg, Pain (low back, bilateral legs)  Pt was initially admitted to the floor. Transferred to the ICU 10/9, then to [facility] 10/11. Pt transferred back to the floor 10/13.  Pt previously intubated, assessed for tube feeding 10/10. Tube feeding ran for a very short period of time (<24 hours).  Swallow evaluation attempted by SLP 10/11, however pt was not able to participate as he was not able to stay awake.  Swallow evaluation completed 10/12, and 10/13 with FEES f/u 10/13. SLP recommended continued NPO d/t findings of moderate-severe oropharyngeal dysphagia.   Enteric tube placed and verified 10/13 (gastric).  Potassium 2.9, glucose 118  Augmentin, " Folvite, Lasix, Multivitamin, Klyte, KCl, Pericolace per MAR.  Pt may benefit from 100 mg of Thiamine daily given hx of ETOH and inadequate nutrition during admit. Voalte message sent to MD to request.   Standard tube feeding formula can meet pt's estimated nutrition needs.     Malnutrition Risk: No new criteria met per ASPEN guidelines.      Recommendations/Plan:  Continue with Fibersource HN. Advance per protocol to goal rate of 75 mL/hr. This provides 2160 kcals, 97 grams of protein and 1458 mL of free water per day.  Fluids per MD.  PO when safe/appropriate per SLP/MD and pt can adequately consume.  Monitor for refeeding: Order BMP w/ Mg and Phos x 7 days. Replete K, Phos and Mg prn. Supplement 100 mg Thiamine x 7 days to reduce risk of refeeding.    RD following.

## 2023-10-14 NOTE — PROGRESS NOTES
Received bedside report from RN, pt care assumed, VSS, pt assessment complete. Pt AAOx2, disoriented to time and situation. tele monitor on, sinus rhythm on monitor. No signs of acute distress noted at this time. Chest xray confirmed placement of NG tube, tube feeding started at 25ml/hr per order, HOB greater than 30 degrees. Bed locked/in lowest position, bed alarm on, call light within reach, hourly rounding initiated. Q2 turns in place.

## 2023-10-14 NOTE — CARE PLAN
The patient is Stable - Low risk of patient condition declining or worsening    Shift Goals  Clinical Goals: Electrolyte replacement, nutrition, mobility  Patient Goals: comfort, rest  Family Goals: communication    Progress made toward(s) clinical / shift goals:      Problem: Optimal Care for Alcohol Withdrawal  Goal: Optimal Care for the alcohol withdrawal patient  Outcome: Progressing     Problem: Safety - Medical Restraint  Goal: Remains free of injury from restraints (Restraint for Interference with Medical Device)  Outcome: Progressing       Patient is not progressing towards the following goals:

## 2023-10-15 LAB
ANION GAP SERPL CALC-SCNC: 6 MMOL/L (ref 7–16)
BUN SERPL-MCNC: 15 MG/DL (ref 8–22)
CALCIUM SERPL-MCNC: 8.7 MG/DL (ref 8.5–10.5)
CHLORIDE SERPL-SCNC: 102 MMOL/L (ref 96–112)
CO2 SERPL-SCNC: 35 MMOL/L (ref 20–33)
CREAT SERPL-MCNC: 0.58 MG/DL (ref 0.5–1.4)
ERYTHROCYTE [DISTWIDTH] IN BLOOD BY AUTOMATED COUNT: 43.6 FL (ref 35.9–50)
GFR SERPLBLD CREATININE-BSD FMLA CKD-EPI: 103 ML/MIN/1.73 M 2
GLUCOSE SERPL-MCNC: 153 MG/DL (ref 65–99)
HCT VFR BLD AUTO: 40.8 % (ref 42–52)
HGB BLD-MCNC: 14.1 G/DL (ref 14–18)
MCH RBC QN AUTO: 35.5 PG (ref 27–33)
MCHC RBC AUTO-ENTMCNC: 34.6 G/DL (ref 32.3–36.5)
MCV RBC AUTO: 102.8 FL (ref 81.4–97.8)
PLATELET # BLD AUTO: 296 K/UL (ref 164–446)
PMV BLD AUTO: 9.9 FL (ref 9–12.9)
POTASSIUM SERPL-SCNC: 4.2 MMOL/L (ref 3.6–5.5)
RBC # BLD AUTO: 3.97 M/UL (ref 4.7–6.1)
SODIUM SERPL-SCNC: 143 MMOL/L (ref 135–145)
WBC # BLD AUTO: 8.2 K/UL (ref 4.8–10.8)

## 2023-10-15 PROCEDURE — 700111 HCHG RX REV CODE 636 W/ 250 OVERRIDE (IP): Mod: JZ | Performed by: STUDENT IN AN ORGANIZED HEALTH CARE EDUCATION/TRAINING PROGRAM

## 2023-10-15 PROCEDURE — A9270 NON-COVERED ITEM OR SERVICE: HCPCS | Performed by: INTERNAL MEDICINE

## 2023-10-15 PROCEDURE — A9270 NON-COVERED ITEM OR SERVICE: HCPCS

## 2023-10-15 PROCEDURE — 700111 HCHG RX REV CODE 636 W/ 250 OVERRIDE (IP): Mod: JZ | Performed by: INTERNAL MEDICINE

## 2023-10-15 PROCEDURE — 770020 HCHG ROOM/CARE - TELE (206)

## 2023-10-15 PROCEDURE — 36415 COLL VENOUS BLD VENIPUNCTURE: CPT

## 2023-10-15 PROCEDURE — 700102 HCHG RX REV CODE 250 W/ 637 OVERRIDE(OP): Performed by: INTERNAL MEDICINE

## 2023-10-15 PROCEDURE — 85027 COMPLETE CBC AUTOMATED: CPT

## 2023-10-15 PROCEDURE — 700102 HCHG RX REV CODE 250 W/ 637 OVERRIDE(OP): Performed by: HOSPITALIST

## 2023-10-15 PROCEDURE — 31720 CLEARANCE OF AIRWAYS: CPT

## 2023-10-15 PROCEDURE — 700102 HCHG RX REV CODE 250 W/ 637 OVERRIDE(OP)

## 2023-10-15 PROCEDURE — A9270 NON-COVERED ITEM OR SERVICE: HCPCS | Performed by: HOSPITALIST

## 2023-10-15 PROCEDURE — 99232 SBSQ HOSP IP/OBS MODERATE 35: CPT | Performed by: INTERNAL MEDICINE

## 2023-10-15 PROCEDURE — 80048 BASIC METABOLIC PNL TOTAL CA: CPT

## 2023-10-15 RX ORDER — POTASSIUM CHLORIDE 7.45 MG/ML
10 INJECTION INTRAVENOUS
Status: DISCONTINUED | OUTPATIENT
Start: 2023-10-15 | End: 2023-10-15

## 2023-10-15 RX ADMIN — POTASSIUM BICARBONATE 50 MEQ: 978 TABLET, EFFERVESCENT ORAL at 05:24

## 2023-10-15 RX ADMIN — DULOXETINE HYDROCHLORIDE 30 MG: 30 CAPSULE, DELAYED RELEASE ORAL at 05:26

## 2023-10-15 RX ADMIN — VALPROIC ACID 250 MG: 250 SOLUTION ORAL at 22:26

## 2023-10-15 RX ADMIN — FUROSEMIDE 20 MG: 10 INJECTION, SOLUTION INTRAVENOUS at 05:24

## 2023-10-15 RX ADMIN — AMOXICILLIN AND CLAVULANATE POTASSIUM 1 TABLET: 875; 125 TABLET, FILM COATED ORAL at 17:45

## 2023-10-15 RX ADMIN — VALPROIC ACID 250 MG: 250 SOLUTION ORAL at 05:26

## 2023-10-15 RX ADMIN — ENOXAPARIN SODIUM 40 MG: 100 INJECTION SUBCUTANEOUS at 17:41

## 2023-10-15 RX ADMIN — POTASSIUM BICARBONATE 50 MEQ: 978 TABLET, EFFERVESCENT ORAL at 17:41

## 2023-10-15 RX ADMIN — VALPROIC ACID 250 MG: 250 SOLUTION ORAL at 17:41

## 2023-10-15 RX ADMIN — LOSARTAN POTASSIUM 50 MG: 50 TABLET, FILM COATED ORAL at 05:26

## 2023-10-15 RX ADMIN — ROSUVASTATIN CALCIUM 20 MG: 20 TABLET, FILM COATED ORAL at 17:41

## 2023-10-15 RX ADMIN — Medication 100 MG: at 05:26

## 2023-10-15 RX ADMIN — CLOBETASOL PROPIONATE CREAM USP, 0.05%: 0.5 CREAM TOPICAL at 17:41

## 2023-10-15 RX ADMIN — FOLIC ACID 1 MG: 1 TABLET ORAL at 05:26

## 2023-10-15 RX ADMIN — FUROSEMIDE 20 MG: 10 INJECTION, SOLUTION INTRAVENOUS at 17:41

## 2023-10-15 RX ADMIN — CLOBETASOL PROPIONATE CREAM USP, 0.05%: 0.5 CREAM TOPICAL at 05:42

## 2023-10-15 RX ADMIN — AMOXICILLIN AND CLAVULANATE POTASSIUM 1 TABLET: 875; 125 TABLET, FILM COATED ORAL at 05:26

## 2023-10-15 RX ADMIN — THERA TABS 1 TABLET: TAB at 05:26

## 2023-10-15 ASSESSMENT — FIBROSIS 4 INDEX: FIB4 SCORE: 1.9

## 2023-10-15 ASSESSMENT — ENCOUNTER SYMPTOMS
MUSCULOSKELETAL NEGATIVE: 1
GASTROINTESTINAL NEGATIVE: 1
NEUROLOGICAL NEGATIVE: 1
COUGH: 1
EYES NEGATIVE: 1
SHORTNESS OF BREATH: 1
NERVOUS/ANXIOUS: 1
CARDIOVASCULAR NEGATIVE: 1

## 2023-10-15 ASSESSMENT — LIFESTYLE VARIABLES: SUBSTANCE_ABUSE: 1

## 2023-10-15 NOTE — PROGRESS NOTES
Hospital Medicine Daily Progress Note    Date of Service  10/15/2023    Chief Complaint  Baliwnder Grimes is a 72 y.o. male admitted 10/4/2023 with fall, alcohol abuse    Hospital Course  72 y.o. male who presented 10/4/2023 with a past med history significant for hypertension, dyslipidemia, alcohol abuse, admitted to the hospital in October for after syncopal event resulting in forehead trauma, the patient had complained of increasing dizziness for the last 2 weeks, had accidentally taken double dose of doxazosin reportedly, reported a recent discharge from alcohol rehab and told that he had been sober for 10 days, he had an elevated alcohol level on admission, he was admitted to the [facility] family medicine service for syncope work-up and alcohol withdrawal, reportedly the patient was aggressively treated for tremors and suppose it CIWA scores with elevation, received 20 mg of Ativan, subsequently found obtunded and hypoxic requiring escalation to the ICU level of care, had a large amount of oral secretions, suctioned, chest x-ray positive for bilateral lower lobe infiltrates, was given flumazenil with some level of improvement, moved to the ICU level of care for the patient was intubated, successfully extubated on 10/10/2023, the patient remains significantly confused, appears sedate, wet breath sounds, chest x-ray with right greater than left effusion, infiltrates, currently not a historian  Per the patient's sister the patient has been recently deteriorating in terms of his alcohol intake reportedly.    Interval Problem Update  Patient seen and examined today.  Data, Medication data reviewed.  Case discussed with nursing as available.  Plan of Care reviewed with patient and notified of changes.   10/12 patient reportedly awake all night, constantly attempting to remove catheters, lines, agitated, medicated with Haldol, tachycardic during his agitation episodes, on my examination the patient finally fell  asleep,  Currently afebrile, heart rate in the 90s, respiration unlabored, rhonchorous, saturating 92% on 2 L nasal cannula oxygen,  With a count of 6.8, hemoglobin 13.4, hematocrit 38, platelet count 225,  Sodium 141, potassium 2.9, chloride 99, bicarb 30, glucose 118, BUN is 8, creatinine 0.68,  BNP is 2401  10/13 the patient appears improved today, drowsy and somewhat lethargic but responds adequately, afebrile, heart rate in the 70s to 80s, respiration unlabored, currently on 2 L nasal cannula oxygen saturating in the low to mid 90s, blood pressure in the 1 teens to 160s over 80s  The patient was reevaluated by FEES and unfortunately failed  10/14: Patient seen and examined, confused, on restrains, now on tube feeds as he has failed FEES.   Also now having urinary retention so will place copeland still on 2-3 L of oxygen will vinny as tolerated.  Transferred from ICU overnight   10/15: Patient resting in bed, afebrile, still confused and on restrains, on tube feeds, on 5L of oxygen wean as tolerated  Hypokalemia replete     I have discussed this patient's plan of care and discharge plan at IDT rounds today with Case Management, Nursing, Nursing leadership, and other members of the IDT team.    Consultants/Specialty  critical care    Code Status  Full Code    Disposition  The patient is not medically cleared for discharge to home or a post-acute facility.      I have placed the appropriate orders for post-discharge needs.    Review of Systems  Review of Systems   Unable to perform ROS: Mental acuity   Constitutional:  Positive for malaise/fatigue.   HENT: Negative.     Eyes: Negative.    Respiratory:  Positive for cough and shortness of breath.    Cardiovascular: Negative.    Gastrointestinal: Negative.    Genitourinary: Negative.    Musculoskeletal: Negative.    Skin: Negative.    Neurological: Negative.    Endo/Heme/Allergies: Negative.    Psychiatric/Behavioral:  Positive for substance abuse. The patient is  nervous/anxious.    All other systems reviewed and are negative.       Physical Exam  Temp:  [36.7 °C (98.1 °F)-37 °C (98.6 °F)] 37 °C (98.6 °F)  Pulse:  [67-81] 74  Resp:  [17-20] 18  BP: (118-125)/(61-68) 122/64  SpO2:  [93 %-98 %] 95 %    Physical Exam  Vitals and nursing note reviewed.   Constitutional:       Appearance: He is well-developed.      Comments: Pt seen and examined.  Lethargic, easily arousable   HENT:      Head: Normocephalic and atraumatic.      Comments: Facial rash  Eyes:      Pupils: Pupils are equal, round, and reactive to light.   Cardiovascular:      Rate and Rhythm: Normal rate and regular rhythm.      Heart sounds: Normal heart sounds.   Pulmonary:      Effort: Pulmonary effort is normal.      Breath sounds: Rhonchi and rales present.   Abdominal:      General: Bowel sounds are normal.      Palpations: Abdomen is soft.   Musculoskeletal:         General: Normal range of motion.      Cervical back: Normal range of motion and neck supple.   Skin:     General: Skin is warm and dry.      Findings: Bruising, erythema and rash present.   Neurological:      General: No focal deficit present.      Mental Status: He is disoriented.   Psychiatric:      Comments: Unable to assess         Fluids    Intake/Output Summary (Last 24 hours) at 10/15/2023 1523  Last data filed at 10/15/2023 1400  Gross per 24 hour   Intake 2120 ml   Output 1325 ml   Net 795 ml         Laboratory  Recent Labs     10/13/23  0244 10/14/23  0300 10/15/23  1159   WBC 7.2 6.4 8.2   RBC 3.85* 3.91* 3.97*   HEMOGLOBIN 13.6* 13.5* 14.1   HEMATOCRIT 38.5* 39.3* 40.8*   .0* 100.5* 102.8*   MCH 35.3* 34.5* 35.5*   MCHC 35.3 34.4 34.6   RDW 41.1 42.4 43.6   PLATELETCT 253 264 296   MPV 9.8 10.0 9.9       Recent Labs     10/13/23  0244 10/14/23  0300 10/15/23  1159   SODIUM 141 142 143   POTASSIUM 3.1* 2.9* 4.2   CHLORIDE 100 101 102   CO2 28 30 35*   GLUCOSE 121* 118* 153*   BUN 11 15 15   CREATININE 0.51 0.59 0.58   CALCIUM 9.0  8.7 8.7                     Imaging  DX-ABDOMEN FOR TUBE PLACEMENT   Final Result      1.  Enteric tube overlies the gastric body.      DX-ABDOMEN FOR TUBE PLACEMENT   Final Result      1.  NG tube side port projects at the gastroesophageal junction. Recommend advancing before use.   2.  Bilateral pleural effusions with adjacent airspace disease.      IR-MIDLINE CATHETER INSERTION WO GUIDANCE > AGE 3   Final Result                  Ultrasound-guided midline placement performed by qualified nursing staff    as above.          DX-CHEST-PORTABLE (1 VIEW)   Final Result      RIGHT larger than LEFT pleural effusion with overlying atelectasis and/or airspace disease and suspected superimposed pulmonary edema, increased since prior      EC-ECHOCARDIOGRAM COMPLETE W/O CONT   Final Result      DX-CHEST-PORTABLE (1 VIEW)   Final Result      RIGHT larger than LEFT pleural effusion with overlying atelectasis and/or airspace disease and suspected superimposed pulmonary edema, unchanged      DX-ABDOMEN FOR TUBE PLACEMENT   Final Result         Gastric drainage tube with tip projecting over the expected area of the stomach.      DX-ABDOMEN FOR TUBE PLACEMENT   Final Result         Gastric drainage tube coiled in the stomach with tip projecting over the expected area of the distal stomach.      DX-CHEST-PORTABLE (1 VIEW)   Final Result      1.  Likely layering bilateral pleural effusions.   2.  Interstitial infiltrates and/or edema.   3.  Support apparatus as above.      DX-CHEST-PORTABLE (1 VIEW)   Final Result      1.  Pulmonary edema. Multifocal pneumonia could have a similar appearance.   2.  Likely small/moderate bilateral pleural effusions.      DX-CHEST-PORTABLE (1 VIEW)   Final Result      No acute cardiopulmonary abnormality.      CT-CSPINE WITHOUT PLUS RECONS   Final Result      No acute fracture or dislocation of the cervical spine.      CT-HEAD W/O   Final Result      1.  Cerebral atrophy.      2.  White matter  lucencies most consistent with small vessel ischemic change versus demyelination or gliosis.      3.  Otherwise, Head CT without contrast with no acute findings. No evidence of acute cerebral infarction, hemorrhage or mass lesion.              Assessment/Plan  * Respiratory failure requiring intubation (HCC)- (present on admission)  Assessment & Plan  Secondary to aspiration pneumonitis, atelectasis. altered mental status, alcohol withdrawal with delirium, hypercarbic respiratory failure, benzodiazepine overdose  Telemetry monitoring   Continuous pulse oximetry   Goal saturation >92%   Head of bed > 30 degree   GI prophylaxis   Respiratory treatments: prn       Oropharyngeal dysphagia  Assessment & Plan  The patient will need to have a feeding tube placement tube feeds    Urinary tract infection- (present on admission)  Assessment & Plan  Febrile with flank pain  Urine Cx (10/8) - E. Faecalis, sensitive to ampicillin, nitrofurantoin, penicillin  Antibiotics for 5-day course    Aspiration pneumonitis (HCC)- (present on admission)  Assessment & Plan  Secondary to alcohol withdrawal syndrome  Blood cultures, sputum culture, procalcitonin  Antibiotics, secretion management  Ongoing aspiration precautions, SLP evaluation  Fiberoptic exam on 10/13 leaves the patient still in n.p.o. status    Fever- (present on admission)  Assessment & Plan  Respiratory panel negative, although patient at risk of respiratory infection, aspiration  -UA and urine culture obtained with copeland placement on 10/8/23 due to flank pain   Positive culture, recheck blood cultures, ongoing antibiotic treatment    Conjunctivitis of both eyes- (present on admission)  Assessment & Plan  Bilateral conjunctivitis of the eye, with dried discharge on the eyelids.   Trimethoprim-polymyxin four times daily for 7 days    Elevated troponin- (present on admission)  Assessment & Plan  Secondary to demand ischemia    Macrocytic anemia- (present on  admission)  Assessment & Plan  Pt with hemoglobin of 12.7 and MCV at 104.5 on admission; Likely due to heavy alcohol use.   - Continue to monitor CBC    - Continue multivitamin and thiamine supplement     Thrombocytopenia (HCC)- (present on admission)  Assessment & Plan  Slowly improving, likely alcohol related  Monitor with daily CBC    Essential tremor- (present on admission)  Assessment & Plan  Monitor    Hypokalemia- (present on admission)  Assessment & Plan  Replete and monitor closely    Alcohol withdrawal syndrome, with delirium (HCC)- (present on admission)  Assessment & Plan  Admitted on 10/6, reported drinks 1-2 bottles of wine  Transferred to ICU for Precedex drip with scheduled and as needed Ativan monitoring RASS  Aspiration and seizure precautions  Continue vitamin supplementation  Eventual alcohol cessation education and resources to be provided when appropriate    Syncope and collapse- (present on admission)  Assessment & Plan  Admitted for syncope 10/4, likely alcohol related  Echo (10/10/23): EF 55%, no valvular abnormalities  Continuous telemetry monitoring  Fall precautions  PT/OT    Neuropathy- (present on admission)  Assessment & Plan  Continue Cymbalta    Hyperlipidemia- (present on admission)  Assessment & Plan  Continue rosuvastatin    Benign prostatic hyperplasia with urinary retention- (present on admission)  Assessment & Plan  Continue doxazosin    Primary hypertension- (present on admission)  Assessment & Plan  Continue doxazosin, losartan as tolerated  Diuresis monitor        VTE prophylaxis: lovenox sc     I have performed a physical exam and reviewed and updated ROS and Plan today (10/15/2023). In review of yesterday's note (10/14/2023), there are no changes except as documented above.      Please note that this dictation was created using voice recognition software. I have made every reasonable attempt to correct obvious errors, but I expect that there are errors of grammar and  possibly context that I did not discover before finalizing the note.

## 2023-10-15 NOTE — CARE PLAN
The patient is Watcher - Medium risk of patient condition declining or worsening    Shift Goals  Clinical Goals: Monitor Vitals / Safety  Patient Goals: Comfort  Family Goals: MAXX    Progress made toward(s) clinical / shift goals:        Problem: Optimal Care for Alcohol Withdrawal  Goal: Optimal Care for the alcohol withdrawal patient  Outcome: Progressing     Problem: Seizure Precautions  Goal: Implementation of seizure precautions  Outcome: Progressing     Problem: Risk for Aspiration  Goal: Patient's risk for aspiration will be absent or decrease  Outcome: Progressing     Problem: Fall Risk  Goal: Patient will remain free from falls  Outcome: Progressing     Problem: Safety - Medical Restraint  Goal: Remains free of injury from restraints (Restraint for Interference with Medical Device)  Outcome: Progressing     Problem: Pain - Standard  Goal: Alleviation of pain or a reduction in pain to the patient’s comfort goal  Outcome: Progressing       Patient is not progressing towards the following goals:

## 2023-10-15 NOTE — PROGRESS NOTES
Received bedside report for day shift RN, and I assumed care of the patient.  There is a tele sitter present in the room and the patient is on 2 pt restraints for his safety.  RT was present for suctioning.  He denied any pain and verbalized he had no questions for me.  Bed is in lowest position with call light and personal belongings within reach.  Bed alarm has been activated as an added precaution.  One hour rounding has been initiated.

## 2023-10-15 NOTE — CARE PLAN
The patient is Stable - Low risk of patient condition declining or worsening    Shift Goals  Clinical Goals: Monitor Vitals / Safety  Patient Goals: Comfort  Family Goals: MAXX    Progress made toward(s) clinical / shift goals:  yes    Patient is not progressing towards the following goals:      Problem: Optimal Care for Alcohol Withdrawal  Goal: Optimal Care for the alcohol withdrawal patient  Outcome: Progressing     Problem: Seizure Precautions  Goal: Implementation of seizure precautions  Outcome: Progressing     Problem: Lifestyle Changes  Goal: Patient's ability to identify lifestyle changes and available resources to help reduce recurrence of condition will improve  Outcome: Progressing     Problem: Psychosocial  Goal: Patient's level of anxiety will decrease  Outcome: Progressing  Goal: Spiritual and cultural needs incorporated into hospitalization  Outcome: Progressing     Problem: Risk for Aspiration  Goal: Patient's risk for aspiration will be absent or decrease  Outcome: Progressing     Problem: Fall Risk  Goal: Patient will remain free from falls  Outcome: Progressing     Problem: Knowledge Deficit - Standard  Goal: Patient and family/care givers will demonstrate understanding of plan of care, disease process/condition, diagnostic tests and medications  Outcome: Progressing     Problem: Skin Integrity  Goal: Skin integrity is maintained or improved  Outcome: Progressing     Problem: Safety - Medical Restraint  Goal: Remains free of injury from restraints (Restraint for Interference with Medical Device)  Outcome: Progressing  Goal: Free from restraint(s) (Restraint for Interference with Medical Device)  Outcome: Progressing     Problem: Pain - Standard  Goal: Alleviation of pain or a reduction in pain to the patient’s comfort goal  Outcome: Progressing

## 2023-10-16 ENCOUNTER — APPOINTMENT (OUTPATIENT)
Dept: RADIOLOGY | Facility: MEDICAL CENTER | Age: 73
DRG: 896 | End: 2023-10-16
Attending: INTERNAL MEDICINE
Payer: MEDICARE

## 2023-10-16 LAB
ALBUMIN SERPL BCP-MCNC: 3.1 G/DL (ref 3.2–4.9)
ALBUMIN/GLOB SERPL: 1 G/DL
ALP SERPL-CCNC: 90 U/L (ref 30–99)
ALT SERPL-CCNC: 20 U/L (ref 2–50)
ANION GAP SERPL CALC-SCNC: 5 MMOL/L (ref 7–16)
AST SERPL-CCNC: 23 U/L (ref 12–45)
BILIRUB SERPL-MCNC: 0.5 MG/DL (ref 0.1–1.5)
BUN SERPL-MCNC: 15 MG/DL (ref 8–22)
CALCIUM ALBUM COR SERPL-MCNC: 9.5 MG/DL (ref 8.5–10.5)
CALCIUM SERPL-MCNC: 8.8 MG/DL (ref 8.5–10.5)
CHLORIDE SERPL-SCNC: 103 MMOL/L (ref 96–112)
CO2 SERPL-SCNC: 36 MMOL/L (ref 20–33)
CREAT SERPL-MCNC: 0.65 MG/DL (ref 0.5–1.4)
CRP SERPL HS-MCNC: 5.49 MG/DL (ref 0–0.75)
ERYTHROCYTE [DISTWIDTH] IN BLOOD BY AUTOMATED COUNT: 44 FL (ref 35.9–50)
GFR SERPLBLD CREATININE-BSD FMLA CKD-EPI: 100 ML/MIN/1.73 M 2
GLOBULIN SER CALC-MCNC: 3.2 G/DL (ref 1.9–3.5)
GLUCOSE SERPL-MCNC: 139 MG/DL (ref 65–99)
HCT VFR BLD AUTO: 39.7 % (ref 42–52)
HGB BLD-MCNC: 13.2 G/DL (ref 14–18)
MCH RBC QN AUTO: 34.3 PG (ref 27–33)
MCHC RBC AUTO-ENTMCNC: 33.2 G/DL (ref 32.3–36.5)
MCV RBC AUTO: 103.1 FL (ref 81.4–97.8)
PLATELET # BLD AUTO: 309 K/UL (ref 164–446)
PMV BLD AUTO: 9.9 FL (ref 9–12.9)
POTASSIUM SERPL-SCNC: 4.2 MMOL/L (ref 3.6–5.5)
PREALB SERPL-MCNC: 12.7 MG/DL (ref 18–38)
PROT SERPL-MCNC: 6.3 G/DL (ref 6–8.2)
RBC # BLD AUTO: 3.85 M/UL (ref 4.7–6.1)
SODIUM SERPL-SCNC: 144 MMOL/L (ref 135–145)
WBC # BLD AUTO: 8.7 K/UL (ref 4.8–10.8)

## 2023-10-16 PROCEDURE — 700102 HCHG RX REV CODE 250 W/ 637 OVERRIDE(OP): Performed by: HOSPITALIST

## 2023-10-16 PROCEDURE — 700102 HCHG RX REV CODE 250 W/ 637 OVERRIDE(OP): Performed by: INTERNAL MEDICINE

## 2023-10-16 PROCEDURE — 700111 HCHG RX REV CODE 636 W/ 250 OVERRIDE (IP): Mod: JZ | Performed by: STUDENT IN AN ORGANIZED HEALTH CARE EDUCATION/TRAINING PROGRAM

## 2023-10-16 PROCEDURE — 99232 SBSQ HOSP IP/OBS MODERATE 35: CPT | Performed by: INTERNAL MEDICINE

## 2023-10-16 PROCEDURE — 86140 C-REACTIVE PROTEIN: CPT

## 2023-10-16 PROCEDURE — 700102 HCHG RX REV CODE 250 W/ 637 OVERRIDE(OP)

## 2023-10-16 PROCEDURE — 84134 ASSAY OF PREALBUMIN: CPT

## 2023-10-16 PROCEDURE — A9270 NON-COVERED ITEM OR SERVICE: HCPCS | Performed by: INTERNAL MEDICINE

## 2023-10-16 PROCEDURE — 74230 X-RAY XM SWLNG FUNCJ C+: CPT

## 2023-10-16 PROCEDURE — 92611 MOTION FLUOROSCOPY/SWALLOW: CPT

## 2023-10-16 PROCEDURE — 85027 COMPLETE CBC AUTOMATED: CPT

## 2023-10-16 PROCEDURE — 36415 COLL VENOUS BLD VENIPUNCTURE: CPT

## 2023-10-16 PROCEDURE — 92526 ORAL FUNCTION THERAPY: CPT

## 2023-10-16 PROCEDURE — 80053 COMPREHEN METABOLIC PANEL: CPT

## 2023-10-16 PROCEDURE — 770020 HCHG ROOM/CARE - TELE (206)

## 2023-10-16 PROCEDURE — 700111 HCHG RX REV CODE 636 W/ 250 OVERRIDE (IP): Mod: JZ | Performed by: INTERNAL MEDICINE

## 2023-10-16 PROCEDURE — A9270 NON-COVERED ITEM OR SERVICE: HCPCS

## 2023-10-16 PROCEDURE — A9270 NON-COVERED ITEM OR SERVICE: HCPCS | Performed by: HOSPITALIST

## 2023-10-16 RX ORDER — ONDANSETRON 4 MG/1
4 TABLET, ORALLY DISINTEGRATING ORAL EVERY 4 HOURS PRN
Status: DISCONTINUED | OUTPATIENT
Start: 2023-10-16 | End: 2023-10-20 | Stop reason: HOSPADM

## 2023-10-16 RX ORDER — LORAZEPAM 0.5 MG/1
0.5 TABLET ORAL EVERY 4 HOURS PRN
Status: DISCONTINUED | OUTPATIENT
Start: 2023-10-16 | End: 2023-10-20 | Stop reason: HOSPADM

## 2023-10-16 RX ORDER — ROSUVASTATIN CALCIUM 20 MG/1
20 TABLET, COATED ORAL EVERY EVENING
Status: DISCONTINUED | OUTPATIENT
Start: 2023-10-17 | End: 2023-10-20 | Stop reason: HOSPADM

## 2023-10-16 RX ORDER — ACETAMINOPHEN 325 MG/1
650 TABLET ORAL EVERY 6 HOURS PRN
Status: DISCONTINUED | OUTPATIENT
Start: 2023-10-16 | End: 2023-10-20 | Stop reason: HOSPADM

## 2023-10-16 RX ORDER — GAUZE BANDAGE 2" X 2"
100 BANDAGE TOPICAL DAILY
Status: DISCONTINUED | OUTPATIENT
Start: 2023-10-17 | End: 2023-10-20 | Stop reason: HOSPADM

## 2023-10-16 RX ORDER — BISACODYL 10 MG
10 SUPPOSITORY, RECTAL RECTAL
Status: DISCONTINUED | OUTPATIENT
Start: 2023-10-16 | End: 2023-10-20 | Stop reason: HOSPADM

## 2023-10-16 RX ORDER — POLYETHYLENE GLYCOL 3350 17 G/17G
1 POWDER, FOR SOLUTION ORAL
Status: DISCONTINUED | OUTPATIENT
Start: 2023-10-16 | End: 2023-10-20 | Stop reason: HOSPADM

## 2023-10-16 RX ORDER — VALPROIC ACID 250 MG/1
250 CAPSULE, LIQUID FILLED ORAL EVERY 8 HOURS
Status: DISCONTINUED | OUTPATIENT
Start: 2023-10-17 | End: 2023-10-20 | Stop reason: HOSPADM

## 2023-10-16 RX ORDER — DULOXETIN HYDROCHLORIDE 30 MG/1
30 CAPSULE, DELAYED RELEASE ORAL DAILY
Status: DISCONTINUED | OUTPATIENT
Start: 2023-10-17 | End: 2023-10-20 | Stop reason: HOSPADM

## 2023-10-16 RX ORDER — FOLIC ACID 1 MG/1
1 TABLET ORAL DAILY
Status: DISCONTINUED | OUTPATIENT
Start: 2023-10-17 | End: 2023-10-20 | Stop reason: HOSPADM

## 2023-10-16 RX ORDER — AMOXICILLIN 250 MG
2 CAPSULE ORAL 2 TIMES DAILY
Status: DISCONTINUED | OUTPATIENT
Start: 2023-10-17 | End: 2023-10-20 | Stop reason: HOSPADM

## 2023-10-16 RX ORDER — LOSARTAN POTASSIUM 50 MG/1
50 TABLET ORAL DAILY
Status: DISCONTINUED | OUTPATIENT
Start: 2023-10-17 | End: 2023-10-20 | Stop reason: HOSPADM

## 2023-10-16 RX ORDER — QUETIAPINE FUMARATE 25 MG/1
25 TABLET, FILM COATED ORAL NIGHTLY PRN
Status: DISCONTINUED | OUTPATIENT
Start: 2023-10-16 | End: 2023-10-20 | Stop reason: HOSPADM

## 2023-10-16 RX ADMIN — NYSTATIN 500000 UNITS: 100000 SUSPENSION ORAL at 13:23

## 2023-10-16 RX ADMIN — VALPROIC ACID 250 MG: 250 SOLUTION ORAL at 05:43

## 2023-10-16 RX ADMIN — FUROSEMIDE 20 MG: 10 INJECTION, SOLUTION INTRAVENOUS at 05:43

## 2023-10-16 RX ADMIN — FOLIC ACID 1 MG: 1 TABLET ORAL at 05:43

## 2023-10-16 RX ADMIN — ROSUVASTATIN CALCIUM 20 MG: 20 TABLET, FILM COATED ORAL at 18:20

## 2023-10-16 RX ADMIN — QUETIAPINE FUMARATE 25 MG: 25 TABLET ORAL at 20:59

## 2023-10-16 RX ADMIN — AMOXICILLIN AND CLAVULANATE POTASSIUM 1 TABLET: 875; 125 TABLET, FILM COATED ORAL at 05:42

## 2023-10-16 RX ADMIN — Medication 100 MG: at 05:43

## 2023-10-16 RX ADMIN — NYSTATIN 500000 UNITS: 100000 SUSPENSION ORAL at 18:19

## 2023-10-16 RX ADMIN — VALPROIC ACID 250 MG: 250 SOLUTION ORAL at 21:00

## 2023-10-16 RX ADMIN — ENOXAPARIN SODIUM 40 MG: 100 INJECTION SUBCUTANEOUS at 18:19

## 2023-10-16 RX ADMIN — LORAZEPAM 0.5 MG: 0.5 TABLET ORAL at 21:00

## 2023-10-16 RX ADMIN — CLOBETASOL PROPIONATE CREAM USP, 0.05%: 0.5 CREAM TOPICAL at 05:52

## 2023-10-16 RX ADMIN — POTASSIUM BICARBONATE 50 MEQ: 978 TABLET, EFFERVESCENT ORAL at 18:20

## 2023-10-16 RX ADMIN — DULOXETINE HYDROCHLORIDE 30 MG: 30 CAPSULE, DELAYED RELEASE ORAL at 05:42

## 2023-10-16 RX ADMIN — FUROSEMIDE 20 MG: 10 INJECTION, SOLUTION INTRAVENOUS at 15:20

## 2023-10-16 RX ADMIN — NYSTATIN 500000 UNITS: 100000 SUSPENSION ORAL at 08:59

## 2023-10-16 RX ADMIN — POTASSIUM BICARBONATE 50 MEQ: 978 TABLET, EFFERVESCENT ORAL at 05:42

## 2023-10-16 RX ADMIN — THERA TABS 1 TABLET: TAB at 05:43

## 2023-10-16 RX ADMIN — CLOBETASOL PROPIONATE CREAM USP, 0.05%: 0.5 CREAM TOPICAL at 18:20

## 2023-10-16 RX ADMIN — NYSTATIN 500000 UNITS: 100000 SUSPENSION ORAL at 21:00

## 2023-10-16 RX ADMIN — LOSARTAN POTASSIUM 50 MG: 50 TABLET, FILM COATED ORAL at 05:42

## 2023-10-16 RX ADMIN — VALPROIC ACID 250 MG: 250 SOLUTION ORAL at 15:20

## 2023-10-16 ASSESSMENT — FIBROSIS 4 INDEX: FIB4 SCORE: 1.69

## 2023-10-16 ASSESSMENT — ENCOUNTER SYMPTOMS
CARDIOVASCULAR NEGATIVE: 1
SHORTNESS OF BREATH: 1
NERVOUS/ANXIOUS: 1
MUSCULOSKELETAL NEGATIVE: 1
EYES NEGATIVE: 1
GASTROINTESTINAL NEGATIVE: 1
NEUROLOGICAL NEGATIVE: 1
COUGH: 1

## 2023-10-16 ASSESSMENT — LIFESTYLE VARIABLES: SUBSTANCE_ABUSE: 1

## 2023-10-16 NOTE — THERAPY
Speech Language Pathology   Videofluoroscopic Swallow Study Evaluation     Patient Name: Balwinder Grimes  AGE:  72 y.o., SEX:  male  Medical Record #: [id number]  Date of Service: 10/16/2023      History of Present Illness  Pt is a 72 y.o. M w/ h/o HTN, BPH, HLD, ETOH abuse, essential tremor, admitted following syncopal episode with fall and head strike. CT negative. Pt dx with anemia, alcohol withdrawal. Intubated 10/9/23 - 10/10/23.      Pertinent Information  Current Method of Nutrition: NPO until cleared by speech pathology; NGT removed earlier this date  Patient Behaviors: Confused  Dentition: Fair  Secretion Management: Adequate  Feeding Tube: None  Tracheostomy: No   Factor(s) Affecting Performance: Impaired mental status, Impaired command following      Discussed the risks, benefits, and alternatives of the VFSS procedure. Patient/family acknowledged and agreed to proceed.      Assessment  Videofluoroscopic Swallow Study was conducted in the Lateral projection(s) to evaluate oropharyngeal swallow function. A radiology tech was present to assist with the procedure.      Positioning: Seated upright in fluoroscopy chair  Anatomic View: Kyphosis  Bolus Administration: SLP  PO Barium Contrast Trials: Varibar thin, Varibar nectar (mildly thick), Liquidised mixed with Varibar powder, Varibar pudding, Soft & Bite sized coated with Varibar powder, Regular solid coated with Varibar pudding, Mixed consistency with Varibar powder      Consistency PAS Score Timing Residue Comments   Thin Liquid 2 During swallow Vallecular Residue: Moderate (25%-50%)  Pyriform Sinus Residue: Moderate (25%-50%) Tsp - PAS 1  Cup - PAS 1  Straw - PAS 1  Rapid, sequential straw - PAS 1 x2, PAS 2   Mildly Thick 1 N/A Vallecular Residue: Moderate (25%-50%)  Pyriform Sinus Residue: Trace (1%-5%)    Liquidised 1 N/A Vallecular Residue: Moderate (25%-50%)  Pyriform Sinus Residue: Mild (5%-25%)    Pudding 1 N/A Vallecular Residue: Severe  (>50%)  Pyriform Sinus Residue: Trace (1%-5%)    Soft & Bite Sized 1 N/A Vallecular Residue: Moderate (25%-50%)  Pyriform Sinus Residue: Trace (1%-5%)    Regular Solid 3 During Swallow Vallecular Residue: Severe (>50%)  Pyriform Sinus Residue: Trace (1%-5%)    Mixed 1 N/A Vallecular Residue: Moderate (25%-50%)  Pyriform Sinus Residue: Mild (5%-25%)      Penetration-Aspiration Scale (PAS)  1     No contrast enters airway  2     Contrast enters the airway, remains above the vocal folds, and is ejected from the airway (not seen in the airway at the end of the swallow).  3     Contrast enters the airway, remains above the vocal folds, and is not ejected from the airway (is seen in the airway after the swallow).  4     Contrast enters the airway, contacts the vocal folds, and is ejected from the airway.  5     Contrast enters the airway, contacts the vocal folds, and is not ejected from the airway  6     Contrast enters the airway, crosses the plane of the vocal folds, and is ejected from the airway.  7     Contrast enters the airway, crosses the plane of the vocal folds, and is not ejected from the airway despite effort.  8     Contrast enters the airway, crosses the plane of the vocal folds, is not ejected from the airway and there is no response to aspiration.       Oral phase:  Prolonged oral bolus hold (6+ seconds) in single instance with thin liquids via cup. Did not repeat pattern throughout evaluation. Timely mastication of solid textures. Appropriate containment and AP transfer.       Pharyngeal phase:  Pharyngeal phase characterized by impairments in pharyngeal shortening, laryngeal vestibule closure and base of tongue retraction which resulted in the following:  - Shallow penetration with RG7 and in single instance with TN0 during the swallow. Penetrate cleared from the airway in all but trace amounts from the airway.  - Moderate to severe vallecular residue with all consistencies trialed, greatest with PU4  and RG7 and increased with rapid intake, that did not clear spontaneously  - Mild pyriform sinus residue with LQ3 and mixed and moderate pyriform sinus residue only with rapid intake of thins that did not clear spontaneously  -  Trace to mild PPW residue throughout    Esophageal Phase:  AP esophageal screen was not completed given patient kyphosis and limited mobility. No retention or retrograde flow noted in limited view of upper esophagus.       Compensatory Strategies:  Multiple swallows - SOMEWHAT EFFECTIVE to reduce residue  Liquid wash - INEFFECTIVE to reduce reside  Chin tuck - INEFFECTIVE to reduce reside  Effortful swallow - INEFFECTIVE to reduce reside    Severity Rating:   RUSS: Moderate-Severe      Clinical Impressions  The pt presents with a moderate-severe oropharyngeal dysphagia, likely acute related to AMS and prolonged NPO status following respiratory failure and intubation. Swallow safety is broadly preserved; pharyngeal efficiency is impaired. Would recommend RG/TN diet at this time with use of 1:1 spv and multiple swallows. Pt appears to be a good candidate for ongoing behavorial rehabilitation and a fair candidate for ongoing exercise-based rehabilitation. Dysphagia outcomes can be maximized with use of mobility as pt is able and frequent, thorough oral care.         Recommendations  Regular solids with thin liquids  Medication: Crush with applesauce, as appropriate, Crush with pudding/puree, as appropriate  Supervision: 1:1 feeding with constant supervision, Encourage self-feeding  Positioning: Fully upright and midline during oral intake  Strategies: Small bites/sips, Slow rate of intake, Multiple swallows (2-3) per bite/sips  Oral Care: BID  Additional Instrumentation: None  Consult Referral(s): Gastroenterologist      SLP Treatment Plan  Treatment Plan: Dysphagia Treatment, Patient/Family/Caregiver Training  SLP Frequency: 3x Per Week  Estimated Duration: Until Therapy Goals  "Met      Anticipated Discharge Needs  Discharge Recommendations: Recommend home health for continued speech therapy services   Therapy Recommendations Upon DC: Dysphagia Training, Community Re-Integration, Patient / Family / Caregiver Education        Patient / Family Goals  Patient / Family Goal #1: \"water\"  Goal #1 Outcome: Progressing as expected  Short Term Goal # 1: Patient will participate in pre-feeding trials with SLP only with no overt s/sx of aspiration  Goal Outcome # 1: Goal met, new goal added  Short Term Goal # 1 B : Patient will participate in instrumental swallow study to objectively assess swallow function and informed POC  Goal Outcome  # 1 B: Goal met  Short Term Goal # 2: Pt will complete 20 reps each of swallow exercises targeting pharyngeal shortening, laryngeal vestibule closure and base of tongue retraction with \"good\" acuracy  Goal Outcome # 2 : Progressing as expected  Short Term Goal # 3: Pt will consume diet of RG/TN given 1:1 spv and strategy use with no overt s/sx of aspiration or worsening of lung status.      Tammy Irby, SLP   "

## 2023-10-16 NOTE — CARE PLAN
The patient is Stable - Low risk of patient condition declining or worsening    Shift Goals  Clinical Goals: Monitor vitals, safety  Patient Goals: sleep  Family Goals: jayesh    Progress made toward(s) clinical / shift goals:  YES    Patient is not progressing towards the following goals:      Problem: Optimal Care for Alcohol Withdrawal  Goal: Optimal Care for the alcohol withdrawal patient  Outcome: Progressing     Problem: Seizure Precautions  Goal: Implementation of seizure precautions  Outcome: Progressing     Problem: Lifestyle Changes  Goal: Patient's ability to identify lifestyle changes and available resources to help reduce recurrence of condition will improve  Outcome: Progressing     Problem: Psychosocial  Goal: Patient's level of anxiety will decrease  Outcome: Progressing  Goal: Spiritual and cultural needs incorporated into hospitalization  Outcome: Progressing     Problem: Risk for Aspiration  Goal: Patient's risk for aspiration will be absent or decrease  Outcome: Progressing     Problem: Fall Risk  Goal: Patient will remain free from falls  Outcome: Progressing     Problem: Knowledge Deficit - Standard  Goal: Patient and family/care givers will demonstrate understanding of plan of care, disease process/condition, diagnostic tests and medications  Outcome: Progressing     Problem: Skin Integrity  Goal: Skin integrity is maintained or improved  Outcome: Progressing     Problem: Safety - Medical Restraint  Goal: Remains free of injury from restraints (Restraint for Interference with Medical Device)  Outcome: Progressing  Goal: Free from restraint(s) (Restraint for Interference with Medical Device)  Outcome: Progressing     Problem: Pain - Standard  Goal: Alleviation of pain or a reduction in pain to the patient’s comfort goal  Outcome: Progressing

## 2023-10-16 NOTE — THERAPY
"Speech Language Pathology   Daily Treatment     Patient Name: Balwinder Grimes  AGE:  72 y.o., SEX:  male  Medical Record #: [id number]  Date of Service: 10/16/2023      Precautions: fall risk, swallow precautions, pulled NGT      Subjective  Pt agreeable and cooperative with SLP tx tasks. At first perseverative on wrist restraints - \"I need these to come off, I can't get out of here.\" Became more agreeable with PO, stated his FEES last week \"hurt badly\" and that he recalls \"I wasn't swallowing very well.\"    RN reports his NGT was removed. Also reports that pt has been gurgly with NGT and spitting up tube feed, which has improved since turning his tube feeding off.      Assessment  Pt seen for dysphagia management. NC out of place upon SLP arrival with sats in the low/mid 80s. NC replaced - on 5L NC satting in 90s. Pt in wrist restraints, following commands.    Trials of ice, cup sips TN0 water, and bites PU4 provided. Pt did attempt effortful swallow per his report; no visually perceived change in swallow when using effortful vs. normal swallows. Appropriate oral bolus stripping and containment with limited trials. Presumed total AP transit and effective bolus formation. FNo overt s/sx of aspiration noted w/ single and sequential sips of TN0 water. No cough/clear, no change in vocal quality, which is improved bedside presentation from prior sessions. One to two swallows appreciated per bolus.     Pt agreeable to repeat study, \"I would rather do the x-ray\" compared to FEES. Tentatively planned for 1:30 pm this date.       Clinical Impressions  Patient presents with moderate-severe oropharyngeal dysphagia per FEES (10/13). Given that pt's nutrition source (NGT) was removed, his tolerance of same may have been poor, and that bedside performance this date is improved, recommend repeat diagnostic evaluation with MBSS.      Recommendations  NPO with ice  Instrumentation: VFSS (MBSS)  Medication: Non Oral, Crush with " "applesauce, as appropriate  Oral Care: Q4h       SLP Treatment Plan  Treatment Plan: Dysphagia Treatment, Patient/Family/Caregiver Training  SLP Frequency: 4x Per Week  Estimated Duration: Until Therapy Goals Met      Anticipated Discharge Needs  Discharge Recommendations: Recommend post-acute placement for additional speech therapy services prior to discharge home  Therapy Recommendations Upon DC: Dysphagia Training, Community Re-Integration, Patient / Family / Caregiver Education      Patient / Family Goals  Patient / Family Goal #1: \"water\"  Goal #1 Outcome: Progressing as expected  Short Term Goals  Short Term Goal # 1: Patient will participate in pre-feeding trials with SLP only with no overt s/sx of aspiration  Goal Outcome # 1: Goal met, new goal added  Short Term Goal # 1 B : Patient will participate in instrumental swallow study to objectively assess swallow function and informed POC  Goal Outcome  # 1 B: Progressing as expected      KATELYN Gaffney  "

## 2023-10-16 NOTE — CARE PLAN
The patient is Watcher - Medium risk of patient condition declining or worsening    Shift Goals  Clinical Goals: Monitor vitals, safety  Patient Goals: sleep  Family Goals: jayesh    Progress made toward(s) clinical / shift goals:    Problem: Optimal Care for Alcohol Withdrawal  Goal: Optimal Care for the alcohol withdrawal patient  10/16/2023 0248 by Christi Srinivasan R.N.  Outcome: Progressing  10/16/2023 0244 by Christi Srinivasan R.N.  Outcome: Progressing     Problem: Seizure Precautions  Goal: Implementation of seizure precautions  10/16/2023 0248 by Christi Srinivasan R.N.  Outcome: Progressing  10/16/2023 0244 by Christi Srinivasan R.N.  Outcome: Progressing     Problem: Lifestyle Changes  Goal: Patient's ability to identify lifestyle changes and available resources to help reduce recurrence of condition will improve  Outcome: Progressing     Problem: Psychosocial  Goal: Patient's level of anxiety will decrease  Outcome: Progressing  Goal: Spiritual and cultural needs incorporated into hospitalization  Outcome: Progressing     Problem: Risk for Aspiration  Goal: Patient's risk for aspiration will be absent or decrease  Outcome: Progressing     Problem: Fall Risk  Goal: Patient will remain free from falls  Outcome: Progressing     Problem: Knowledge Deficit - Standard  Goal: Patient and family/care givers will demonstrate understanding of plan of care, disease process/condition, diagnostic tests and medications  Outcome: Progressing     Problem: Skin Integrity  Goal: Skin integrity is maintained or improved  10/16/2023 0248 by Christi Srinivasan R.N.  Outcome: Progressing     Problem: Safety - Medical Restraint  Goal: Remains free of injury from restraints (Restraint for Interference with Medical Device)  10/16/2023 0248 by Christi Srinivasan R.N.  Outcome: Progressing    Problem: Pain - Standard  Goal: Alleviation of pain or a reduction in pain to the patient’s comfort  goal  10/16/2023 0248 by Christi Srinivasan R.NQueta  Outcome: Progressing    Patient is not progressing towards the following goals:      Problem: Safety - Medical Restraint  Goal: Free from restraint(s) (Restraint for Interference with Medical Device)  10/16/2023 0248 by SALO Ireland.N.  Outcome: Not Progressing     Pt remains free of injury from restraints. Restraints unable to be discontinued due to patient pulling on lines and feeding tube.

## 2023-10-16 NOTE — PROGRESS NOTES
Bedside report received and patient care assumed. Pt is resting in bed, A&O3, with no complaints of pain, and is on 3 L NC. Tele box on. All fall precautions are in place, belongings at bedside table.  Pt was updated on POC, no questions or concerns. Pt educated on use of call light for assistance.

## 2023-10-16 NOTE — DISCHARGE PLANNING
1355  DPA sent referral to [facility] referral for [facility] per choice form and CM RN.

## 2023-10-16 NOTE — PROGRESS NOTES
Hospital Medicine Daily Progress Note    Date of Service  10/16/2023    Chief Complaint  Balwinder Grimes is a 72 y.o. male admitted 10/4/2023 with fall, alcohol abuse    Hospital Course  72 y.o. male who presented 10/4/2023 with a past med history significant for hypertension, dyslipidemia, alcohol abuse, admitted to the hospital in October for after syncopal event resulting in forehead trauma, the patient had complained of increasing dizziness for the last 2 weeks, had accidentally taken double dose of doxazosin reportedly, reported a recent discharge from alcohol rehab and told that he had been sober for 10 days, he had an elevated alcohol level on admission, he was admitted to the [facility] family medicine service for syncope work-up and alcohol withdrawal, reportedly the patient was aggressively treated for tremors and suppose it CIWA scores with elevation, received 20 mg of Ativan, subsequently found obtunded and hypoxic requiring escalation to the ICU level of care, had a large amount of oral secretions, suctioned, chest x-ray positive for bilateral lower lobe infiltrates, was given flumazenil with some level of improvement, moved to the ICU level of care for the patient was intubated, successfully extubated on 10/10/2023, the patient remains significantly confused, appears sedate, wet breath sounds, chest x-ray with right greater than left effusion, infiltrates, currently not a historian  Per the patient's sister the patient has been recently deteriorating in terms of his alcohol intake reportedly.    Interval Problem Update  Patient seen and examined today.  Data, Medication data reviewed.  Case discussed with nursing as available.  Plan of Care reviewed with patient and notified of changes.   10/12 patient reportedly awake all night, constantly attempting to remove catheters, lines, agitated, medicated with Haldol, tachycardic during his agitation episodes, on my examination the patient finally fell  asleep,  Currently afebrile, heart rate in the 90s, respiration unlabored, rhonchorous, saturating 92% on 2 L nasal cannula oxygen,  With a count of 6.8, hemoglobin 13.4, hematocrit 38, platelet count 225,  Sodium 141, potassium 2.9, chloride 99, bicarb 30, glucose 118, BUN is 8, creatinine 0.68,  BNP is 2401  10/13 the patient appears improved today, drowsy and somewhat lethargic but responds adequately, afebrile, heart rate in the 70s to 80s, respiration unlabored, currently on 2 L nasal cannula oxygen saturating in the low to mid 90s, blood pressure in the 1 teens to 160s over 80s  The patient was reevaluated by FEES and unfortunately failed  10/14: Patient seen and examined, confused, on restrains, now on tube feeds as he has failed FEES.   Also now having urinary retention so will place copeland still on 2-3 L of oxygen will vinny as tolerated.  Transferred from ICU overnight   10/15: Patient resting in bed, afebrile, still confused and on restrains, on tube feeds, on 5L of oxygen wean as tolerated  Hypokalemia replete  10/16: Patient seen and examined afebrile, still confused, patient pulled his NG tube today, will place a core track for tube feeding, speech will also evaluate     I have discussed this patient's plan of care and discharge plan at IDT rounds today with Case Management, Nursing, Nursing leadership, and other members of the IDT team.    Consultants/Specialty  critical care    Code Status  Full Code    Disposition  The patient is not medically cleared for discharge to home or a post-acute facility.      I have placed the appropriate orders for post-discharge needs.    Review of Systems  Review of Systems   Unable to perform ROS: Mental acuity   Constitutional:  Positive for malaise/fatigue.   HENT: Negative.     Eyes: Negative.    Respiratory:  Positive for cough and shortness of breath.    Cardiovascular: Negative.    Gastrointestinal: Negative.    Genitourinary: Negative.    Musculoskeletal:  Negative.    Skin: Negative.    Neurological: Negative.    Endo/Heme/Allergies: Negative.    Psychiatric/Behavioral:  Positive for substance abuse. The patient is nervous/anxious.    All other systems reviewed and are negative.       Physical Exam  Temp:  [36.7 °C (98.1 °F)-37.1 °C (98.8 °F)] 37.1 °C (98.8 °F)  Pulse:  [75-83] 80  Resp:  [18] 18  BP: (113-140)/(61-74) 140/74  SpO2:  [93 %-97 %] 95 %    Physical Exam  Vitals and nursing note reviewed.   Constitutional:       Appearance: He is well-developed.      Comments: Pt seen and examined.  Lethargic, easily arousable   HENT:      Head: Normocephalic and atraumatic.      Comments: Facial rash  Eyes:      Pupils: Pupils are equal, round, and reactive to light.   Cardiovascular:      Rate and Rhythm: Normal rate and regular rhythm.      Heart sounds: Normal heart sounds.   Pulmonary:      Effort: Pulmonary effort is normal.      Breath sounds: Rhonchi and rales present.   Abdominal:      General: Bowel sounds are normal.      Palpations: Abdomen is soft.   Musculoskeletal:         General: Normal range of motion.      Cervical back: Normal range of motion and neck supple.   Skin:     General: Skin is warm and dry.      Findings: Bruising, erythema and rash present.   Neurological:      General: No focal deficit present.      Mental Status: He is disoriented.   Psychiatric:      Comments: Unable to assess         Fluids    Intake/Output Summary (Last 24 hours) at 10/16/2023 1333  Last data filed at 10/16/2023 0600  Gross per 24 hour   Intake 430 ml   Output 1300 ml   Net -870 ml         Laboratory  Recent Labs     10/14/23  0300 10/15/23  1159 10/16/23  0400   WBC 6.4 8.2 8.7   RBC 3.91* 3.97* 3.85*   HEMOGLOBIN 13.5* 14.1 13.2*   HEMATOCRIT 39.3* 40.8* 39.7*   .5* 102.8* 103.1*   MCH 34.5* 35.5* 34.3*   MCHC 34.4 34.6 33.2   RDW 42.4 43.6 44.0   PLATELETCT 264 296 309   MPV 10.0 9.9 9.9       Recent Labs     10/14/23  0300 10/15/23  1159 10/16/23  0408    SODIUM 142 143 144   POTASSIUM 2.9* 4.2 4.2   CHLORIDE 101 102 103   CO2 30 35* 36*   GLUCOSE 118* 153* 139*   BUN 15 15 15   CREATININE 0.59 0.58 0.65   CALCIUM 8.7 8.7 8.8                     Imaging  DX-ABDOMEN FOR TUBE PLACEMENT   Final Result         1.  Nonspecific bowel gas pattern in the upper abdomen.   2.  Nasogastric tube is coiled within the stomach, the tip terminates overlying the expected location of the gastric cardia.   3.  Cardiomegaly      DX-ABDOMEN FOR TUBE PLACEMENT   Final Result         1.  Nonspecific bowel gas pattern in the upper abdomen.   2.  Nasogastric tube terminating just distal to the gastroesophageal junction, recommend advancement.   3.  Hazy bilateral pulmonary edema and/or infiltrates   4.  Cardiomegaly      DX-ABDOMEN FOR TUBE PLACEMENT   Final Result      1.  Enteric tube overlies the gastric body.      DX-ABDOMEN FOR TUBE PLACEMENT   Final Result      1.  NG tube side port projects at the gastroesophageal junction. Recommend advancing before use.   2.  Bilateral pleural effusions with adjacent airspace disease.      IR-MIDLINE CATHETER INSERTION WO GUIDANCE > AGE 3   Final Result                  Ultrasound-guided midline placement performed by qualified nursing staff    as above.          DX-CHEST-PORTABLE (1 VIEW)   Final Result      RIGHT larger than LEFT pleural effusion with overlying atelectasis and/or airspace disease and suspected superimposed pulmonary edema, increased since prior      EC-ECHOCARDIOGRAM COMPLETE W/O CONT   Final Result      DX-CHEST-PORTABLE (1 VIEW)   Final Result      RIGHT larger than LEFT pleural effusion with overlying atelectasis and/or airspace disease and suspected superimposed pulmonary edema, unchanged      DX-ABDOMEN FOR TUBE PLACEMENT   Final Result         Gastric drainage tube with tip projecting over the expected area of the stomach.      DX-ABDOMEN FOR TUBE PLACEMENT   Final Result         Gastric drainage tube coiled in the stomach  with tip projecting over the expected area of the distal stomach.      DX-CHEST-PORTABLE (1 VIEW)   Final Result      1.  Likely layering bilateral pleural effusions.   2.  Interstitial infiltrates and/or edema.   3.  Support apparatus as above.      DX-CHEST-PORTABLE (1 VIEW)   Final Result      1.  Pulmonary edema. Multifocal pneumonia could have a similar appearance.   2.  Likely small/moderate bilateral pleural effusions.      DX-CHEST-PORTABLE (1 VIEW)   Final Result      No acute cardiopulmonary abnormality.      CT-CSPINE WITHOUT PLUS RECONS   Final Result      No acute fracture or dislocation of the cervical spine.      CT-HEAD W/O   Final Result      1.  Cerebral atrophy.      2.  White matter lucencies most consistent with small vessel ischemic change versus demyelination or gliosis.      3.  Otherwise, Head CT without contrast with no acute findings. No evidence of acute cerebral infarction, hemorrhage or mass lesion.         DX-ESOPHAGUS - [id number]    (Results Pending)        Assessment/Plan  * Respiratory failure requiring intubation (HCC)- (present on admission)  Assessment & Plan  Secondary to aspiration pneumonitis, atelectasis. altered mental status, alcohol withdrawal with delirium, hypercarbic respiratory failure, benzodiazepine overdose  Telemetry monitoring   Continuous pulse oximetry   Goal saturation >92%   Head of bed > 30 degree   GI prophylaxis   Respiratory treatments: prn       Oropharyngeal dysphagia  Assessment & Plan  The patient will need to have a feeding tube placement tube feeds    Urinary tract infection- (present on admission)  Assessment & Plan  Febrile with flank pain  Urine Cx (10/8) - E. Faecalis, sensitive to ampicillin, nitrofurantoin, penicillin  Antibiotics for 5-day course    Aspiration pneumonitis (HCC)- (present on admission)  Assessment & Plan  Secondary to alcohol withdrawal syndrome  Blood cultures, sputum culture, procalcitonin  Antibiotics, secretion  management  Ongoing aspiration precautions, SLP evaluation  Fiberoptic exam on 10/13 leaves the patient still in n.p.o. status    Fever- (present on admission)  Assessment & Plan  Respiratory panel negative, although patient at risk of respiratory infection, aspiration  -UA and urine culture obtained with copeland placement on 10/8/23 due to flank pain   Positive culture, recheck blood cultures, ongoing antibiotic treatment    Conjunctivitis of both eyes- (present on admission)  Assessment & Plan  Bilateral conjunctivitis of the eye, with dried discharge on the eyelids.   Trimethoprim-polymyxin four times daily for 7 days    Elevated troponin- (present on admission)  Assessment & Plan  Secondary to demand ischemia    Macrocytic anemia- (present on admission)  Assessment & Plan  Pt with hemoglobin of 12.7 and MCV at 104.5 on admission; Likely due to heavy alcohol use.   - Continue to monitor CBC    - Continue multivitamin and thiamine supplement     Thrombocytopenia (HCC)- (present on admission)  Assessment & Plan  Slowly improving, likely alcohol related  Monitor with daily CBC    Essential tremor- (present on admission)  Assessment & Plan  Monitor    Hypokalemia- (present on admission)  Assessment & Plan  Replete and monitor closely    Alcohol withdrawal syndrome, with delirium (HCC)- (present on admission)  Assessment & Plan  Admitted on 10/6, reported drinks 1-2 bottles of wine  Transferred to ICU for Precedex drip with scheduled and as needed Ativan monitoring RASS  Aspiration and seizure precautions  Continue vitamin supplementation  Eventual alcohol cessation education and resources to be provided when appropriate    Syncope and collapse- (present on admission)  Assessment & Plan  Admitted for syncope 10/4, likely alcohol related  Echo (10/10/23): EF 55%, no valvular abnormalities  Continuous telemetry monitoring  Fall precautions  PT/OT    Neuropathy- (present on admission)  Assessment & Plan  Continue  Cymbalta    Hyperlipidemia- (present on admission)  Assessment & Plan  Continue rosuvastatin    Benign prostatic hyperplasia with urinary retention- (present on admission)  Assessment & Plan  Continue doxazosin    Primary hypertension- (present on admission)  Assessment & Plan  Continue doxazosin, losartan as tolerated  Diuresis monitor        VTE prophylaxis: lovenox sc     I have performed a physical exam and reviewed and updated ROS and Plan today (10/16/2023). In review of yesterday's note (10/15/2023), there are no changes except as documented above.      Please note that this dictation was created using voice recognition software. I have made every reasonable attempt to correct obvious errors, but I expect that there are errors of grammar and possibly context that I did not discover before finalizing the note.

## 2023-10-16 NOTE — DISCHARGE PLANNING
Case Management Discharge Planning    Admission Date: 10/4/2023  GMLOS: 5  ALOS: 12    6-Clicks ADL Score: 14  6-Clicks Mobility Score: 6  PT and/or OT Eval ordered: Yes  Post-acute Referrals Ordered: No  Post-acute Choice Obtained: Yes  Has referral(s) been sent to post-acute provider:  Yes  SNF referrals sent 10/6/23  LTAC referral sent 10/16/23      Anticipated Discharge Dispo: Discharge Disposition: D/T to SNF with Medicare cert in anticipation of skilled care (03)  Discharge Address:   Discharge Contact Phone Number:     DME Needed: No - will be determined at post acute level of care    Action(s) Taken: Updated Provider/Nurse on Discharge Plan, Choice obtained, and Referral(s) sent  Per prior notes (10/6/23) goal for [facility] SNF's near [facility] and pts son, Gerardo Grimes, is to be patients primary point of contact d/t pts spouse has dementia.  Case discussed in IDT rounds, pts care needs may be too high  for SNF and MD recommend for LTAC consideration.  RN ELIAS spoke with pts son, Terrell Grimes ([phone]), and discussed above. He agrees with exploring LTAC options, preference is [facility] in [place].  He also reports we can expand the SNF referrals into [place] area, no preference on facility.   Communicated with Alma GALLARDO, to request LTAC referral to [facility] and to expand the SNF referrals to the [place] area - no preference on facility.    Escalations Completed: Provider    Medically Clear: No    Next Steps: follow up on LTAC referral to see if candidate. Follow up on SNF referrals if not able to go to LTAC.     Barriers to Discharge: Medical clearance and Pending Placement, ongoing need for restraints    Is the patient up for discharge tomorrow: No

## 2023-10-17 LAB
ANION GAP SERPL CALC-SCNC: 7 MMOL/L (ref 7–16)
BUN SERPL-MCNC: 16 MG/DL (ref 8–22)
CALCIUM SERPL-MCNC: 9.2 MG/DL (ref 8.5–10.5)
CHLORIDE SERPL-SCNC: 102 MMOL/L (ref 96–112)
CO2 SERPL-SCNC: 34 MMOL/L (ref 20–33)
CREAT SERPL-MCNC: 0.64 MG/DL (ref 0.5–1.4)
ERYTHROCYTE [DISTWIDTH] IN BLOOD BY AUTOMATED COUNT: 44.3 FL (ref 35.9–50)
GFR SERPLBLD CREATININE-BSD FMLA CKD-EPI: 100 ML/MIN/1.73 M 2
GLUCOSE SERPL-MCNC: 103 MG/DL (ref 65–99)
HCT VFR BLD AUTO: 41.4 % (ref 42–52)
HGB BLD-MCNC: 13.9 G/DL (ref 14–18)
MCH RBC QN AUTO: 35.2 PG (ref 27–33)
MCHC RBC AUTO-ENTMCNC: 33.6 G/DL (ref 32.3–36.5)
MCV RBC AUTO: 104.8 FL (ref 81.4–97.8)
PLATELET # BLD AUTO: 286 K/UL (ref 164–446)
PMV BLD AUTO: 10.1 FL (ref 9–12.9)
POTASSIUM SERPL-SCNC: 4.2 MMOL/L (ref 3.6–5.5)
RBC # BLD AUTO: 3.95 M/UL (ref 4.7–6.1)
SODIUM SERPL-SCNC: 143 MMOL/L (ref 135–145)
WBC # BLD AUTO: 8.2 K/UL (ref 4.8–10.8)

## 2023-10-17 PROCEDURE — 80048 BASIC METABOLIC PNL TOTAL CA: CPT

## 2023-10-17 PROCEDURE — 700102 HCHG RX REV CODE 250 W/ 637 OVERRIDE(OP)

## 2023-10-17 PROCEDURE — 302146: Performed by: HOSPITALIST

## 2023-10-17 PROCEDURE — 700111 HCHG RX REV CODE 636 W/ 250 OVERRIDE (IP): Mod: JZ | Performed by: STUDENT IN AN ORGANIZED HEALTH CARE EDUCATION/TRAINING PROGRAM

## 2023-10-17 PROCEDURE — 36415 COLL VENOUS BLD VENIPUNCTURE: CPT

## 2023-10-17 PROCEDURE — 700111 HCHG RX REV CODE 636 W/ 250 OVERRIDE (IP): Mod: JZ | Performed by: INTERNAL MEDICINE

## 2023-10-17 PROCEDURE — 51798 US URINE CAPACITY MEASURE: CPT

## 2023-10-17 PROCEDURE — A9270 NON-COVERED ITEM OR SERVICE: HCPCS | Performed by: INTERNAL MEDICINE

## 2023-10-17 PROCEDURE — 92526 ORAL FUNCTION THERAPY: CPT

## 2023-10-17 PROCEDURE — 85027 COMPLETE CBC AUTOMATED: CPT

## 2023-10-17 PROCEDURE — 99231 SBSQ HOSP IP/OBS SF/LOW 25: CPT | Performed by: HOSPITALIST

## 2023-10-17 PROCEDURE — 700102 HCHG RX REV CODE 250 W/ 637 OVERRIDE(OP): Performed by: INTERNAL MEDICINE

## 2023-10-17 PROCEDURE — A9270 NON-COVERED ITEM OR SERVICE: HCPCS

## 2023-10-17 PROCEDURE — 770006 HCHG ROOM/CARE - MED/SURG/GYN SEMI*

## 2023-10-17 RX ADMIN — DULOXETINE HYDROCHLORIDE 30 MG: 30 CAPSULE, DELAYED RELEASE ORAL at 05:20

## 2023-10-17 RX ADMIN — Medication 100 MG: at 05:20

## 2023-10-17 RX ADMIN — NYSTATIN 500000 UNITS: 100000 SUSPENSION ORAL at 22:06

## 2023-10-17 RX ADMIN — NYSTATIN 500000 UNITS: 100000 SUSPENSION ORAL at 08:10

## 2023-10-17 RX ADMIN — CLOBETASOL PROPIONATE CREAM USP, 0.05%: 0.5 CREAM TOPICAL at 05:25

## 2023-10-17 RX ADMIN — THERA TABS 1 TABLET: TAB at 05:21

## 2023-10-17 RX ADMIN — ROSUVASTATIN CALCIUM 20 MG: 20 TABLET, FILM COATED ORAL at 17:11

## 2023-10-17 RX ADMIN — FUROSEMIDE 20 MG: 10 INJECTION, SOLUTION INTRAVENOUS at 16:06

## 2023-10-17 RX ADMIN — FUROSEMIDE 20 MG: 10 INJECTION, SOLUTION INTRAVENOUS at 05:21

## 2023-10-17 RX ADMIN — VALPROIC ACID 250 MG: 250 CAPSULE, LIQUID FILLED ORAL at 22:06

## 2023-10-17 RX ADMIN — ENOXAPARIN SODIUM 40 MG: 100 INJECTION SUBCUTANEOUS at 17:14

## 2023-10-17 RX ADMIN — NYSTATIN 500000 UNITS: 100000 SUSPENSION ORAL at 13:04

## 2023-10-17 RX ADMIN — CLOBETASOL PROPIONATE CREAM USP, 0.05%: 0.5 CREAM TOPICAL at 17:11

## 2023-10-17 RX ADMIN — VALPROIC ACID 250 MG: 250 CAPSULE, LIQUID FILLED ORAL at 13:04

## 2023-10-17 RX ADMIN — POTASSIUM BICARBONATE 50 MEQ: 978 TABLET, EFFERVESCENT ORAL at 05:20

## 2023-10-17 RX ADMIN — POTASSIUM BICARBONATE 50 MEQ: 978 TABLET, EFFERVESCENT ORAL at 17:11

## 2023-10-17 RX ADMIN — DOCUSATE SODIUM 50 MG AND SENNOSIDES 8.6 MG 2 TABLET: 8.6; 5 TABLET, FILM COATED ORAL at 05:20

## 2023-10-17 RX ADMIN — LOSARTAN POTASSIUM 50 MG: 50 TABLET, FILM COATED ORAL at 05:20

## 2023-10-17 RX ADMIN — VALPROIC ACID 250 MG: 250 CAPSULE, LIQUID FILLED ORAL at 05:21

## 2023-10-17 RX ADMIN — NYSTATIN 500000 UNITS: 100000 SUSPENSION ORAL at 17:11

## 2023-10-17 RX ADMIN — FOLIC ACID 1 MG: 1 TABLET ORAL at 05:21

## 2023-10-17 ASSESSMENT — ENCOUNTER SYMPTOMS
WEAKNESS: 1
HEADACHES: 0
BACK PAIN: 0
CHILLS: 0
BLURRED VISION: 0
NAUSEA: 0
SORE THROAT: 0
PALPITATIONS: 0
DIZZINESS: 0
SHORTNESS OF BREATH: 0
COUGH: 1
FEVER: 0
VOMITING: 0
SPUTUM PRODUCTION: 1
DOUBLE VISION: 0
ABDOMINAL PAIN: 0
DIARRHEA: 0
LOSS OF CONSCIOUSNESS: 0

## 2023-10-17 ASSESSMENT — FIBROSIS 4 INDEX: FIB4 SCORE: 1.29

## 2023-10-17 ASSESSMENT — PAIN DESCRIPTION - PAIN TYPE: TYPE: ACUTE PAIN

## 2023-10-17 NOTE — CARE PLAN
The patient is Watcher - Medium risk of patient condition declining or worsening    Shift Goals  Clinical Goals: Monitor vitals, safety  Patient Goals: sleep  Family Goals: jayesh    Progress made toward(s) clinical / shift goals:    Problem: Lifestyle Changes  Goal: Patient's ability to identify lifestyle changes and available resources to help reduce recurrence of condition will improve  Outcome: Not Progressing     Problem: Psychosocial  Goal: Patient's level of anxiety will decrease  Outcome: Not Progressing  Goal: Spiritual and cultural needs incorporated into hospitalization  Outcome: Not Progressing     Problem: Fall Risk  Goal: Patient will remain free from falls  Outcome: Not Progressing     Problem: Knowledge Deficit - Standard  Goal: Patient and family/care givers will demonstrate understanding of plan of care, disease process/condition, diagnostic tests and medications  Outcome: Not Progressing     Problem: Safety - Medical Restraint  Goal: Remains free of injury from restraints (Restraint for Interference with Medical Device)  Outcome: Not Progressing  Goal: Free from restraint(s) (Restraint for Interference with Medical Device)  Outcome: Not Progressing       Patient is not progressing towards the following goals:      Problem: Lifestyle Changes  Goal: Patient's ability to identify lifestyle changes and available resources to help reduce recurrence of condition will improve  Outcome: Not Progressing     Problem: Psychosocial  Goal: Patient's level of anxiety will decrease  Outcome: Not Progressing  Goal: Spiritual and cultural needs incorporated into hospitalization  Outcome: Not Progressing     Problem: Fall Risk  Goal: Patient will remain free from falls  Outcome: Not Progressing     Problem: Knowledge Deficit - Standard  Goal: Patient and family/care givers will demonstrate understanding of plan of care, disease process/condition, diagnostic tests and medications  Outcome: Not Progressing     Problem:  Safety - Medical Restraint  Goal: Remains free of injury from restraints (Restraint for Interference with Medical Device)  Outcome: Not Progressing  Goal: Free from restraint(s) (Restraint for Interference with Medical Device)  Outcome: Not Progressing

## 2023-10-17 NOTE — CARE PLAN
The patient is Stable - Low risk of patient condition declining or worsening    Shift Goals  Clinical Goals: Remain free from falls, safety  Patient Goals: Rest  Family Goals: jayesh    Progress made toward(s) clinical / shift goals:    Problem: Risk for Aspiration  Goal: Patient's risk for aspiration will be absent or decrease  Outcome: Progressing     Problem: Skin Integrity  Goal: Skin integrity is maintained or improved  Outcome: Progressing     Problem: Safety - Medical Restraint  Goal: Remains free of injury from restraints (Restraint for Interference with Medical Device)  Outcome: Met  Goal: Free from restraint(s) (Restraint for Interference with Medical Device)  Outcome: Met       Patient is not progressing towards the following goals:

## 2023-10-17 NOTE — PROGRESS NOTES
Monitor Summary:     Rhythm: SR  Rate: 75-98  Ectopy: PVC(R)  Measurements: .16/.07/.33                 12 Hour Chart Check

## 2023-10-17 NOTE — PROGRESS NOTES
Hospital Medicine Daily Progress Note    Date of Service  10/17/2023    Chief Complaint  Balwinder Grimes is a 72 y.o. male admitted 10/4/2023 with altered mental status    Hospital Course  71yo history of alcohol abuse, hypertension, dyslipidemia presenting after syncopal event at home.  He had recently been in an inpatient alcohol detox program, but was found to be intoxicated on presentation.  In-house he underwent treatment for withdrawal with CIWA protocol at 1 point he was found to be hypoxic likely from Ativan, admitted to the ICU, there is a question of aspiration at that time.  Chest x-ray showed bilateral infiltrates and he was transferred to the ICU where he was intubated on October 9.  He went on to be extubated on October 10 and then came back to the floor with significant confusion    Interval Problem Update  Pt states he feels weak, but definitely is feeling better than he has over the last few days.    I have discussed this patient's plan of care and discharge plan at IDT rounds today with Case Management, Nursing, Nursing leadership, and other members of the IDT team.    Consultants/Specialty      Code Status  Full Code    Disposition  The patient is medically cleared for discharge to home or a post-acute facility.      I have placed the appropriate orders for post-discharge needs.    Review of Systems  Review of Systems   Constitutional:  Negative for chills and fever.   HENT:  Negative for nosebleeds and sore throat.    Eyes:  Negative for blurred vision and double vision.   Respiratory:  Positive for cough and sputum production. Negative for shortness of breath.    Cardiovascular:  Negative for chest pain, palpitations and leg swelling.   Gastrointestinal:  Negative for abdominal pain, diarrhea, nausea and vomiting.   Genitourinary:  Negative for dysuria and urgency.   Musculoskeletal:  Negative for back pain.   Skin:  Negative for rash.   Neurological:  Positive for weakness. Negative for  dizziness, loss of consciousness and headaches.        Physical Exam  Temp:  [36.7 °C (98.1 °F)-37.1 °C (98.8 °F)] 36.7 °C (98.1 °F)  Pulse:  [69-83] 71  Resp:  [18] 18  BP: (124-152)/(63-80) 140/80  SpO2:  [91 %-98 %] 96 %    Physical Exam  Constitutional:       General: He is not in acute distress.     Appearance: He is well-developed. He is not diaphoretic.   HENT:      Head: Normocephalic and atraumatic.   Eyes:      Conjunctiva/sclera: Conjunctivae normal.   Neck:      Vascular: No JVD.   Cardiovascular:      Rate and Rhythm: Normal rate.      Heart sounds: No murmur heard.     No gallop.   Pulmonary:      Effort: Pulmonary effort is normal. No respiratory distress.      Breath sounds: No stridor. Rhonchi present. No wheezing or rales.   Abdominal:      Palpations: Abdomen is soft.      Tenderness: There is no abdominal tenderness. There is no guarding or rebound.   Musculoskeletal:         General: No tenderness.      Right lower leg: No edema.      Left lower leg: No edema.   Skin:     General: Skin is warm and dry.      Findings: No rash.   Neurological:      Mental Status: He is oriented to person, place, and time.   Psychiatric:         Mood and Affect: Mood normal.         Behavior: Behavior normal.         Thought Content: Thought content normal.         Fluids    Intake/Output Summary (Last 24 hours) at 10/17/2023 0737  Last data filed at 10/17/2023 0338  Gross per 24 hour   Intake --   Output 700 ml   Net -700 ml       Laboratory  Recent Labs     10/15/23  1159 10/16/23  0400 10/17/23  0124   WBC 8.2 8.7 8.2   RBC 3.97* 3.85* 3.95*   HEMOGLOBIN 14.1 13.2* 13.9*   HEMATOCRIT 40.8* 39.7* 41.4*   .8* 103.1* 104.8*   MCH 35.5* 34.3* 35.2*   MCHC 34.6 33.2 33.6   RDW 43.6 44.0 44.3   PLATELETCT 296 309 286   MPV 9.9 9.9 10.1     Recent Labs     10/15/23  1159 10/16/23  0400 10/17/23  0124   SODIUM 143 144 143   POTASSIUM 4.2 4.2 4.2   CHLORIDE 102 103 102   CO2 35* 36* 34*   GLUCOSE 153* 139* 103*    BUN 15 15 16   CREATININE 0.58 0.65 0.64   CALCIUM 8.7 8.8 9.2                   Imaging  DX-ESOPHAGUS - [id number]   Final Result      DX-ABDOMEN FOR TUBE PLACEMENT   Final Result         1.  Nonspecific bowel gas pattern in the upper abdomen.   2.  Nasogastric tube is coiled within the stomach, the tip terminates overlying the expected location of the gastric cardia.   3.  Cardiomegaly      DX-ABDOMEN FOR TUBE PLACEMENT   Final Result         1.  Nonspecific bowel gas pattern in the upper abdomen.   2.  Nasogastric tube terminating just distal to the gastroesophageal junction, recommend advancement.   3.  Hazy bilateral pulmonary edema and/or infiltrates   4.  Cardiomegaly      DX-ABDOMEN FOR TUBE PLACEMENT   Final Result      1.  Enteric tube overlies the gastric body.      DX-ABDOMEN FOR TUBE PLACEMENT   Final Result      1.  NG tube side port projects at the gastroesophageal junction. Recommend advancing before use.   2.  Bilateral pleural effusions with adjacent airspace disease.      IR-MIDLINE CATHETER INSERTION WO GUIDANCE > AGE 3   Final Result                  Ultrasound-guided midline placement performed by qualified nursing staff    as above.          DX-CHEST-PORTABLE (1 VIEW)   Final Result      RIGHT larger than LEFT pleural effusion with overlying atelectasis and/or airspace disease and suspected superimposed pulmonary edema, increased since prior      EC-ECHOCARDIOGRAM COMPLETE W/O CONT   Final Result      DX-CHEST-PORTABLE (1 VIEW)   Final Result      RIGHT larger than LEFT pleural effusion with overlying atelectasis and/or airspace disease and suspected superimposed pulmonary edema, unchanged      DX-ABDOMEN FOR TUBE PLACEMENT   Final Result         Gastric drainage tube with tip projecting over the expected area of the stomach.      DX-ABDOMEN FOR TUBE PLACEMENT   Final Result         Gastric drainage tube coiled in the stomach with tip projecting over the expected area of the distal  stomach.      DX-CHEST-PORTABLE (1 VIEW)   Final Result      1.  Likely layering bilateral pleural effusions.   2.  Interstitial infiltrates and/or edema.   3.  Support apparatus as above.      DX-CHEST-PORTABLE (1 VIEW)   Final Result      1.  Pulmonary edema. Multifocal pneumonia could have a similar appearance.   2.  Likely small/moderate bilateral pleural effusions.      DX-CHEST-PORTABLE (1 VIEW)   Final Result      No acute cardiopulmonary abnormality.      CT-CSPINE WITHOUT PLUS RECONS   Final Result      No acute fracture or dislocation of the cervical spine.      CT-HEAD W/O   Final Result      1.  Cerebral atrophy.      2.  White matter lucencies most consistent with small vessel ischemic change versus demyelination or gliosis.      3.  Otherwise, Head CT without contrast with no acute findings. No evidence of acute cerebral infarction, hemorrhage or mass lesion.              Assessment/Plan  * Respiratory failure requiring intubation (HCC)- (present on admission)  Assessment & Plan  Secondary to aspiration pneumonia and altered sensorium from alcohol withdrawal  Intubated October 9, subsequently extubated October 10  Currently on nasal cannula O2  Continue O2 and RT protocols  Mobilize  I-S      Oropharyngeal dysphagia  Assessment & Plan  Now on a modified diet per speech therapy  Continue to monitor for signs of aspiration    Urinary tract infection- (present on admission)  Assessment & Plan  Febrile with flank pain  Urine Cx (10/8) - E. Faecalis, sensitive to ampicillin, nitrofurantoin, penicillin  Completed antibiotics    Aspiration pneumonitis (HCC)- (present on admission)  Assessment & Plan  Secondary to alcohol withdrawal syndrome  Blood cultures have been negative as has the BAL  S/p completed course of antibiotics  Speech therapy has evaluated and he is on modified diet    Fever- (present on admission)  Assessment & Plan  Treated for aspiration pneumonia and Enterococcus faecalis UTI  Observe off  antibiotics    Conjunctivitis of both eyes- (present on admission)  Assessment & Plan  Completed topical therapy    Elevated troponin- (present on admission)  Assessment & Plan  Secondary to demand ischemia    Macrocytic anemia- (present on admission)  Assessment & Plan  Pt with hemoglobin of 12.7 and MCV at 104.5 on admission; Likely due to heavy alcohol use.   - Continue to monitor CBC    - Continue multivitamin and thiamine supplement and folate    Thrombocytopenia (HCC)- (present on admission)  Assessment & Plan  Now in the normal range    Essential tremor- (present on admission)  Assessment & Plan  Monitor    Hypokalemia- (present on admission)  Assessment & Plan  Replete and monitor closely    Alcohol withdrawal syndrome, with delirium (HCC)- (present on admission)  Assessment & Plan  Admitted on 10/6, reported drinks 1-2 bottles of wine  Initially admitted on a CIWA protocol, subsequently went to the ICU and required intubation but now is through his DTs.      Syncope and collapse- (present on admission)  Assessment & Plan  Admitted for syncope 10/4, likely alcohol related  Echo (10/10/23): EF 55%, no valvular abnormalities  Telemetry and EKG have been unremarkable in this regard  Patient has not had any further symptoms    Neuropathy- (present on admission)  Assessment & Plan  Continue Cymbalta    Hyperlipidemia- (present on admission)  Assessment & Plan  Continue rosuvastatin    Benign prostatic hyperplasia with urinary retention- (present on admission)  Assessment & Plan  Continue doxazosin    Primary hypertension- (present on admission)  Assessment & Plan  Continue doxazosin, losartan as tolerated  Diuresis monitor         VTE prophylaxis:    enoxaparin ppx      I have performed a physical exam and reviewed and updated ROS and Plan today (10/17/2023). In review of yesterday's note (10/16/2023), there are no changes except as documented above.

## 2023-10-17 NOTE — DISCHARGE PLANNING
Case Management Discharge Planning    Admission Date: 10/4/2023  GMLOS: 5  ALOS: 13    6-Clicks ADL Score: 14  6-Clicks Mobility Score: 6  PT and/or OT Eval ordered: Yes  Post-acute Referrals Ordered: No  Post-acute Choice Obtained: Yes  Has referral(s) been sent to post-acute provider:  Yes      Anticipated Discharge Dispo: Discharge Disposition: D/T to SNF with Medicare cert in anticipation of skilled care (03)  Discharge Address: [address] [place] [address]  Discharge Contact Phone Number: [phone]    DME Needed: No    Action(s) Taken: Updated Provider/Nurse on Discharge Plan, Referral(s) sent, and Acceptance Received  Pt has been started on oral diet, NG tube Dc'd 10/16/23. Restraints Dc'd 10/17/23.  Pt has been accepted to 3 SNF's: [facility], [facility], [facility]. - Pending family selection for choice - left voicemail with son    Escalations Completed: None    Medically Clear: No    Next Steps: RN ELIAS called and left voicemail with pts son, Terrell Grimes, [phone]. Advised of the acceptance to SNF's and requested callback from him to allow discussion on choice.    Barriers to Discharge: Medical clearance    Is the patient up for discharge tomorrow: No

## 2023-10-17 NOTE — THERAPY
"Speech Language Pathology   Daily Treatment     Patient Name: Balwinder Grimes  AGE:  72 y.o., SEX:  male  Medical Record #: [id number]  Date of Service: 10/17/2023      Precautions:  Precautions: Fall Risk, Swallow Precautions      Subjective  Pt agreeable and cooperative with SLP tx tasks. \"Alma got a nice yellow bird.\" He does recall MBSS, saying he got an x-ray of his \"swallowing\" and that \"I got cleared up.\"    RN reports fair performance with thins with some coughing.     Assessment  Pt seen for dysphagia management. On 5L NC, satting at 96%. Upright in bed. Thin water used to attempt swallow exercises.     Pt cued to take small, single single sips, which he consistently did not complete, instead drinking rapid, sequential sips over ~7 oz. After 7 oz, pt did have regurgitation of ~tsp amount of clear mucus; RN aware. Pt then completed swallow exercises without PO - effortful swallow x12 with good effort and fair accuracy, Analia x10 with good accuracy and effort. Further reviewed MBSS results and rationale for ongoing exercises.       Clinical Impressions  Pt presents with moderate-severe oropharyngeal dysphagia per MBSS on 10/16; also presents with bedside indicators concerning for esophageal dysfunction. Pt may benefit from GI; RN aware. Pt appears to be a good candidate for ongoing behavioral and exercise-based swallow rehabilitation. Dysphagia outcomes can be maximized with use of mobility as pt is able and frequent, thorough oral care.            Recommendations  Regular solids with thin liquids  Medication: Crush with applesauce, as appropriate, Crush with pudding/puree, as appropriate  Supervision: 1:1 feeding with constant supervision, Encourage self-feeding  Positioning: Fully upright and midline during oral intake and for ~30 minutes after PO  Strategies: Small bites/sips, Slow rate of intake, Multiple swallows (2-3) per bite/sips  Oral Care: BID  Additional Instrumentation: None  Consult " "Referral(s): Gastroenterologist           SLP Treatment Plan  Treatment Plan: Dysphagia Treatment, Patient/Family/Caregiver Training  SLP Frequency: 3x Per Week  Estimated Duration: Until Therapy Goals Met      Anticipated Discharge Needs  Discharge Recommendations: Recommend post-acute placement for additional speech therapy services prior to discharge home  Therapy Recommendations Upon DC: Dysphagia Training, Patient / Family / Caregiver Education, Community Re-Integration      Patient / Family Goals  Patient / Family Goal #1: \"water\"  Goal #1 Outcome: Progressing as expected  Short Term Goals  Short Term Goal # 1: Patient will participate in pre-feeding trials with SLP only with no overt s/sx of aspiration  Goal Outcome # 1: Goal met, new goal added  Short Term Goal # 1 B : Patient will participate in instrumental swallow study to objectively assess swallow function and informed POC  Goal Outcome  # 1 B: Goal met  Short Term Goal # 2: Pt will complete 20 reps each of swallow exercises targeting pharyngeal shortening, laryngeal vestibule closure and base of tongue retraction with \"good\" acuracy  Goal Outcome # 2 : Progressing as expected  Short Term Goal # 3: Pt will consume diet of RG/TN given 1:1 spv and strategy use with no overt s/sx of aspiration or worsening of lung status.  Goal Outcome  # 3: Progressing as expected      Tammy Irby, KATELYN  "

## 2023-10-17 NOTE — DIETARY
Nutrition Update:    Day 13 of admit.  Balwinder Grimes is a 72 y.o. male with admitting DX of Syncope and collapse [R55].  Patient being followed to optimize nutrition.    Current Diet: regular    Pt previously on tube feed. SLP cleared pt for regular diet 10/16. Tube was removed and tf orders dc. Only one meal documented so far. Pt eating <25%. RD to order supplements to encourage nutrient needs being met. Pt with a refeeding risk, see 10/14 RD note. Ordering phos and mg through the 21 to continue to assess.    Problem: Nutritional:  Goal: Achieve adequate nutritional intake  Description: Patient will consume >50% of meals    RD following.

## 2023-10-18 ENCOUNTER — APPOINTMENT (OUTPATIENT)
Dept: RADIOLOGY | Facility: MEDICAL CENTER | Age: 73
DRG: 896 | End: 2023-10-18
Attending: HOSPITALIST
Payer: MEDICARE

## 2023-10-18 LAB
FLUAV RNA SPEC QL NAA+PROBE: NEGATIVE
FLUBV RNA SPEC QL NAA+PROBE: NEGATIVE
MAGNESIUM SERPL-MCNC: 2.5 MG/DL (ref 1.5–2.5)
PHOSPHATE SERPL-MCNC: 3.6 MG/DL (ref 2.5–4.5)
PROCALCITONIN SERPL-MCNC: 0.11 NG/ML
RSV RNA SPEC QL NAA+PROBE: NEGATIVE
SARS-COV-2 RNA RESP QL NAA+PROBE: NOTDETECTED
SPECIMEN SOURCE: NORMAL

## 2023-10-18 PROCEDURE — 700111 HCHG RX REV CODE 636 W/ 250 OVERRIDE (IP): Mod: JZ | Performed by: STUDENT IN AN ORGANIZED HEALTH CARE EDUCATION/TRAINING PROGRAM

## 2023-10-18 PROCEDURE — 83735 ASSAY OF MAGNESIUM: CPT

## 2023-10-18 PROCEDURE — 0241U HCHG SARS-COV-2 COVID-19 NFCT DS RESP RNA 4 TRGT MIC: CPT

## 2023-10-18 PROCEDURE — 90471 IMMUNIZATION ADMIN: CPT

## 2023-10-18 PROCEDURE — 84100 ASSAY OF PHOSPHORUS: CPT

## 2023-10-18 PROCEDURE — 92526 ORAL FUNCTION THERAPY: CPT

## 2023-10-18 PROCEDURE — 700102 HCHG RX REV CODE 250 W/ 637 OVERRIDE(OP)

## 2023-10-18 PROCEDURE — 97530 THERAPEUTIC ACTIVITIES: CPT

## 2023-10-18 PROCEDURE — 3E02340 INTRODUCTION OF INFLUENZA VACCINE INTO MUSCLE, PERCUTANEOUS APPROACH: ICD-10-PCS | Performed by: HOSPITALIST

## 2023-10-18 PROCEDURE — 99232 SBSQ HOSP IP/OBS MODERATE 35: CPT | Performed by: HOSPITALIST

## 2023-10-18 PROCEDURE — 700111 HCHG RX REV CODE 636 W/ 250 OVERRIDE (IP): Performed by: INTERNAL MEDICINE

## 2023-10-18 PROCEDURE — 36415 COLL VENOUS BLD VENIPUNCTURE: CPT

## 2023-10-18 PROCEDURE — A9270 NON-COVERED ITEM OR SERVICE: HCPCS

## 2023-10-18 PROCEDURE — 97535 SELF CARE MNGMENT TRAINING: CPT

## 2023-10-18 PROCEDURE — 700102 HCHG RX REV CODE 250 W/ 637 OVERRIDE(OP): Performed by: INTERNAL MEDICINE

## 2023-10-18 PROCEDURE — 84145 PROCALCITONIN (PCT): CPT

## 2023-10-18 PROCEDURE — 700111 HCHG RX REV CODE 636 W/ 250 OVERRIDE (IP): Performed by: HOSPITALIST

## 2023-10-18 PROCEDURE — 770006 HCHG ROOM/CARE - MED/SURG/GYN SEMI*

## 2023-10-18 PROCEDURE — A9270 NON-COVERED ITEM OR SERVICE: HCPCS | Performed by: INTERNAL MEDICINE

## 2023-10-18 PROCEDURE — 71045 X-RAY EXAM CHEST 1 VIEW: CPT

## 2023-10-18 PROCEDURE — C9803 HOPD COVID-19 SPEC COLLECT: HCPCS | Performed by: HOSPITALIST

## 2023-10-18 PROCEDURE — 97112 NEUROMUSCULAR REEDUCATION: CPT

## 2023-10-18 PROCEDURE — 90662 IIV NO PRSV INCREASED AG IM: CPT | Performed by: HOSPITALIST

## 2023-10-18 RX ORDER — GUAIFENESIN/DEXTROMETHORPHAN 100-10MG/5
5 SYRUP ORAL EVERY 6 HOURS PRN
Status: DISCONTINUED | OUTPATIENT
Start: 2023-10-18 | End: 2023-10-20 | Stop reason: HOSPADM

## 2023-10-18 RX ADMIN — NYSTATIN 500000 UNITS: 100000 SUSPENSION ORAL at 12:26

## 2023-10-18 RX ADMIN — NYSTATIN 500000 UNITS: 100000 SUSPENSION ORAL at 21:03

## 2023-10-18 RX ADMIN — VALPROIC ACID 250 MG: 250 CAPSULE, LIQUID FILLED ORAL at 13:39

## 2023-10-18 RX ADMIN — FUROSEMIDE 20 MG: 10 INJECTION, SOLUTION INTRAVENOUS at 15:31

## 2023-10-18 RX ADMIN — FUROSEMIDE 20 MG: 10 INJECTION, SOLUTION INTRAVENOUS at 06:13

## 2023-10-18 RX ADMIN — ENOXAPARIN SODIUM 40 MG: 100 INJECTION SUBCUTANEOUS at 17:42

## 2023-10-18 RX ADMIN — VALPROIC ACID 250 MG: 250 CAPSULE, LIQUID FILLED ORAL at 06:13

## 2023-10-18 RX ADMIN — Medication 100 MG: at 06:13

## 2023-10-18 RX ADMIN — VALPROIC ACID 250 MG: 250 CAPSULE, LIQUID FILLED ORAL at 21:03

## 2023-10-18 RX ADMIN — INFLUENZA A VIRUS A/VICTORIA/4897/2022 IVR-238 (H1N1) ANTIGEN (FORMALDEHYDE INACTIVATED), INFLUENZA A VIRUS A/DARWIN/9/2021 SAN-010 (H3N2) ANTIGEN (FORMALDEHYDE INACTIVATED), INFLUENZA B VIRUS B/PHUKET/3073/2013 ANTIGEN (FORMALDEHYDE INACTIVATED), AND INFLUENZA B VIRUS B/MICHIGAN/01/2021 ANTIGEN (FORMALDEHYDE INACTIVATED) 0.7 ML: 60; 60; 60; 60 INJECTION, SUSPENSION INTRAMUSCULAR at 09:05

## 2023-10-18 RX ADMIN — CLOBETASOL PROPIONATE CREAM USP, 0.05%: 0.5 CREAM TOPICAL at 06:12

## 2023-10-18 RX ADMIN — NYSTATIN 500000 UNITS: 100000 SUSPENSION ORAL at 09:05

## 2023-10-18 RX ADMIN — FOLIC ACID 1 MG: 1 TABLET ORAL at 06:13

## 2023-10-18 RX ADMIN — NYSTATIN 500000 UNITS: 100000 SUSPENSION ORAL at 17:43

## 2023-10-18 RX ADMIN — POTASSIUM BICARBONATE 50 MEQ: 978 TABLET, EFFERVESCENT ORAL at 06:13

## 2023-10-18 RX ADMIN — LOSARTAN POTASSIUM 50 MG: 50 TABLET, FILM COATED ORAL at 06:12

## 2023-10-18 RX ADMIN — ROSUVASTATIN CALCIUM 20 MG: 20 TABLET, FILM COATED ORAL at 17:43

## 2023-10-18 RX ADMIN — CLOBETASOL PROPIONATE CREAM USP, 0.05%: 0.5 CREAM TOPICAL at 17:42

## 2023-10-18 RX ADMIN — POTASSIUM BICARBONATE 50 MEQ: 978 TABLET, EFFERVESCENT ORAL at 17:43

## 2023-10-18 RX ADMIN — THERA TABS 1 TABLET: TAB at 06:12

## 2023-10-18 RX ADMIN — DULOXETINE HYDROCHLORIDE 30 MG: 30 CAPSULE, DELAYED RELEASE ORAL at 06:12

## 2023-10-18 ASSESSMENT — ENCOUNTER SYMPTOMS
ABDOMINAL PAIN: 0
VOMITING: 0
SHORTNESS OF BREATH: 0
BLURRED VISION: 0
PALPITATIONS: 0
FEVER: 0
DOUBLE VISION: 0
SPUTUM PRODUCTION: 0
CHILLS: 0
HEADACHES: 0
WEAKNESS: 1
LOSS OF CONSCIOUSNESS: 0
NAUSEA: 0
BACK PAIN: 0
DIZZINESS: 0
DIARRHEA: 0
COUGH: 1
SORE THROAT: 0

## 2023-10-18 ASSESSMENT — COGNITIVE AND FUNCTIONAL STATUS - GENERAL
MOBILITY SCORE: 8
TOILETING: A LOT
DAILY ACTIVITIY SCORE: 12
STANDING UP FROM CHAIR USING ARMS: A LOT
CLIMB 3 TO 5 STEPS WITH RAILING: TOTAL
MOVING TO AND FROM BED TO CHAIR: UNABLE
WALKING IN HOSPITAL ROOM: A LOT
SUGGESTED CMS G CODE MODIFIER DAILY ACTIVITY: CL
TURNING FROM BACK TO SIDE WHILE IN FLAT BAD: UNABLE
DRESSING REGULAR UPPER BODY CLOTHING: A LOT
DRESSING REGULAR LOWER BODY CLOTHING: A LOT
HELP NEEDED FOR BATHING: A LOT
EATING MEALS: A LOT
SUGGESTED CMS G CODE MODIFIER MOBILITY: CM
PERSONAL GROOMING: A LOT
MOVING FROM LYING ON BACK TO SITTING ON SIDE OF FLAT BED: UNABLE

## 2023-10-18 ASSESSMENT — PAIN DESCRIPTION - PAIN TYPE
TYPE: ACUTE PAIN

## 2023-10-18 ASSESSMENT — GAIT ASSESSMENTS
ASSISTIVE DEVICE: FRONT WHEEL WALKER
GAIT LEVEL OF ASSIST: MODERATE ASSIST
DISTANCE (FEET): 5
DEVIATION: BRADYKINETIC;SHUFFLED GAIT

## 2023-10-18 NOTE — PROGRESS NOTES
Hospital Medicine Daily Progress Note    Date of Service  10/18/2023    Chief Complaint  Balwinder Grimes is a 72 y.o. male admitted 10/4/2023 with altered mental status    Hospital Course  73yo history of alcohol abuse, hypertension, dyslipidemia presenting after syncopal event at home.  He had recently been in an inpatient alcohol detox program, but was found to be intoxicated on presentation.  In-house he underwent treatment for withdrawal with CIWA protocol at 1 point he was found to be hypoxic likely from Ativan, admitted to the ICU, there is a question of aspiration at that time.  Chest x-ray showed bilateral infiltrates and he was transferred to the ICU where he was intubated on October 9.  He went on to be extubated on October 10 and then came back to the floor with significant confusion    Interval Problem Update    Weakness persist    Significant nonproductive cough this a.m.    I ordered and reviewed an x-ray the chest did not show acute abnormality.    I ordered flu RSV and COVID testing which were all negative      Pt states he feels weak, but definitely is feeling better than he has over the last few days.    I have discussed this patient's plan of care and discharge plan at IDT rounds today with Case Management, Nursing, Nursing leadership, and other members of the IDT team.    Consultants/Specialty      Code Status  Full Code    Disposition  The patient is not medically cleared for discharge to home or a post-acute facility.  Anticipate discharge to: skilled nursing facility    I have placed the appropriate orders for post-discharge needs.    Review of Systems  Review of Systems   Constitutional:  Negative for chills and fever.   HENT:  Negative for nosebleeds and sore throat.    Eyes:  Negative for blurred vision and double vision.   Respiratory:  Positive for cough. Negative for sputum production and shortness of breath.    Cardiovascular:  Negative for chest pain, palpitations and leg swelling.    Gastrointestinal:  Negative for abdominal pain, diarrhea, nausea and vomiting.   Genitourinary:  Negative for dysuria and urgency.   Musculoskeletal:  Negative for back pain.   Skin:  Negative for rash.   Neurological:  Positive for weakness. Negative for dizziness, loss of consciousness and headaches.        Physical Exam  Temp:  [36.2 °C (97.1 °F)-36.7 °C (98 °F)] 36.2 °C (97.1 °F)  Pulse:  [61-78] 61  Resp:  [18] 18  BP: ()/(56-98) 139/98  SpO2:  [90 %-97 %] 97 %    Physical Exam  Constitutional:       General: He is not in acute distress.     Appearance: He is well-developed. He is not diaphoretic.   HENT:      Head: Normocephalic and atraumatic.   Eyes:      Conjunctiva/sclera: Conjunctivae normal.   Neck:      Vascular: No JVD.   Cardiovascular:      Rate and Rhythm: Normal rate.      Heart sounds: No murmur heard.     No gallop.   Pulmonary:      Effort: Pulmonary effort is normal. No respiratory distress.      Breath sounds: No stridor. Rhonchi present. No wheezing or rales.   Abdominal:      Palpations: Abdomen is soft.      Tenderness: There is no abdominal tenderness. There is no guarding or rebound.   Musculoskeletal:         General: No tenderness.      Right lower leg: No edema.      Left lower leg: No edema.   Skin:     General: Skin is warm and dry.      Findings: No rash.   Neurological:      Mental Status: He is oriented to person, place, and time.   Psychiatric:         Mood and Affect: Mood normal.         Behavior: Behavior normal.         Thought Content: Thought content normal.         Fluids    Intake/Output Summary (Last 24 hours) at 10/18/2023 1308  Last data filed at 10/18/2023 1200  Gross per 24 hour   Intake 840 ml   Output 1100 ml   Net -260 ml         Laboratory  Recent Labs     10/16/23  0400 10/17/23  0124   WBC 8.7 8.2   RBC 3.85* 3.95*   HEMOGLOBIN 13.2* 13.9*   HEMATOCRIT 39.7* 41.4*   .1* 104.8*   MCH 34.3* 35.2*   MCHC 33.2 33.6   RDW 44.0 44.3   PLATELETCT 309  286   MPV 9.9 10.1       Recent Labs     10/16/23  0400 10/17/23  0124   SODIUM 144 143   POTASSIUM 4.2 4.2   CHLORIDE 103 102   CO2 36* 34*   GLUCOSE 139* 103*   BUN 15 16   CREATININE 0.65 0.64   CALCIUM 8.8 9.2                     Imaging  DX-CHEST-PORTABLE (1 VIEW)   Final Result      1.  Patchy RIGHT midlung opacity peripherally could be early pneumonia   2.  Mild interstitial pulmonary edema      DX-ESOPHAGUS - [id number]   Final Result      DX-ABDOMEN FOR TUBE PLACEMENT   Final Result         1.  Nonspecific bowel gas pattern in the upper abdomen.   2.  Nasogastric tube is coiled within the stomach, the tip terminates overlying the expected location of the gastric cardia.   3.  Cardiomegaly      DX-ABDOMEN FOR TUBE PLACEMENT   Final Result         1.  Nonspecific bowel gas pattern in the upper abdomen.   2.  Nasogastric tube terminating just distal to the gastroesophageal junction, recommend advancement.   3.  Hazy bilateral pulmonary edema and/or infiltrates   4.  Cardiomegaly      DX-ABDOMEN FOR TUBE PLACEMENT   Final Result      1.  Enteric tube overlies the gastric body.      DX-ABDOMEN FOR TUBE PLACEMENT   Final Result      1.  NG tube side port projects at the gastroesophageal junction. Recommend advancing before use.   2.  Bilateral pleural effusions with adjacent airspace disease.      IR-MIDLINE CATHETER INSERTION WO GUIDANCE > AGE 3   Final Result                  Ultrasound-guided midline placement performed by qualified nursing staff    as above.          DX-CHEST-PORTABLE (1 VIEW)   Final Result      RIGHT larger than LEFT pleural effusion with overlying atelectasis and/or airspace disease and suspected superimposed pulmonary edema, increased since prior      EC-ECHOCARDIOGRAM COMPLETE W/O CONT   Final Result      DX-CHEST-PORTABLE (1 VIEW)   Final Result      RIGHT larger than LEFT pleural effusion with overlying atelectasis and/or airspace disease and suspected superimposed pulmonary edema,  unchanged      DX-ABDOMEN FOR TUBE PLACEMENT   Final Result         Gastric drainage tube with tip projecting over the expected area of the stomach.      DX-ABDOMEN FOR TUBE PLACEMENT   Final Result         Gastric drainage tube coiled in the stomach with tip projecting over the expected area of the distal stomach.      DX-CHEST-PORTABLE (1 VIEW)   Final Result      1.  Likely layering bilateral pleural effusions.   2.  Interstitial infiltrates and/or edema.   3.  Support apparatus as above.      DX-CHEST-PORTABLE (1 VIEW)   Final Result      1.  Pulmonary edema. Multifocal pneumonia could have a similar appearance.   2.  Likely small/moderate bilateral pleural effusions.      DX-CHEST-PORTABLE (1 VIEW)   Final Result      No acute cardiopulmonary abnormality.      CT-CSPINE WITHOUT PLUS RECONS   Final Result      No acute fracture or dislocation of the cervical spine.      CT-HEAD W/O   Final Result      1.  Cerebral atrophy.      2.  White matter lucencies most consistent with small vessel ischemic change versus demyelination or gliosis.      3.  Otherwise, Head CT without contrast with no acute findings. No evidence of acute cerebral infarction, hemorrhage or mass lesion.              Assessment/Plan  * Respiratory failure requiring intubation (HCC)- (present on admission)  Assessment & Plan  Secondary to aspiration pneumonia and altered sensorium from alcohol withdrawal  Intubated October 9, subsequently extubated October 10  Currently on nasal cannula O2  Continue O2 and RT protocols  Mobilize  I-S      Oropharyngeal dysphagia  Assessment & Plan  Now on a modified diet per speech therapy  Continue to monitor for signs of aspiration    Urinary tract infection- (present on admission)  Assessment & Plan  Febrile with flank pain  Urine Cx (10/8) - E. Faecalis, sensitive to ampicillin, nitrofurantoin, penicillin  Completed antibiotics    Aspiration pneumonitis (HCC)- (present on admission)  Assessment &  Plan  Secondary to alcohol withdrawal syndrome  Blood cultures have been negative as has the BAL  S/p completed course of antibiotics  Speech therapy has evaluated and he is on modified diet    Fever- (present on admission)  Assessment & Plan  Treated for aspiration pneumonia and Enterococcus faecalis UTI  Observe off antibiotics    Conjunctivitis of both eyes- (present on admission)  Assessment & Plan  Completed topical therapy    Elevated troponin- (present on admission)  Assessment & Plan  Secondary to demand ischemia    Macrocytic anemia- (present on admission)  Assessment & Plan  Pt with hemoglobin of 12.7 and MCV at 104.5 on admission; Likely due to heavy alcohol use.   - Continue to monitor CBC    - Continue multivitamin and thiamine supplement and folate    Thrombocytopenia (HCC)- (present on admission)  Assessment & Plan  Now in the normal range    Essential tremor- (present on admission)  Assessment & Plan  Monitor    Hypokalemia- (present on admission)  Assessment & Plan  Replete and monitor closely    Alcohol withdrawal syndrome, with delirium (HCC)- (present on admission)  Assessment & Plan  Admitted on 10/6, reported drinks 1-2 bottles of wine  Initially admitted on a CIWA protocol, subsequently went to the ICU and required intubation but now is through his DTs.      Syncope and collapse- (present on admission)  Assessment & Plan  Admitted for syncope 10/4, likely alcohol related  Echo (10/10/23): EF 55%, no valvular abnormalities  Telemetry and EKG have been unremarkable in this regard  Patient has not had any further symptoms    Neuropathy- (present on admission)  Assessment & Plan  Continue Cymbalta    Hyperlipidemia- (present on admission)  Assessment & Plan  Continue rosuvastatin    Benign prostatic hyperplasia with urinary retention- (present on admission)  Assessment & Plan  Continue doxazosin    Primary hypertension- (present on admission)  Assessment & Plan  Continue doxazosin, losartan as  tolerated  Diuresis monitor         VTE prophylaxis:   SCDs/TEDs      I have performed a physical exam and reviewed and updated ROS and Plan today (10/18/2023). In review of yesterday's note (10/17/2023), there are no changes except as documented above.

## 2023-10-18 NOTE — THERAPY
"Speech Language Pathology   Daily Treatment     Patient Name: Balwinder Grimes  AGE:  72 y.o., SEX:  male  Medical Record #: [id number]  Date of Service: 10/18/2023      Precautions:  Precautions: Fall Risk, Swallow Precautions         Subjective  Patient cleared by RN for session. Pt received awake and agreeable to participate in dysphagia tx. Pt did not have recollection of previous session. When asked of he remembered working on swallowing exercises pt replied, \"yes we worked on rowing high, rowing low, rowing fast.\" Pt reported intermittent cough; however, none was noted with PO intake.      Assessment  Patient seen for dysphagia management with focus on swallowing exercises. Pt completed the following exercises with \"good\" accuracy given mod verbal/tactile cues; effortful swallow ~15x (dry swallow and w/ ice chips), Analia ~x10, and BoT exercise ~x10. Pt demonstrated good effort; however, has poor recollection of previous sessions and exercises.       Clinical Impressions  Patient presents with moderate-severe oropharyngeal dysphagia per MBSS completed 10/16. Pt appears to be a fair candidate for ongoing behavioral and exercise-based swallow rehabilitation. Dysphagia outcomes can be maximized with use of mobility as pt is able and frequent, thorough oral care.          Recommendations  Treatment Completed: Dysphagia Treatment       Dysphagia Treatment  Diet Consistency: Regular solids with thin liquids  Instrumentation: None indicated at this time  Medication: Crush with pudding/puree, as appropriate, Crush with applesauce, as appropriate  Supervision: 1:1 feeding with constant supervision, Encourage self-feeding  Positioning: Fully upright and midline during oral intake, Meals sitting upright in a chair, as tolerated, Remain upright for 30-45 minutes after oral intake  Risk Management : Small bites/sips, Slow rate of intake, Alternate bites and sips, Physical mobility, as tolerated (swallow 2-3x per " "bite)  Oral Care: BID                     SLP Treatment Plan  Treatment Plan: Dysphagia Treatment, Patient/Family/Caregiver Training  SLP Frequency: 3x Per Week  Estimated Duration: N/A - Evaluation Only      Anticipated Discharge Needs  Discharge Recommendations: Recommend post-acute placement for additional speech therapy services prior to discharge home  Therapy Recommendations Upon DC: Dysphagia Training, Patient / Family / Caregiver Education      Patient / Family Goals  Patient / Family Goal #1: \"water\"  Goal #1 Outcome: Progressing as expected  Short Term Goals  Short Term Goal # 1: Patient will participate in pre-feeding trials with SLP only with no overt s/sx of aspiration  Goal Outcome # 1: Goal met, new goal added  Short Term Goal # 1 B : Patient will participate in instrumental swallow study to objectively assess swallow function and informed POC  Goal Outcome  # 1 B: Goal not met  Short Term Goal # 2: Pt will complete 20 reps each of swallow exercises targeting pharyngeal shortening, laryngeal vestibule closure and base of tongue retraction with \"good\" acuracy  Goal Outcome # 2 : Progressing as expected  Short Term Goal # 3: Pt will consume diet of RG/TN given 1:1 spv and strategy use with no overt s/sx of aspiration or worsening of lung status.  Goal Outcome  # 3: Progressing as expected      Maria Ines Chavez MS,CCC-SLP    "

## 2023-10-18 NOTE — PROGRESS NOTES
Report called to MILTON Ambriz on [address]. Pt's belongings and medications bagged up and placed on his bed. Pt to transport via bed. I also called pt's wife, Alma and informed her of pt's room change.

## 2023-10-18 NOTE — THERAPY
"Occupational Therapy  Daily Treatment     Patient Name: Balwinder Grimes  Age:  72 y.o., Sex:  male  Medical Record #: [id number]  Today's Date: 10/18/2023       Precautions: Fall Risk, Swallow Precautions  Comments: confused    Assessment  Pt was seen for OT tx, pt txf to ICU 10/9 and required intubation for which therapy orders were cancelled, pt is now re-ordered and OT services have been resumed. Pt remains w/significant weakness and debility as well as confusion. Pt is able to perform seated UB ADL's w/set up and increased time d/t tremors. Balance, and weakness are impacting pts ability to perform LB, standing and functional mobility tasks. Recommend post acute placement.     Plan  Treatment Plan Status: Modify Current Treatment Plan  Type of Treatment: Self Care / Activities of Daily Living, Adaptive Equipment, Cognitive Skill Development, Neuro Re-Education / Balance, Therapeutic Exercises, Therapeutic Activity  Treatment Frequency: 4 Times per Week  Treatment Duration: Until Therapy Goals Met    DC Equipment Recommendations: Unable to determine at this time  Discharge Recommendations: Recommend post-acute placement for additional occupational therapy services prior to discharge home    Subjective  \"I just can't stay here for long\"      Objective       10/18/23 1001   Treatment Charges   Charges Yes   OT Self Care / ADL (Units) 2   Total Time Spent   OT Time Spent Yes   OT Self Care / ADL (Minutes) 25   OT Total Time Spent (Calculated) 25    Services   Is patient using  services for this encounter? No   Precautions   Precautions Fall Risk;Swallow Precautions   Comments confused   Pain 0 - 10 Group   Therapist Pain Assessment 0;Nurse Notified   Cognition    Cognition / Consciousness X   Orientation Level Not Oriented to Month;Not Oriented to Day   Level of Consciousness Alert   Ability To Follow Commands 1 Step   Safety Awareness Impaired;Impulsive   New Learning Impaired   Attention " "Impaired   Comments Cooperative but confused AOx1 limited insight into deficts   Passive ROM Upper Body   Passive ROM Upper Body WDL   Active ROM Upper Body   Active ROM Upper Body  X   Comments remains limited in BUE d/t weaknes   Strength Upper Body   Upper Body Strength  X   Gross Strength Generalized Weakness, Equal Bilaterally.    Other Treatments   Other Treatments Provided Focused on sitting balance and reaching for ADL items improved tremors but still mildly present   Balance   Sitting Balance (Static) Fair -   Sitting Balance (Dynamic) Poor +   Standing Balance (Static) Poor   Standing Balance (Dynamic) Poor   Weight Shift Sitting Fair   Weight Shift Standing Poor   Skilled Intervention Verbal Cuing;Postural Facilitation;Facilitation   Comments w/fww   Bed Mobility    Comments up w/PT and remained in chair   Activities of Daily Living   Eating Minimal Assist   Grooming Minimal Assist;Seated   Upper Body Dressing Moderate Assist   Lower Body Dressing Maximal Assist   Toileting Maximal Assist   Skilled Intervention Verbal Cuing;Tactile Cuing;Facilitation;Compensatory Strategies   How much help from another person does the patient currently need...   6 Clicks Daily Activity Score 12   Functional Mobility   Sit to Stand Moderate Assist   Bed, Chair, Wheelchair Transfer Moderate Assist   Mobility standing from chair   Activity Tolerance   Comments c/o fatigue   Patient / Family Goals   Patient / Family Goal #1 \"Go home\"   Goal #1 Outcome Goal not met   Short Term Goals   Short Term Goal # 1 Pt will complete ADL transfers with supv   Goal Outcome # 1 Goal not met   Short Term Goal # 2 Pt will complete toileting with SBA   Goal Outcome # 2 Goal not met   Short Term Goal # 3 Pt will complete LB dressing with SBA   Goal Outcome # 3 Goal not met   Occupational Therapy Treatment Plan    O.T. Treatment Plan Modify Current Treatment Plan   Treatment Frequency 4 Times per Week   Anticipated Discharge Equipment and " Recommendations   DC Equipment Recommendations Unable to determine at this time   Discharge Recommendations Recommend post-acute placement for additional occupational therapy services prior to discharge home   Interdisciplinary Plan of Care Collaboration   IDT Collaboration with  Nursing;Physical Therapist   Patient Position at End of Therapy Seated;Chair Alarm On;Call Light within Reach;Tray Table within Reach;Phone within Reach   Collaboration Comments RN aware of session   Session Information   Date / Session Number  10/18 #2 (1/4, 10/19)

## 2023-10-18 NOTE — THERAPY
"Physical Therapy   Daily Treatment     Patient Name: Balwinder Grimes  Age:  72 y.o., Sex:  male  Medical Record #: [id number]  Today's Date: 10/18/2023     Precautions  Precautions: Fall Risk;Swallow Precautions    Assessment    Patient seen for follow up PT treatment session and demos improved alertness and participation. He needs ModA to get to EOB and demos correction for mid-line balance with cueing and visual cues.  Patient demos B LE weakness most likely 2/2 prolonged bedrest and immobility while in the hospital.  He is motivated to work with therapy and follows commands but still has some confusion.   He was able to take side steps with FWW to the chair and ModA with max fatigue.  He will benefit from placement for further therapy at this time. Will continue to follow. He should be up to the chair for all meals.     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\"     Objective       10/18/23 0930   Precautions   Precautions Fall Risk;Swallow Precautions   Pain 0 - 10 Group   Therapist Pain Assessment Post Activity Pain Same as Prior to Activity;Nurse Notified;0   Cognition    Cognition / Consciousness X   Orientation Level Not Oriented to Month;Not Oriented to Day   Level of Consciousness Alert   Ability To Follow Commands 1 Step   Safety Awareness Impaired;Impulsive   New Learning Impaired   Attention Impaired   Comments improved insight into deficits   Passive ROM Lower Body   Passive ROM Lower Body WDL "   Strength Lower Body   Lower Body Strength  X   Comments gross B LE weakness 3/5   Sensation Lower Body   Comments baseline PN in B LE dital to the knees   Sitting Lower Body Exercises   Sitting Lower Body Exercises Yes   Ankle Pumps 1 set of 10   Long Arc Quad 1 set of 10   Standing Lower Body Exercises   Standing Lower Body Exercises Yes   Marching 1 set of 10   Neuro-Muscular Treatments   Neuro-Muscular Treatments Anterior weight shift;Compensatory Strategies;Verbal Cuing;Weight Shift Right;Weight Shift Left;Tactile Cuing   Comments cueing for finding midline   Other Treatments   Other Treatments Provided Educated about the pathologies of bedrest   Balance   Sitting Balance (Static) Fair -   Sitting Balance (Dynamic) Poor +   Standing Balance (Static) Poor   Standing Balance (Dynamic) Poor -   Weight Shift Sitting Fair   Weight Shift Standing Poor   Skilled Intervention Postural Facilitation;Sequencing;Tactile Cuing;Verbal Cuing   Comments w/FWW   Bed Mobility    Supine to Sit Moderate Assist   Scooting Supervised   Skilled Intervention Compensatory Strategies;Sequencing;Tactile Cuing;Verbal Cuing   Comments w/HOB elevated   Gait Analysis   Gait Level Of Assist Moderate Assist   Assistive Device Front Wheel Walker   Distance (Feet) 5   # of Times Distance was Traveled 1   Deviation Bradykinetic;Shuffled Gait   Skilled Intervention Tactile Cuing;Postural Facilitation;Sequencing;Verbal Cuing   Comments side steps at EOB to the chair   Functional Mobility   Sit to Stand Moderate Assist   Bed, Chair, Wheelchair Transfer Moderate Assist   Transfer Method Stand Step   Skilled Intervention Compensatory Strategies;Sequencing;Tactile Cuing;Verbal Cuing   ICU Target Mobility Level   ICU Mobility - Targeted Level Level 3B   How much difficulty does the patient currently have...   Turning over in bed (including adjusting bedclothes, sheets and blankets)? 1   Sitting down on and standing up from a chair with arms (e.g.,  wheelchair, bedside commode, etc.) 1   Moving from lying on back to sitting on the side of the bed? 1   How much help from another person does the patient currently need...   Moving to and from a bed to a chair (including a wheelchair)? 2   Need to walk in a hospital room? 2   Climbing 3-5 steps with a railing? 1   6 clicks Mobility Score 8   Activity Tolerance   Sitting in Chair post-session   Sitting Edge of Bed 10 min   Standing 5 min   Comments poor endurance   Short Term Goals    Short Term Goal # 1 Pt will perform STS/functional transfers with FWW and SPV in 6 visits to progress bed mobility   Goal Outcome # 1 Progressing slower than expected   Short Term Goal # 2 Pt will ambualte at least 200 ft with FWW and SPV in 6 visits to progress functional independence   Goal Outcome # 2 Goal not met   Short Term Goal # 3 Pt will negotiate 2 stairs with SPV in 6 visits to safely access home   Goal Outcome # 3 Goal not met   Physical Therapy Treatment Plan   Physical Therapy Treatment Plan Continue Current Treatment Plan   Anticipated Discharge Equipment and Recommendations   DC Equipment Recommendations Unable to determine at this time   Discharge Recommendations Recommend post-acute placement for additional physical therapy services prior to discharge home     Brianne Cooper, PT, DPT, GCS

## 2023-10-18 NOTE — PROGRESS NOTES
4 Eyes Skin Assessment Completed by MILTON Ambriz and MILTON David.    Head eczema  Ears WDL  Nose WDL  Mouth thrush  Neck WDL  Breast/Chest WDL  Shoulder Blades eczema  Spine eczema  (R) Arm/Elbow/Hand Bruising eczema  (L) Arm/Elbow/Hand Bruising eczema  Abdomen WDL  Groin WDL  Scrotum/Coccyx/Buttocks Redness and Blanching  (R) Leg Bruising eczema  (L) Leg Bruising eczema  (R) Heel/Foot/Toe Blanching and Boggy eczema  (L) Heel/Foot/Toe Blanching and Boggy eczema          Devices In Places Maza and Nasal Cannula      Interventions In Place NC W/Ear Foams, Pillows, Q2 Turns, Barrier Cream, and Heels Loaded W/Pillows    Possible Skin Injury No    Pictures Uploaded Into Epic N/A  Wound Consult Placed N/A  RN Wound Prevention Protocol Ordered No

## 2023-10-18 NOTE — DISCHARGE PLANNING
"Case Management Discharge Planning    Admission Date: 10/4/2023  GMLOS: 5  ALOS: 14    6-Clicks ADL Score: 14  6-Clicks Mobility Score: 6  PT and/or OT Eval ordered: Yes  Post-acute Referrals Ordered: No  Post-acute Choice Obtained: No - Pending Pt's Harvinder Tejada choice.  Has referral(s) been sent to post-acute provider:  No      Anticipated Discharge Dispo: Discharge Disposition: D/T to SNF with Medicare cert in anticipation of skilled care (03)    RN ELIAS called pts sonTerrell, [phone] to inform acceptance to SNF's and requested callback from him to allow discussion on choice.    Per Dr. Parker notes as of now,\"the patient is not medically cleared for discharge to home or a post-acute facility.\"    1400, RN ELIAS called Pts Terrell nicolas, [phone] but Terrell said he will just call back. Call back number provided.    1500, RN ELIAS BINGHAM, received a callback from Terrell. [email or web address]    0801, Olivia ZIMMERMAN was provided patient updates.      DME Needed: No    Action(s) Taken: Updated Provider/Nurse on Discharge Plan    Escalations Completed: None    Medically Clear: No    Next Steps: CM will continue to assist Pt with discharge     Barriers to Discharge: Medical clearance and Pending Placement    Is the patient up for discharge tomorrow: No        "

## 2023-10-18 NOTE — CARE PLAN
The patient is Stable - Low risk of patient condition declining or worsening    Shift Goals  Clinical Goals: safety, comfort  Patient Goals: rest  Family Goals: MAXX    Progress made toward(s) clinical / shift goals:  Pt remains safe from  falls and injury. Bed alarm on, bed in low position, bedside commitment in place. Pt appears comfortable and able to rest.    Patient is not progressing towards the following goals:      Problem: Lifestyle Changes  Goal: Patient's ability to identify lifestyle changes and available resources to help reduce recurrence of condition will improve  Description: Target End Date:  1 to 3 days    1.  Discuss recommended lifestyle changes  2.  Identify available resources and support systems  3.  Consider referral to substance abuse program  Outcome: Not Progressing     Problem: Psychosocial  Goal: Patient's level of anxiety will decrease  Description: Target End Date:  1-3 days or as soon as patient condition allows    1.  Collaborate with patient and family/caregiver to identify triggers and develop strategies to cope with anxiety  2.  Implement stimuli reduction, calming techniques  3.  Pharmacologic management per provider order  4.  Encourage patient/family/care giver participation  5.  Collaborate with interdisciplinary team including Psychologist or Behavioral Health Team as needed  Outcome: Not Progressing     Problem: Knowledge Deficit - Standard  Goal: Patient and family/care givers will demonstrate understanding of plan of care, disease process/condition, diagnostic tests and medications  Description: Target End Date:  1-3 days or as soon as patient condition allows    Document in Patient Education    1.  Patient and family/caregiver oriented to unit, equipment, visitation policy and means for communicating concern  2.  Complete/review Learning Assessment  3.  Assess knowledge level of disease process/condition, treatment plan, diagnostic tests and medications  4.  Explain  disease process/condition, treatment plan, diagnostic tests and medications  Outcome: Not Progressing

## 2023-10-18 NOTE — PROGRESS NOTES
Assumed pt care with RN. Pt transferred to unit on 2L O2 nasal canula on ICU low airloss bed and was assisted into the unit bed with X2 pivot. Pt was very unsteady when he tried to stand and was tilting backwards. Maza catheter in place. Pt is A&OX2 (disoriented to time and situation) and states he is in 0/10 pain but declines interventions at this time. Plan of care discussed, no further questions. 2 RN skin check completed. Personal belongings and call light within reach, bed alarm on.

## 2023-10-18 NOTE — PROGRESS NOTES
Received report, assumed pt care. Pt a&o x 3, pt disoriented to time, VSS, Assessment completed. Maza catheter in place, draining yellow urine. Generalized skin dryness noted, scattered scabs on BLE and BUE. Resting comfortably in bed with call light, bedside table in reach. No c/o at this time. Side rails up x 2. Instructed to use call light when needing to get OOB verbalized understanding. Bed alarm on, bed in low position. Will continue to monitor.

## 2023-10-18 NOTE — CARE PLAN
The patient is Stable - Low risk of patient condition declining or worsening    Shift Goals  Clinical Goals: safety, comfort  Patient Goals: rest  Family Goals: MAXX    Progress made toward(s) clinical / shift goals:  Fall risk education was provided to the patient with proper interventions in place, the patient was checked on hourly, and he effectively called for assistance when needed which kept him safe and free from falls. Patient's pain levels were assessed every four hours and PRN with proper interventions put in place which allowed him to reach his comfort goal of 0/sleeping comfortably throughout the shift. The patient was turned every two hours, cleaned after incontinence episodes, and had his skin checked which allowed for optimal skin integrity.      Problem: Fall Risk  Goal: Patient will remain free from falls  Outcome: Progressing     Problem: Knowledge Deficit - Standard  Goal: Patient and family/care givers will demonstrate understanding of plan of care, disease process/condition, diagnostic tests and medications  Outcome: Progressing     Problem: Skin Integrity  Goal: Skin integrity is maintained or improved  Outcome: Progressing     Problem: Pain - Standard  Goal: Alleviation of pain or a reduction in pain to the patient’s comfort goal  Outcome: Progressing       Patient is not progressing towards the following goals:

## 2023-10-18 NOTE — DISCHARGE PLANNING
Spoke To: Vivien  Agency/Facility Name: [facility]  Plan or Request: accepted, bed available.    0917- Spoke To: Yandy  Agency/Facility Name: [facility]  Plan or Request: accepted, bed available     Per [facility] link[facility]    5898- Spoke To: Magy  Ag[facility]ility [facility]  Plan or Request: possible bed tomorrow.

## 2023-10-19 LAB
MAGNESIUM SERPL-MCNC: 2.5 MG/DL (ref 1.5–2.5)
PHOSPHATE SERPL-MCNC: 3.5 MG/DL (ref 2.5–4.5)

## 2023-10-19 PROCEDURE — 700102 HCHG RX REV CODE 250 W/ 637 OVERRIDE(OP)

## 2023-10-19 PROCEDURE — 51798 US URINE CAPACITY MEASURE: CPT

## 2023-10-19 PROCEDURE — 770006 HCHG ROOM/CARE - MED/SURG/GYN SEMI*

## 2023-10-19 PROCEDURE — 700102 HCHG RX REV CODE 250 W/ 637 OVERRIDE(OP): Performed by: INTERNAL MEDICINE

## 2023-10-19 PROCEDURE — 99232 SBSQ HOSP IP/OBS MODERATE 35: CPT | Performed by: HOSPITALIST

## 2023-10-19 PROCEDURE — 700102 HCHG RX REV CODE 250 W/ 637 OVERRIDE(OP): Performed by: HOSPITALIST

## 2023-10-19 PROCEDURE — 84100 ASSAY OF PHOSPHORUS: CPT

## 2023-10-19 PROCEDURE — A9270 NON-COVERED ITEM OR SERVICE: HCPCS

## 2023-10-19 PROCEDURE — A9270 NON-COVERED ITEM OR SERVICE: HCPCS | Performed by: INTERNAL MEDICINE

## 2023-10-19 PROCEDURE — A9270 NON-COVERED ITEM OR SERVICE: HCPCS | Performed by: HOSPITALIST

## 2023-10-19 PROCEDURE — 700111 HCHG RX REV CODE 636 W/ 250 OVERRIDE (IP): Performed by: INTERNAL MEDICINE

## 2023-10-19 PROCEDURE — 83735 ASSAY OF MAGNESIUM: CPT

## 2023-10-19 PROCEDURE — 700111 HCHG RX REV CODE 636 W/ 250 OVERRIDE (IP): Mod: JZ | Performed by: STUDENT IN AN ORGANIZED HEALTH CARE EDUCATION/TRAINING PROGRAM

## 2023-10-19 PROCEDURE — 36415 COLL VENOUS BLD VENIPUNCTURE: CPT

## 2023-10-19 RX ORDER — TAMSULOSIN HYDROCHLORIDE 0.4 MG/1
0.4 CAPSULE ORAL NIGHTLY
Status: DISCONTINUED | OUTPATIENT
Start: 2023-10-19 | End: 2023-10-20 | Stop reason: HOSPADM

## 2023-10-19 RX ADMIN — NYSTATIN 500000 UNITS: 100000 SUSPENSION ORAL at 16:13

## 2023-10-19 RX ADMIN — NYSTATIN 500000 UNITS: 100000 SUSPENSION ORAL at 09:07

## 2023-10-19 RX ADMIN — NYSTATIN 500000 UNITS: 100000 SUSPENSION ORAL at 21:36

## 2023-10-19 RX ADMIN — DOCUSATE SODIUM 50 MG AND SENNOSIDES 8.6 MG 2 TABLET: 8.6; 5 TABLET, FILM COATED ORAL at 06:05

## 2023-10-19 RX ADMIN — FUROSEMIDE 20 MG: 10 INJECTION, SOLUTION INTRAVENOUS at 15:52

## 2023-10-19 RX ADMIN — DULOXETINE HYDROCHLORIDE 30 MG: 30 CAPSULE, DELAYED RELEASE ORAL at 06:05

## 2023-10-19 RX ADMIN — NYSTATIN 500000 UNITS: 100000 SUSPENSION ORAL at 12:28

## 2023-10-19 RX ADMIN — FOLIC ACID 1 MG: 1 TABLET ORAL at 06:04

## 2023-10-19 RX ADMIN — CLOBETASOL PROPIONATE CREAM USP, 0.05%: 0.5 CREAM TOPICAL at 06:04

## 2023-10-19 RX ADMIN — VALPROIC ACID 250 MG: 250 CAPSULE, LIQUID FILLED ORAL at 06:03

## 2023-10-19 RX ADMIN — POTASSIUM BICARBONATE 50 MEQ: 978 TABLET, EFFERVESCENT ORAL at 17:40

## 2023-10-19 RX ADMIN — DOCUSATE SODIUM 50 MG AND SENNOSIDES 8.6 MG 2 TABLET: 8.6; 5 TABLET, FILM COATED ORAL at 17:40

## 2023-10-19 RX ADMIN — LOSARTAN POTASSIUM 50 MG: 50 TABLET, FILM COATED ORAL at 06:04

## 2023-10-19 RX ADMIN — ROSUVASTATIN CALCIUM 20 MG: 20 TABLET, FILM COATED ORAL at 17:40

## 2023-10-19 RX ADMIN — CLOBETASOL PROPIONATE CREAM USP, 0.05%: 0.5 CREAM TOPICAL at 17:41

## 2023-10-19 RX ADMIN — TAMSULOSIN HYDROCHLORIDE 0.4 MG: 0.4 CAPSULE ORAL at 21:35

## 2023-10-19 RX ADMIN — Medication 100 MG: at 06:04

## 2023-10-19 RX ADMIN — THERA TABS 1 TABLET: TAB at 06:05

## 2023-10-19 RX ADMIN — VALPROIC ACID 250 MG: 250 CAPSULE, LIQUID FILLED ORAL at 21:35

## 2023-10-19 RX ADMIN — QUETIAPINE FUMARATE 25 MG: 25 TABLET ORAL at 00:29

## 2023-10-19 RX ADMIN — FUROSEMIDE 20 MG: 10 INJECTION, SOLUTION INTRAVENOUS at 06:04

## 2023-10-19 RX ADMIN — VALPROIC ACID 250 MG: 250 CAPSULE, LIQUID FILLED ORAL at 14:18

## 2023-10-19 RX ADMIN — POTASSIUM BICARBONATE 50 MEQ: 978 TABLET, EFFERVESCENT ORAL at 06:04

## 2023-10-19 RX ADMIN — ENOXAPARIN SODIUM 40 MG: 100 INJECTION SUBCUTANEOUS at 17:41

## 2023-10-19 ASSESSMENT — PAIN DESCRIPTION - PAIN TYPE
TYPE: ACUTE PAIN

## 2023-10-19 ASSESSMENT — ENCOUNTER SYMPTOMS
DOUBLE VISION: 0
VOMITING: 0
LOSS OF CONSCIOUSNESS: 0
BLURRED VISION: 0
BACK PAIN: 0
FEVER: 0
HEADACHES: 0
SHORTNESS OF BREATH: 0
DIARRHEA: 0
SPUTUM PRODUCTION: 0
CHILLS: 0
WEAKNESS: 1
DIZZINESS: 0
NAUSEA: 0
COUGH: 1
PALPITATIONS: 0
ABDOMINAL PAIN: 0
SORE THROAT: 0

## 2023-10-19 NOTE — PROGRESS NOTES
Assumed pt care with RN. Pt is A&OX2 (disoriented to time and situation) and states he is in 0/10 pain but declines interventions at this time. Plan of care discussed, no further questions. Maza catheter in place. Personal belongings and call light within reach, bed alarm on.

## 2023-10-19 NOTE — CARE PLAN
The patient is Stable - Low risk of patient condition declining or worsening    Shift Goals  Clinical Goals: eat 50% of meals, safety  Patient Goals: MAXX  Family Goals: MAXX    Progress made toward(s) clinical / shift goals:  Patient remains free from falls. Fall prevention measures including bed alarm in place. Pt more cooperative and redirectable this shift. Maza catheter removed per MD order. Pt stood with assistance from RN with FWW, promoting skin integrity and strength. Pt able to rest comfortably without complaints of pain throughout shift.    Problem: Fall Risk  Goal: Patient will remain free from falls  10/19/2023 1604 by Ami Rodgers R.N.  Outcome: Progressing    Problem: Knowledge Deficit - Standard  Goal: Patient and family/care givers will demonstrate understanding of plan of care, disease process/condition, diagnostic tests and medications  10/19/2023 1604 by Ami Rodgers, R.N.  Outcome: Progressing    Problem: Skin Integrity  Goal: Skin integrity is maintained or improved  10/19/2023 1604 by Ami Rodgers, R.N.  Outcome: Progressing    Problem: Pain - Standard  Goal: Alleviation of pain or a reduction in pain to the patient’s comfort goal  10/19/2023 1604 by Ami Rodgers, R.N.  Outcome: Progressing      Patient is not progressing towards the following goals: Pt did not eat 50% of meals, instead eating around 30-40%. Pt did consume boost and ate more with encouragement.      Problem: Nutrition  Goal: Patient's nutritional and fluid intake will be adequate or improve  Outcome: Not Progressing

## 2023-10-19 NOTE — PROGRESS NOTES
Removed copeland catheter at 1500 per Dr. Holloway order. Will monitor for output and will preform bladder scan if no output is noted within 6 hours.

## 2023-10-19 NOTE — CARE PLAN
The patient is Stable - Low risk of patient condition declining or worsening    Shift Goals  Clinical Goals: safety, eat dinner  Patient Goals: MAXX  Family Goals: MAXX    Progress made toward(s) clinical / shift goals:  Fall risk education was provided to the patient with proper interventions in place, the patient was checked on hourly, and his bed alarm remained on throughout the shift. The patient continued to try to get out of bed and so a lap belt and care aide sitting outside the room were implemented. Food/drink, toileting, and positioning were offered each time he tried getting out of bed. All of these interventions kept the patient safe and free from falls. The patient had a copeland catheter and was assisted to the bedside commode to use the bathroom, as well as repositioned every two hours to maintain skin integrity.      Problem: Fall Risk  Goal: Patient will remain free from falls  Outcome: Progressing     Problem: Knowledge Deficit - Standard  Goal: Patient and family/care givers will demonstrate understanding of plan of care, disease process/condition, diagnostic tests and medications  Outcome: Progressing     Problem: Skin Integrity  Goal: Skin integrity is maintained or improved  Outcome: Progressing       Patient is not progressing towards the following goals:

## 2023-10-19 NOTE — PROGRESS NOTES
Hospital Medicine Daily Progress Note    Date of Service  10/19/2023    Chief Complaint  Balwinder Grimes is a 72 y.o. male admitted 10/4/2023 with altered mental status    Hospital Course  71yo history of alcohol abuse, hypertension, dyslipidemia presenting after syncopal event at home.  He had recently been in an inpatient alcohol detox program, but was found to be intoxicated on presentation.  In-house he underwent treatment for withdrawal with CIWA protocol at 1 point he was found to be hypoxic likely from Ativan, admitted to the ICU, there is a question of aspiration at that time.  Chest x-ray showed bilateral infiltrates and he was transferred to the ICU where he was intubated on October 9.  He went on to be extubated on October 10 and then came back to the floor with significant confusion    Interval Problem Update    Weakness persist    Patient was impulsive this a.m. requiring a lap belt to be placed    Noted increased confusion today and and last night.    Ordered procalcitonin level which is within normal limits    I have discussed this patient's plan of care and discharge plan at IDT rounds today with Case Management, Nursing, Nursing leadership, and other members of the IDT team.    Consultants/Specialty      Code Status  Full Code    Disposition  The patient is not medically cleared for discharge to home or a post-acute facility.  Anticipate discharge to: skilled nursing facility    I have placed the appropriate orders for post-discharge needs.    Review of Systems  Review of Systems   Constitutional:  Negative for chills and fever.   HENT:  Negative for nosebleeds and sore throat.    Eyes:  Negative for blurred vision and double vision.   Respiratory:  Positive for cough. Negative for sputum production and shortness of breath.    Cardiovascular:  Negative for chest pain, palpitations and leg swelling.   Gastrointestinal:  Negative for abdominal pain, diarrhea, nausea and vomiting.   Genitourinary:   Negative for dysuria and urgency.   Musculoskeletal:  Negative for back pain.   Skin:  Negative for rash.   Neurological:  Positive for weakness. Negative for dizziness, loss of consciousness and headaches.        Physical Exam  Temp:  [36.2 °C (97.2 °F)-37.2 °C (98.9 °F)] 36.6 °C (97.9 °F)  Pulse:  [64-76] 71  Resp:  [15-18] 16  BP: (101-132)/(47-82) 132/69  SpO2:  [89 %-98 %] 98 %    Physical Exam  Constitutional:       General: He is not in acute distress.     Appearance: He is well-developed. He is not diaphoretic.   HENT:      Head: Normocephalic and atraumatic.   Eyes:      Conjunctiva/sclera: Conjunctivae normal.   Neck:      Vascular: No JVD.   Cardiovascular:      Rate and Rhythm: Normal rate.      Heart sounds: No murmur heard.     No gallop.   Pulmonary:      Effort: Pulmonary effort is normal. No respiratory distress.      Breath sounds: No stridor. Rhonchi present. No wheezing or rales.   Abdominal:      Palpations: Abdomen is soft.      Tenderness: There is no abdominal tenderness. There is no guarding or rebound.   Musculoskeletal:         General: No tenderness.      Right lower leg: No edema.      Left lower leg: No edema.   Skin:     General: Skin is warm and dry.      Findings: No rash.   Neurological:      Mental Status: He is oriented to person, place, and time.   Psychiatric:         Mood and Affect: Mood normal.         Behavior: Behavior normal.         Thought Content: Thought content normal.         Fluids    Intake/Output Summary (Last 24 hours) at 10/19/2023 1203  Last data filed at 10/19/2023 1100  Gross per 24 hour   Intake 460 ml   Output 1700 ml   Net -1240 ml         Laboratory  Recent Labs     10/17/23  0124   WBC 8.2   RBC 3.95*   HEMOGLOBIN 13.9*   HEMATOCRIT 41.4*   .8*   MCH 35.2*   MCHC 33.6   RDW 44.3   PLATELETCT 286   MPV 10.1       Recent Labs     10/17/23  0124   SODIUM 143   POTASSIUM 4.2   CHLORIDE 102   CO2 34*   GLUCOSE 103*   BUN 16   CREATININE 0.64   CALCIUM  9.2                     Imaging  DX-CHEST-PORTABLE (1 VIEW)   Final Result      1.  Patchy RIGHT midlung opacity peripherally could be early pneumonia   2.  Mild interstitial pulmonary edema      DX-ESOPHAGUS - [id number]   Final Result      DX-ABDOMEN FOR TUBE PLACEMENT   Final Result         1.  Nonspecific bowel gas pattern in the upper abdomen.   2.  Nasogastric tube is coiled within the stomach, the tip terminates overlying the expected location of the gastric cardia.   3.  Cardiomegaly      DX-ABDOMEN FOR TUBE PLACEMENT   Final Result         1.  Nonspecific bowel gas pattern in the upper abdomen.   2.  Nasogastric tube terminating just distal to the gastroesophageal junction, recommend advancement.   3.  Hazy bilateral pulmonary edema and/or infiltrates   4.  Cardiomegaly      DX-ABDOMEN FOR TUBE PLACEMENT   Final Result      1.  Enteric tube overlies the gastric body.      DX-ABDOMEN FOR TUBE PLACEMENT   Final Result      1.  NG tube side port projects at the gastroesophageal junction. Recommend advancing before use.   2.  Bilateral pleural effusions with adjacent airspace disease.      IR-MIDLINE CATHETER INSERTION WO GUIDANCE > AGE 3   Final Result                  Ultrasound-guided midline placement performed by qualified nursing staff    as above.          DX-CHEST-PORTABLE (1 VIEW)   Final Result      RIGHT larger than LEFT pleural effusion with overlying atelectasis and/or airspace disease and suspected superimposed pulmonary edema, increased since prior      EC-ECHOCARDIOGRAM COMPLETE W/O CONT   Final Result      DX-CHEST-PORTABLE (1 VIEW)   Final Result      RIGHT larger than LEFT pleural effusion with overlying atelectasis and/or airspace disease and suspected superimposed pulmonary edema, unchanged      DX-ABDOMEN FOR TUBE PLACEMENT   Final Result         Gastric drainage tube with tip projecting over the expected area of the stomach.      DX-ABDOMEN FOR TUBE PLACEMENT   Final Result          Gastric drainage tube coiled in the stomach with tip projecting over the expected area of the distal stomach.      DX-CHEST-PORTABLE (1 VIEW)   Final Result      1.  Likely layering bilateral pleural effusions.   2.  Interstitial infiltrates and/or edema.   3.  Support apparatus as above.      DX-CHEST-PORTABLE (1 VIEW)   Final Result      1.  Pulmonary edema. Multifocal pneumonia could have a similar appearance.   2.  Likely small/moderate bilateral pleural effusions.      DX-CHEST-PORTABLE (1 VIEW)   Final Result      No acute cardiopulmonary abnormality.      CT-CSPINE WITHOUT PLUS RECONS   Final Result      No acute fracture or dislocation of the cervical spine.      CT-HEAD W/O   Final Result      1.  Cerebral atrophy.      2.  White matter lucencies most consistent with small vessel ischemic change versus demyelination or gliosis.      3.  Otherwise, Head CT without contrast with no acute findings. No evidence of acute cerebral infarction, hemorrhage or mass lesion.              Assessment/Plan  * Respiratory failure requiring intubation (HCC)- (present on admission)  Assessment & Plan  Secondary to aspiration pneumonia and altered sensorium from alcohol withdrawal  Intubated October 9, subsequently extubated October 10  Currently on nasal cannula O2  Continue O2 and RT protocols  Mobilize  I-S      Oropharyngeal dysphagia- (present on admission)  Assessment & Plan  Now on a modified diet per speech therapy  Continue to monitor for signs of aspiration    Urinary tract infection- (present on admission)  Assessment & Plan  Febrile with flank pain  Urine Cx (10/8) - E. Faecalis, sensitive to ampicillin, nitrofurantoin, penicillin  Completed antibiotics    Aspiration pneumonitis (HCC)- (present on admission)  Assessment & Plan  Secondary to alcohol withdrawal syndrome  Blood cultures have been negative as has the BAL  S/p completed course of antibiotics  Speech therapy has evaluated and he is on modified  diet    Fever- (present on admission)  Assessment & Plan  Treated for aspiration pneumonia and Enterococcus faecalis UTI  Observe off antibiotics    Conjunctivitis of both eyes- (present on admission)  Assessment & Plan  Completed topical therapy    Elevated troponin- (present on admission)  Assessment & Plan  Secondary to demand ischemia    Macrocytic anemia- (present on admission)  Assessment & Plan  Pt with hemoglobin of 12.7 and MCV at 104.5 on admission; Likely due to heavy alcohol use.   - Continue to monitor CBC    - Continue multivitamin and thiamine supplement and folate    Thrombocytopenia (HCC)- (present on admission)  Assessment & Plan  Now in the normal range    Essential tremor- (present on admission)  Assessment & Plan  Monitor    Hypokalemia- (present on admission)  Assessment & Plan  Replete and monitor closely    Alcohol withdrawal syndrome, with delirium (HCC)- (present on admission)  Assessment & Plan  Admitted on 10/6, reported drinks 1-2 bottles of wine  Initially admitted on a CIWA protocol, subsequently went to the ICU and required intubation but now is through his DTs.    Continue to monitor under CIWA protocol    I am in daily      Syncope and collapse- (present on admission)  Assessment & Plan  Admitted for syncope 10/4, likely alcohol related  Echo (10/10/23): EF 55%, no valvular abnormalities  Telemetry and EKG have been unremarkable in this regard  Patient has not had any further symptoms    Neuropathy- (present on admission)  Assessment & Plan  Continue Cymbalta    Hyperlipidemia- (present on admission)  Assessment & Plan  Continue rosuvastatin    Benign prostatic hyperplasia with urinary retention- (present on admission)  Assessment & Plan  Continue doxazosin    Primary hypertension- (present on admission)  Assessment & Plan  Continue doxazosin, losartan as tolerated  Diuresis monitor         VTE prophylaxis:   SCDs/TEDs      I have performed a physical exam and reviewed and updated  ROS and Plan today (10/19/2023). In review of yesterday's note (10/18/2023), there are no changes except as documented above.

## 2023-10-19 NOTE — PROGRESS NOTES
Patient was agitated and restless throughout the entire night. He continuously tried to get out of bed. Toileting, drink, and food were offered and provided each time he tried to get up. During the beginning of the shift he was easily redirected but as the night went on he seemed to get more forgetful and more confused. A lap belt was put on the patient at 0300. He still continued to try to get out of bed and a care aide was needed to sit outside his room at 0330 for the patient's safety.

## 2023-10-19 NOTE — PROGRESS NOTES
Report received from NOC RN and assumed patient care at 0700. Patient is A&Ox 2, (disoriented to time, situation) on 2L NC. Bed alarm in place. Patient denies any pain or other needs at this time. Reinforced the need to call for assistance. Maza catheter in place, draining yellow urine. Scattered scabs on BLE and BUE noted. Call light within reach and bed in lowest position.

## 2023-10-20 ENCOUNTER — PATIENT OUTREACH (OUTPATIENT)
Dept: SCHEDULING | Facility: IMAGING CENTER | Age: 73
End: 2023-10-20
Payer: MEDICARE

## 2023-10-20 VITALS
TEMPERATURE: 97.1 F | BODY MASS INDEX: 25.61 KG/M2 | HEART RATE: 74 BPM | WEIGHT: 199.52 LBS | DIASTOLIC BLOOD PRESSURE: 72 MMHG | SYSTOLIC BLOOD PRESSURE: 110 MMHG | RESPIRATION RATE: 16 BRPM | HEIGHT: 74 IN | OXYGEN SATURATION: 94 %

## 2023-10-20 PROBLEM — H10.9 CONJUNCTIVITIS OF BOTH EYES: Status: RESOLVED | Noted: 2023-10-06 | Resolved: 2023-10-20

## 2023-10-20 PROBLEM — R50.9 FEVER: Status: RESOLVED | Noted: 2023-10-07 | Resolved: 2023-10-20

## 2023-10-20 PROBLEM — R55 SYNCOPE AND COLLAPSE: Status: RESOLVED | Noted: 2023-10-04 | Resolved: 2023-10-20

## 2023-10-20 PROBLEM — J69.0 ASPIRATION PNEUMONITIS (HCC): Status: RESOLVED | Noted: 2023-10-09 | Resolved: 2023-10-20

## 2023-10-20 PROBLEM — E87.6 HYPOKALEMIA: Status: RESOLVED | Noted: 2023-10-04 | Resolved: 2023-10-20

## 2023-10-20 PROBLEM — R79.89 ELEVATED TROPONIN: Status: RESOLVED | Noted: 2023-10-05 | Resolved: 2023-10-20

## 2023-10-20 PROBLEM — F10.931 ALCOHOL WITHDRAWAL SYNDROME, WITH DELIRIUM (HCC): Status: RESOLVED | Noted: 2023-10-04 | Resolved: 2023-10-20

## 2023-10-20 PROBLEM — J96.90 RESPIRATORY FAILURE REQUIRING INTUBATION (HCC): Status: RESOLVED | Noted: 2023-10-09 | Resolved: 2023-10-20

## 2023-10-20 PROBLEM — N39.0 URINARY TRACT INFECTION: Status: RESOLVED | Noted: 2023-10-10 | Resolved: 2023-10-20

## 2023-10-20 LAB
MAGNESIUM SERPL-MCNC: 2.7 MG/DL (ref 1.5–2.5)
PHOSPHATE SERPL-MCNC: 4.1 MG/DL (ref 2.5–4.5)

## 2023-10-20 PROCEDURE — 700102 HCHG RX REV CODE 250 W/ 637 OVERRIDE(OP): Performed by: INTERNAL MEDICINE

## 2023-10-20 PROCEDURE — 51798 US URINE CAPACITY MEASURE: CPT

## 2023-10-20 PROCEDURE — 84100 ASSAY OF PHOSPHORUS: CPT

## 2023-10-20 PROCEDURE — 83735 ASSAY OF MAGNESIUM: CPT

## 2023-10-20 PROCEDURE — 97530 THERAPEUTIC ACTIVITIES: CPT

## 2023-10-20 PROCEDURE — 700102 HCHG RX REV CODE 250 W/ 637 OVERRIDE(OP)

## 2023-10-20 PROCEDURE — A9270 NON-COVERED ITEM OR SERVICE: HCPCS

## 2023-10-20 PROCEDURE — 97110 THERAPEUTIC EXERCISES: CPT

## 2023-10-20 PROCEDURE — A9270 NON-COVERED ITEM OR SERVICE: HCPCS | Performed by: INTERNAL MEDICINE

## 2023-10-20 PROCEDURE — 99239 HOSP IP/OBS DSCHRG MGMT >30: CPT | Performed by: HOSPITALIST

## 2023-10-20 PROCEDURE — 36415 COLL VENOUS BLD VENIPUNCTURE: CPT

## 2023-10-20 PROCEDURE — 700111 HCHG RX REV CODE 636 W/ 250 OVERRIDE (IP): Performed by: INTERNAL MEDICINE

## 2023-10-20 RX ORDER — LANOLIN ALCOHOL/MO/W.PET/CERES
100 CREAM (GRAM) TOPICAL DAILY
Qty: 30 TABLET | Status: SHIPPED
Start: 2023-10-21 | End: 2023-11-07 | Stop reason: SDUPTHER

## 2023-10-20 RX ORDER — TAMSULOSIN HYDROCHLORIDE 0.4 MG/1
0.4 CAPSULE ORAL NIGHTLY
Qty: 30 CAPSULE | Status: SHIPPED
Start: 2023-10-20

## 2023-10-20 RX ORDER — QUETIAPINE FUMARATE 25 MG/1
25 TABLET, FILM COATED ORAL NIGHTLY
Qty: 30 TABLET | Refills: 0 | Status: SHIPPED
Start: 2023-10-20 | End: 2023-11-19

## 2023-10-20 RX ORDER — QUETIAPINE FUMARATE 25 MG/1
25 TABLET, FILM COATED ORAL NIGHTLY PRN
Qty: 60 TABLET | Refills: 3 | Status: SHIPPED
Start: 2023-10-20 | End: 2023-10-20 | Stop reason: SDUPTHER

## 2023-10-20 RX ADMIN — FUROSEMIDE 20 MG: 10 INJECTION, SOLUTION INTRAVENOUS at 05:51

## 2023-10-20 RX ADMIN — LOSARTAN POTASSIUM 50 MG: 50 TABLET, FILM COATED ORAL at 05:48

## 2023-10-20 RX ADMIN — Medication 100 MG: at 05:48

## 2023-10-20 RX ADMIN — FOLIC ACID 1 MG: 1 TABLET ORAL at 05:48

## 2023-10-20 RX ADMIN — NYSTATIN 500000 UNITS: 100000 SUSPENSION ORAL at 09:19

## 2023-10-20 RX ADMIN — DULOXETINE HYDROCHLORIDE 30 MG: 30 CAPSULE, DELAYED RELEASE ORAL at 05:48

## 2023-10-20 RX ADMIN — VALPROIC ACID 250 MG: 250 CAPSULE, LIQUID FILLED ORAL at 13:12

## 2023-10-20 RX ADMIN — CLOBETASOL PROPIONATE CREAM USP, 0.05%: 0.5 CREAM TOPICAL at 05:51

## 2023-10-20 RX ADMIN — THERA TABS 1 TABLET: TAB at 05:48

## 2023-10-20 RX ADMIN — POTASSIUM BICARBONATE 50 MEQ: 978 TABLET, EFFERVESCENT ORAL at 05:47

## 2023-10-20 RX ADMIN — NYSTATIN 500000 UNITS: 100000 SUSPENSION ORAL at 13:11

## 2023-10-20 RX ADMIN — VALPROIC ACID 250 MG: 250 CAPSULE, LIQUID FILLED ORAL at 05:46

## 2023-10-20 ASSESSMENT — PAIN DESCRIPTION - PAIN TYPE
TYPE: ACUTE PAIN

## 2023-10-20 ASSESSMENT — GAIT ASSESSMENTS
GAIT LEVEL OF ASSIST: CONTACT GUARD ASSIST
DISTANCE (FEET): 50
ASSISTIVE DEVICE: FRONT WHEEL WALKER

## 2023-10-20 ASSESSMENT — COGNITIVE AND FUNCTIONAL STATUS - GENERAL
TURNING FROM BACK TO SIDE WHILE IN FLAT BAD: A LOT
MOVING FROM LYING ON BACK TO SITTING ON SIDE OF FLAT BED: A LOT
MOBILITY SCORE: 14
WALKING IN HOSPITAL ROOM: A LITTLE
CLIMB 3 TO 5 STEPS WITH RAILING: A LOT
STANDING UP FROM CHAIR USING ARMS: A LITTLE
MOVING TO AND FROM BED TO CHAIR: A LOT
SUGGESTED CMS G CODE MODIFIER MOBILITY: CL

## 2023-10-20 NOTE — DISCHARGE PLANNING
DC Transport Scheduled    Received request at: 10/20/2023 at 1131    Transport Company Scheduled:  [employer]  Spoke with Surekha at [facility] to schedule transport.    Scheduled Date: 10/20/2023  Scheduled Time: 1515    Destination: [facility] at [address] [place] [address]     Notified care team of scheduled transport via [employer].     If there are any changes needed to the DC transportation scheduled, please contact [facility] at ext. [phone] between the hours of 5468-0519 Mon-Fri. If outside those hours, contact the ED Case Manager at ext. [phone].

## 2023-10-20 NOTE — PROGRESS NOTES
Midline D/C'd.  Discharge instructions provided to pt.  Pt states understanding.  Pt states all questions have been answered.  Copy of discharge provided to pt.  Signed copy in chart.  Prescriptions provided to pt, copy in chart. Pt states that all personal belongings are in possession.  Pt escorted off unit with assistance from [employer] without incident.

## 2023-10-20 NOTE — PROGRESS NOTES
Patient called RN and CNA into the room because he felt like he needed to void once again. RN assisted patient with positioning urinal with patient sitting at the edge of the bed. Patient unable to void. Patient stood up and attempted to void, also unsuccessful. RN completed bladder scan, 475 mL. Hospitalist on the floor notified of bladder scan results, wants RN to straight cath. Floor out of stock of straight cath supplies, order placed.     Straight cath completed, 300 mL of urine removed.

## 2023-10-20 NOTE — PROGRESS NOTES
Received report and assumed care of patient at change of shift. Patient is A&Ox4, on 2L O2 nasal cannula, and denies pain at this time. Patient assessment completed, bed in lowest position, and call light and personal belongings are within reach. Patient expressed no further needs at this time.

## 2023-10-20 NOTE — PROGRESS NOTES
Report received from NOC RN and assumed patient care. Patient is A&Ox 3 (disoriented to time), currently on RA with an O2 sat of 90% with bed alarm in place. Pt denies any pain at this time. Call light within reach and bed in lowest position. Reinforced the need to call for assistance. Pt expressed no further needs at this time.

## 2023-10-20 NOTE — DISCHARGE PLANNING
Case Management Discharge Planning    Admission Date: 10/4/2023  GMLOS: 5  ALOS: 16    6-Clicks ADL Score: 12  6-Clicks Mobility Score: 14  PT and/or OT Eval ordered: Yes  Post-acute Referrals Ordered: Yes  Post-acute Choice Obtained: Yes  Has referral(s) been sent to post-acute provider:  Yes      Anticipated Discharge Dispo: Discharge Disposition: D/T to SNF with Medicare cert in anticipation of skilled care (03)      DME Needed: No    Action(s) Taken: Updated Provider/Nurse on Discharge Plan    Pt was discussed in IDT rounds [facility].    Maritza Knutson and [facility] form fax to Karla ANNE     [id number], received confirmation from maritza Sharma for transport Scheduled Date: 10/20/2023 Scheduled Time: 1515.    [facility] Transfer packet prepared.      Escalations Completed: None    Medically Clear: Yes    Next Steps: CM will continue to assist Pt with discharge needs.      Barriers to Discharge: Pending Placement    Is the patient up for discharge tomorrow: No

## 2023-10-20 NOTE — CARE PLAN
The patient is Stable - Low risk of patient condition declining or worsening    Shift Goals  Clinical Goals: Increase oral intake of food, remain free from falls, monitor for retention  Patient Goals: Comfort    Progress made toward(s) clinical / shift goals:  Patient had 1:1 supervision for meals. CNA encouraged increased oral intake of dinner. Patient had bed alarm in place and remained free from falls. Patient had his bladder scanned to assess for retention. Patient had straight catheter completed to drain urine from his bladder, as he was unable to void. Patient rested comfortably in bed throughout the night.       Problem: Fall Risk  Goal: Patient will remain free from falls  Outcome: Progressing     Problem: Knowledge Deficit - Standard  Goal: Patient and family/care givers will demonstrate understanding of plan of care, disease process/condition, diagnostic tests and medications  Outcome: Progressing       Patient is not progressing towards the following goals:

## 2023-10-20 NOTE — DISCHARGE PLANNING
Spoke To: Magy  Agency[facility]  Plan [facility] Request[phone]

## 2023-10-20 NOTE — DISCHARGE SUMMARY
Discharge Summary    CHIEF COMPLAINT ON ADMISSION  Chief Complaint   Patient presents with    Syncope     Pt experienced a syncopal episode hittin ghis R eyebrow on the coffee table. Pt reports increase in dizziness over the last 2 weeks. Pt takes BP medication regularly, initial SBP-98. GCS15       Reason for Admission  TBI     Admission Date  10/4/2023    CODE STATUS  Full Code    HPI & HOSPITAL COURSE  71yo history of alcohol abuse, hypertension, dyslipidemia presenting after syncopal event at home.  He had recently been in an inpatient alcohol detox program, but was found to be intoxicated on presentation.  In-house he underwent treatment for withdrawal with CIWA protocol at 1 point he was found to be hypoxic likely from Ativan, admitted to the ICU, there is a question of aspiration at that time.  Chest x-ray showed bilateral infiltrates and he was transferred to the ICU where he was intubated on October 9.  He went on to be extubated on October 10 and then came back to the floor with significant confusion    During his hospital stay patient was given daily thiamine and diuresis with Lasix.  Patient was given Ativan as needed for his withdrawal symptoms.  He had intermittent agitation that Seroquel and Depakote was initiated.  As his mental status improved we were able to wean off the medications down to the Seroquel as needed.  Debilitated and was referred to a skilled facility for ongoing PT and OT.  Once a bed is available he was transferred to a skilled facility      Hospital course complicated by urinary retention for the Maza catheter was placed.  He was initiated on Flomax and he had a voiding trial on 10/19/2023.    Patient will require ongoing monitoring his postvoid residuals to see whether or not the Maza catheter needs to be replaced    Therefore, he is discharged in good and stable condition to skilled nursing facility.    The patient recovered much more quickly than anticipated on  admission.    Discharge Date      FOLLOW UP ITEMS POST DISCHARGE      DISCHARGE DIAGNOSES  Principal Problem (Resolved):    Respiratory failure requiring intubation (HCC) (POA: Yes)  Active Problems:    Primary hypertension (POA: Yes)    Benign prostatic hyperplasia with urinary retention (POA: Yes)    Hyperlipidemia (POA: Yes)      Overview: June 2023: The 10-year ASCVD risk score (Marilee MORALES, et al., 2019) is:       22.9%            I stopped pravastatin and started Crestor    Neuropathy (POA: Yes)      Overview: 5/11/23      Chronic and unstable      Cannot tolerate pregabalin      Has seen neurology in the past and they did nerve conduction studies and       he was told neuropathy was not too bad.  His symptoms are problematic for       him.  He is not a diabetic.  We tried to increase gabapentin dose from 100       mg in the evening to 300 mg in the evening and patient reports it makes       him too sleepy.  He has not been drinking alcohol daily    Essential tremor (POA: Yes)    Thrombocytopenia (HCC) (POA: Yes)    Macrocytic anemia (POA: Yes)    Oropharyngeal dysphagia (POA: Yes)  Resolved Problems:    Syncope and collapse (POA: Yes)    Alcohol withdrawal syndrome, with delirium (HCC) (POA: Yes)    Hypokalemia (POA: Yes)    Elevated troponin (POA: Yes)    Conjunctivitis of both eyes (POA: Yes)    Fever (POA: Yes)    Aspiration pneumonitis (HCC) (POA: Yes)    Urinary tract infection (POA: Yes)      FOLLOW UP  Future Appointments   Date Time Provider Department Center   12/21/2023 10:00 AM Sandra Moreau P.A.-C. [facility]     [facility] [address] [address]  [phone]          MEDICATIONS ON DISCHARGE     Medication List        START taking these medications        Instructions   QUEtiapine 25 MG Tabs  Commonly known as: SEROquel   Take 1 Tablet by mouth every evening for 30 days.  Dose: 25 mg     tamsulosin 0.4 MG capsule  Commonly known as: Flomax   Take 1  Capsule by mouth every evening.  Dose: 0.4 mg     thiamine 100 MG tablet  Start taking on: October 21, 2023  Commonly known as: Thiamine   Take 1 Tablet by mouth every day.  Dose: 100 mg            CONTINUE taking these medications        Instructions   acetaminophen 500 MG Tabs  Commonly known as: Tylenol   Take 1,000 mg by mouth 1 time a day as needed for Mild Pain.  Dose: 1,000 mg     B COMPLEX 1 PO   Take 1 Tablet by mouth every day.  Dose: 1 Tablet     doxazosin 4 MG Tabs  Commonly known as: Cardura   Take 1 Tablet by mouth every evening.  Dose: 4 mg     DULoxetine 30 MG Cpep  Commonly known as: Cymbalta   Take 1 Capsule by mouth every day.  Dose: 30 mg     losartan 50 MG Tabs  Commonly known as: Cozaar   Take 1 Tablet by mouth every day.  Dose: 50 mg     rosuvastatin 20 MG Tabs  Commonly known as: Crestor   Take 1 Tablet by mouth every evening.  Dose: 20 mg     JAYLENE-C PO   Take 1 Tablet by mouth every day.  Dose: 1 Tablet     VITAMIN D3 PO   Take 1 Tablet by mouth every day.  Dose: 1 Tablet            STOP taking these medications      ibuprofen 200 MG Tabs  Commonly known as: Motrin              Allergies  No Known Allergies    DIET  Orders Placed This Encounter   Procedures    Diet Order Diet: Regular (pills crushed); Tray Modifications (optional): SLP - 1:1 Supervision by Nursing, SLP - Deliver to Nursing Station     Standing Status:   Standing     Number of Occurrences:   1     Order Specific Question:   Diet:     Answer:   Regular [1]     Comments:   pills crushed     Order Specific Question:   Tray Modifications (optional)     Answer:   SLP - 1:1 Supervision by Nursing     Order Specific Question:   Tray Modifications (optional)     Answer:   SLP - Deliver to Nursing Station       ACTIVITY  As tolerated.  Weight bearing as tolerated    CONSULTATIONS      PROCEDURES      LABORATORY  Lab Results   Component Value Date    SODIUM 143 10/17/2023    POTASSIUM 4.2 10/17/2023    CHLORIDE 102 10/17/2023    CO2 34  (H) 10/17/2023    GLUCOSE 103 (H) 10/17/2023    BUN 16 10/17/2023    CREATININE 0.64 10/17/2023        Lab Results   Component Value Date    WBC 8.2 10/17/2023    HEMOGLOBIN 13.9 (L) 10/17/2023    HEMATOCRIT 41.4 (L) 10/17/2023    PLATELETCT 286 10/17/2023        Total time of the discharge process exceeds 37  minutes.

## 2023-10-20 NOTE — PROGRESS NOTES
"Patient called RN and said he felt like he needed to void, was provided with a urinal per request and given time to void. Checked back in on patient a few minutes later and he stated he was having trouble \"letting it out\". Had copeland removed at 1500 earlier today. I noticed some of his gown was slightly soiled from trying to void, I did a post void bladder scan which resulted in >330ml. Patient said he didn't feel completely full but did have the urge. We agreed to give patient a couple more hours to void and if he could not we would do straight cath.   "

## 2023-10-20 NOTE — THERAPY
Physical Therapy   Daily Treatment     Patient Name: Balwinder Grimes  Age:  72 y.o., Sex:  male  Medical Record #: [id number]  Today's Date: 10/20/2023     Precautions  Precautions: Fall Risk;Swallow Precautions    Assessment    Patient progressing with functional mobility but remains limited by apparent impaired cognition, impaired balance and coordination, functional weakness, and decreased activity tolerance which are limiting his ability to safely perform mobility at OF. He mobilized as detailed below; he required CGA/min A for safety. Recommend patient mobilize to chair 3x/day at meal times with RN staff. Will continue to follow.    Plan    Treatment Plan Status: Continue Current Treatment Plan  Type of Treatment: Bed Mobility, Gait Training, Neuro Re-Education / Balance, Self Care / Home Evaluation, Stair Training, Therapeutic Activities, Therapeutic Exercise  Treatment Frequency: 4 Times per Week  Treatment Duration: Until Therapy Goals Met    DC Equipment Recommendations: Unable to determine at this time  Discharge Recommendations: Recommend post-acute placement for additional physical therapy services prior to discharge home (however depending on social support with continued progress may reach safe level for home)      Subjective    RN cleared patient for therapy, received in bed, agreeable.     Objective       10/20/23 0834   Charge Group   Charges  Yes   PT Therapeutic Activities (Units) 1   PT Therapeutic Exercise (Units) 1   Total Time Spent   PT Total Time Yes   PT Therapeutic Activities Time Spent (Mins) 15   PT Therapeutic Exercise Time Spent (Mins) 8   PT Total Time Spent (Calculated) 23   Precautions   Precautions Fall Risk;Swallow Precautions   Vitals   O2 (LPM) 0   O2 Delivery Device None - Room Air   Vitals Comments increased WOB with activity   Pain 0 - 10 Group   Therapist Pain Assessment   (no pain complaint during session)   Cognition    Cognition / Consciousness X   Level of  "Consciousness Alert   Ability To Follow Commands 2 Step   Safety Awareness Impaired;Impulsive   New Learning Impaired   Attention Impaired   Comments confused, stating his parents were coming to visit. reported he fell \"the 18th of last month\" and \"hit his head on the toilet.\" cooperative   Active ROM Lower Body    Comments functional for mobility   Strength Lower Body   Comments gross BLE weakness but functional for mobility. lacks endurance   Sensation Lower Body   Comments patient reported PN baseline   Standing Lower Body Exercises   Comments standing marching and STS x5 from chair   Balance   Sitting Balance (Static) Fair -   Sitting Balance (Dynamic) Fair -   Standing Balance (Static) Fair -   Standing Balance (Dynamic) Fair -   Weight Shift Sitting Fair   Weight Shift Standing Fair   Skilled Intervention Verbal Cuing;Compensatory Strategies;Sequencing;Facilitation   Comments initially posterior lean in standing but improved with continued time up. no overt LOB but unsteady during ambulation. used FWW   Bed Mobility    Supine to Sit Minimal Assist   Sit to Supine   (NT, left in chair)   Scooting Supervised  (seated)   Skilled Intervention Verbal Cuing;Compensatory Strategies;Facilitation;Sequencing   Comments used bed features   Gait Analysis   Gait Level Of Assist Contact Guard Assist   Assistive Device Front Wheel Walker   Distance (Feet) 50   # of Times Distance was Traveled 3   Deviation   (decreased melquiades, step length, inconsistent ESSENCE)   # of Stairs Climbed 0   Weight Bearing Status no restrictions   Vision Deficits Impacting Mobility NT   Skilled Intervention Verbal Cuing;Compensatory Strategies;Sequencing   Comments standing rest breaks between bouts of ambulation   Functional Mobility   Sit to Stand Minimal Assist  (progressed to SBA during session)   Bed, Chair, Wheelchair Transfer Minimal Assist   Transfer Method Stand Step   Skilled Intervention Verbal Cuing;Compensatory Strategies;Sequencing "   How much difficulty does the patient currently have...   Turning over in bed (including adjusting bedclothes, sheets and blankets)? 2   Sitting down on and standing up from a chair with arms (e.g., wheelchair, bedside commode, etc.) 2   Moving from lying on back to sitting on the side of the bed? 2   How much help from another person does the patient currently need...   Moving to and from a bed to a chair (including a wheelchair)? 3   Need to walk in a hospital room? 3   Climbing 3-5 steps with a railing? 2   6 clicks Mobility Score 14   Short Term Goals    Short Term Goal # 1 Pt will perform STS/functional transfers with FWW and SPV in 6 visits to progress bed mobility   Goal Outcome # 1 Progressing as expected   Short Term Goal # 2 Pt will ambualte at least 200 ft with FWW and SPV in 6 visits to progress functional independence   Goal Outcome # 2 Progressing as expected   Short Term Goal # 3 Pt will negotiate 2 stairs with SPV in 6 visits to safely access home   Goal Outcome # 3 Goal not met   Short Term Goal # 4 The pt will demo supine<>sit EOB w/ HOB flat and no rails spv in 6 visits for independence w/ bed mobility.   Goal Outcome # 4 Progressing as expected   Physical Therapy Treatment Plan   Physical Therapy Treatment Plan Continue Current Treatment Plan   Anticipated Discharge Equipment and Recommendations   DC Equipment Recommendations Unable to determine at this time   Discharge Recommendations Recommend post-acute placement for additional physical therapy services prior to discharge home  (however depending on social support with continued progress may reach safe level for home)   Interdisciplinary Plan of Care Collaboration   IDT Collaboration with  Nursing   Patient Position at End of Therapy Seated;Chair Alarm On;Call Light within Reach;Tray Table within Reach;Phone within Reach   Collaboration Comments RN   Session Information   Date / Session Number  10/20-4 (2/4, 10/21)

## 2023-11-06 ENCOUNTER — TELEPHONE (OUTPATIENT)
Dept: MEDICAL GROUP | Facility: MEDICAL CENTER | Age: 73
End: 2023-11-06

## 2023-11-06 NOTE — TELEPHONE ENCOUNTER
Phone Number Called: [phone]    Call outcome: Carmen ROSE [facility] needs the verbal order to help this patient   Balwinder Grimes  Male, 72 y.o., 1950  MRN: [id number]  Phone: [phone]  Because he was at hospital for covid and needs help for abuse of alcohol   I called the nurse and she needs the verbal order for you please call her back to this number [phone]

## 2023-11-07 ENCOUNTER — OFFICE VISIT (OUTPATIENT)
Dept: MEDICAL GROUP | Facility: MEDICAL CENTER | Age: 73
End: 2023-11-07
Payer: MEDICARE

## 2023-11-07 VITALS
BODY MASS INDEX: 23.87 KG/M2 | HEART RATE: 89 BPM | OXYGEN SATURATION: 94 % | HEIGHT: 74 IN | WEIGHT: 186 LBS | RESPIRATION RATE: 20 BRPM | SYSTOLIC BLOOD PRESSURE: 118 MMHG | TEMPERATURE: 97 F | DIASTOLIC BLOOD PRESSURE: 76 MMHG

## 2023-11-07 DIAGNOSIS — G62.9 NEUROPATHY: ICD-10-CM

## 2023-11-07 DIAGNOSIS — Z12.12 SCREENING FOR COLORECTAL CANCER: ICD-10-CM

## 2023-11-07 DIAGNOSIS — F10.931 ALCOHOL WITHDRAWAL SYNDROME, WITH DELIRIUM (HCC): ICD-10-CM

## 2023-11-07 DIAGNOSIS — I71.40 ABDOMINAL AORTIC ANEURYSM (AAA) WITHOUT RUPTURE, UNSPECIFIED PART (HCC): ICD-10-CM

## 2023-11-07 DIAGNOSIS — R73.03 PREDIABETES: ICD-10-CM

## 2023-11-07 DIAGNOSIS — Z12.11 SCREENING FOR COLORECTAL CANCER: ICD-10-CM

## 2023-11-07 DIAGNOSIS — F10.21 ALCOHOLISM IN REMISSION (HCC): ICD-10-CM

## 2023-11-07 DIAGNOSIS — E78.2 MIXED HYPERLIPIDEMIA: ICD-10-CM

## 2023-11-07 PROCEDURE — 3074F SYST BP LT 130 MM HG: CPT | Performed by: PHYSICIAN ASSISTANT

## 2023-11-07 PROCEDURE — 99214 OFFICE O/P EST MOD 30 MIN: CPT | Performed by: PHYSICIAN ASSISTANT

## 2023-11-07 PROCEDURE — 3078F DIAST BP <80 MM HG: CPT | Performed by: PHYSICIAN ASSISTANT

## 2023-11-07 RX ORDER — NALTREXONE HYDROCHLORIDE 50 MG/1
50 TABLET, FILM COATED ORAL DAILY
Qty: 30 TABLET | Refills: 5 | Status: SHIPPED | OUTPATIENT
Start: 2023-11-07

## 2023-11-07 RX ORDER — LANOLIN ALCOHOL/MO/W.PET/CERES
100 CREAM (GRAM) TOPICAL DAILY
Qty: 30 TABLET | Refills: 2 | Status: SHIPPED | OUTPATIENT
Start: 2023-11-07

## 2023-11-07 ASSESSMENT — FIBROSIS 4 INDEX: FIB4 SCORE: 1.29

## 2023-11-07 NOTE — PROGRESS NOTES
Subjective:   Balwinder Grimes is a 72 y.o. male here today for     HPI: Patient is here to discuss:  Problem   Alcoholism in Remission (Hcc)    Hospitalized in October with subsequent complications.  Patient has not had an alcoholic beverage since October 17, 2023            Current medicines (including changes today)  Current Outpatient Medications   Medication Sig Dispense Refill    naltrexone (DEPADE) 50 MG Tab Take 1 Tablet by mouth every day. 30 Tablet 5    thiamine (THIAMINE) 100 MG tablet Take 1 Tablet by mouth every day. 30 Tablet 2    tamsulosin (FLOMAX) 0.4 MG capsule Take 1 Capsule by mouth every evening. 30 Capsule     QUEtiapine (SEROQUEL) 25 MG Tab Take 1 Tablet by mouth every evening for 30 days. 30 Tablet 0    B Complex Vitamins (B COMPLEX 1 PO) Take 1 Tablet by mouth every day.      Cholecalciferol (VITAMIN D3 PO) Take 1 Tablet by mouth every day.      Ascorbic Acid (JAYLENE-C PO) Take 1 Tablet by mouth every day.      acetaminophen (TYLENOL) 500 MG Tab Take 1,000 mg by mouth 1 time a day as needed for Mild Pain.      DULoxetine (CYMBALTA) 30 MG Cap DR Particles Take 1 Capsule by mouth every day. 30 Capsule 3    rosuvastatin (CRESTOR) 20 MG Tab Take 1 Tablet by mouth every evening. 30 Tablet 11    doxazosin (CARDURA) 4 MG Tab Take 1 Tablet by mouth every evening. 90 Tablet 3    losartan (COZAAR) 50 MG Tab Take 1 Tablet by mouth every day. 90 Tablet 2     No current facility-administered medications for this visit.     He  has a past medical history of High cholesterol, Hypertension, Numbness and tingling, and Pain (08/2018).  Patient has no known allergies.     Social History and Family History were reviewed and updated.    ROS   No headaches, chest pain, no shortness of breath, abdominal pain, nausea, or vomiting.  All other systems were reviewed and are negative or noted as positive in the HPI.       Objective:     /76   Pulse 89   Temp 36.1 °C (97 °F) (Temporal)   Resp 20   Ht 1.88 m  "(6' 2\")   Wt 84.4 kg (186 lb)   SpO2 94%  Body mass index is 23.88 kg/m².     Physical Exam:  General: Patient appears well-nourished, well-hydrated, nontoxic  HEENT, normocephalic atraumatic, PERRLA, extraocular movements intact, nares are patent and clear  Neck: No visible masses, thyromegaly or abnormalities noted  Cardiovascular.  Sitting comfortably without visible signs of edema  Lungs: No cyanosis noted, nondyspneic  Skin: Well perfused without evidence of rash or lesions  Neurological: Cranial nerves II through XII intact, normal gait  Musculoskeletal: Normal range of motion, normal strength and no deficit noted     Clinical Course/Lab Analysis:      IMPRESSION:     1.  Cerebral atrophy.     2.  White matter lucencies most consistent with small vessel ischemic change versus demyelination or gliosis.     3.  Otherwise, Head CT without contrast with no acute findings. No evidence of acute cerebral infarction, hemorrhage or mass lesion.  Assessment and Plan:   The following treatment plan was discussed.  Signs and symptoms for which to return were discussed with patient at length.  Patient verbalized understanding.    Problem List Items Addressed This Visit       AAA (abdominal aortic aneurysm) (HCC)    Hyperlipidemia    Prediabetes    Neuropathy    Alcoholism in remission (HCC)     Chronic and unstable  Balwinder has done well and has not had a beverage in approximately 3 weeks.  I Luz send him home on naltrexone.  I did review a recent metabolic panel that showed normal liver function.  Did discuss that naltrexone can help decrease the cravings and effect of alcohol         Relevant Medications    naltrexone (DEPADE) 50 MG Tab     Other Visit Diagnoses       Alcohol withdrawal syndrome, with delirium (HCC)        Relevant Medications    thiamine (THIAMINE) 100 MG tablet    Screening for colorectal cancer        Relevant Orders    Referral to GI for Colonoscopy           Alcoholism: chronic and " unstable  Starting naltrexone.  He defers on gabapentin.  Does not like side effects.  This is reasonable.  Do suggest starting naltrexone to help.  He is done very well. Recommend AA    1.  Abdominal aortic aneurysm, chronic condition: Patient has had incidental finding of a AAA that was approximately 2.8 cm in diameter.  He did get serial ultrasounds that showed stability.  We did a repeat ultrasound in June 2023 and it is now at 3.8 cm in diameter.  Last ultrasound was approximately 6 years ago.  We will follow serially and order another 1 in June 2024 and I will send to vascular surgery if size increases by 0.5 cm or goes over the recommended repair limit     2.  Neuropathy, chronic and unstable: Stable right now on duloxetine 30 mg daily.  We wish to avoid gabapentin as he does not like how he feels on it    3.  Mixed hyperlipidemia, chronic condition that is stable.  Patient was on simvastatin for cholesterol.  The 10-year ASCVD risk score (Marilee MORALES, et al., 2019) is: 22.9%  I stop the pravastatin and started Crestor at a dose of 20 mg.  Initially months milligrams to make sure he can tolerate.  With an ASCVD of 22% he needs to be on high-dose     4.  Prediabetes new and unstable.  Hemoglobin A1c was six-point percent.  You got to cut out the sugars     Followup:    Please note that this dictation was created using voice recognition software. I have made every reasonable attempt to correct obvious errors, but I expect that there are errors of grammar and possibly content that I did not discover before finalizing the note.

## 2023-11-07 NOTE — ASSESSMENT & PLAN NOTE
Chronic and unstable  Balwinder has done well and has not had a beverage in approximately 3 weeks.  I Luz send him home on naltrexone.  I did review a recent metabolic panel that showed normal liver function.  Did discuss that naltrexone can help decrease the cravings and effect of alcohol

## 2023-11-10 LAB — EKG IMPRESSION: NORMAL

## 2023-12-24 NOTE — TELEPHONE ENCOUNTER
Received request via: Pharmacy    Was the patient seen in the last year in this department? Yes    Does the patient have an active prescription (recently filled or refills available) for medication(s) requested? YES    Does the patient have FDC Plus and need 100 day supply (blood pressure, diabetes and cholesterol meds only)? Patient does not have SCP  Requested Prescriptions     Pending Prescriptions Disp Refills    doxazosin (CARDURA) 4 MG Tab [Pharmacy Med Name: DOXAZOSIN 4MG TABLETS] 90 Tablet 3     Sig: TAKE 1 TABLET BY MOUTH EVERY EVENING

## 2023-12-27 RX ORDER — DOXAZOSIN MESYLATE 4 MG/1
4 TABLET ORAL EVERY EVENING
Qty: 90 TABLET | Refills: 2 | Status: SHIPPED | OUTPATIENT
Start: 2023-12-27

## 2024-03-19 RX ORDER — LOSARTAN POTASSIUM 50 MG/1
50 TABLET ORAL DAILY
Qty: 90 TABLET | Refills: 2 | Status: SHIPPED | OUTPATIENT
Start: 2024-03-19

## 2024-03-19 NOTE — TELEPHONE ENCOUNTER
LOSARTAN 50MG TABLETS   Received request via: Pharmacy    Was the patient seen in the last year in this department? Yes    Does the patient have an active prescription (recently filled or refills available) for medication(s) requested? No    Pharmacy Name: [id number] - [place], [place] - [address]     Does the patient have FDC Plus and need 100 day supply (blood pressure, diabetes and cholesterol meds only)? Patient does not have SCP

## 2024-04-01 ENCOUNTER — APPOINTMENT (OUTPATIENT)
Dept: MEDICAL GROUP | Facility: MEDICAL CENTER | Age: 74
End: 2024-04-01
Payer: MEDICARE

## 2024-04-16 ENCOUNTER — APPOINTMENT (OUTPATIENT)
Dept: MEDICAL GROUP | Facility: MEDICAL CENTER | Age: 74
End: 2024-04-16
Payer: MEDICARE

## 2024-04-16 VITALS
TEMPERATURE: 98.4 F | SYSTOLIC BLOOD PRESSURE: 110 MMHG | WEIGHT: 211 LBS | DIASTOLIC BLOOD PRESSURE: 60 MMHG | HEART RATE: 66 BPM | OXYGEN SATURATION: 95 % | HEIGHT: 74 IN | BODY MASS INDEX: 27.08 KG/M2

## 2024-04-16 DIAGNOSIS — R73.03 PREDIABETES: ICD-10-CM

## 2024-04-16 DIAGNOSIS — R73.09 ELEVATED GLUCOSE: ICD-10-CM

## 2024-04-16 DIAGNOSIS — F10.21 ALCOHOLISM IN REMISSION (HCC): ICD-10-CM

## 2024-04-16 DIAGNOSIS — G62.9 NEUROPATHY: ICD-10-CM

## 2024-04-16 DIAGNOSIS — E78.2 MIXED HYPERLIPIDEMIA: ICD-10-CM

## 2024-04-16 DIAGNOSIS — N40.0 BENIGN PROSTATIC HYPERPLASIA WITHOUT LOWER URINARY TRACT SYMPTOMS: ICD-10-CM

## 2024-04-16 DIAGNOSIS — I71.40 ABDOMINAL AORTIC ANEURYSM (AAA) WITHOUT RUPTURE, UNSPECIFIED PART (HCC): ICD-10-CM

## 2024-04-16 DIAGNOSIS — I10 PRIMARY HYPERTENSION: ICD-10-CM

## 2024-04-16 PROCEDURE — 99214 OFFICE O/P EST MOD 30 MIN: CPT | Performed by: PHYSICIAN ASSISTANT

## 2024-04-16 PROCEDURE — 3074F SYST BP LT 130 MM HG: CPT | Performed by: PHYSICIAN ASSISTANT

## 2024-04-16 PROCEDURE — 3078F DIAST BP <80 MM HG: CPT | Performed by: PHYSICIAN ASSISTANT

## 2024-04-16 RX ORDER — ROSUVASTATIN CALCIUM 20 MG/1
20 TABLET, COATED ORAL EVERY EVENING
Qty: 90 TABLET | Refills: 3 | Status: SHIPPED | OUTPATIENT
Start: 2024-04-16

## 2024-04-16 RX ORDER — DOXAZOSIN MESYLATE 4 MG/1
4 TABLET ORAL EVERY EVENING
Qty: 90 TABLET | Refills: 3 | Status: SHIPPED | OUTPATIENT
Start: 2024-04-16

## 2024-04-16 RX ORDER — LOSARTAN POTASSIUM 50 MG/1
50 TABLET ORAL DAILY
Qty: 90 TABLET | Refills: 2 | Status: SHIPPED | OUTPATIENT
Start: 2024-04-16

## 2024-04-16 RX ORDER — DULOXETIN HYDROCHLORIDE 30 MG/1
30 CAPSULE, DELAYED RELEASE ORAL DAILY
Qty: 90 CAPSULE | Refills: 3 | Status: SHIPPED | OUTPATIENT
Start: 2024-04-16

## 2024-04-16 ASSESSMENT — FIBROSIS 4 INDEX: FIB4 SCORE: 1.31

## 2024-06-06 ENCOUNTER — HOSPITAL ENCOUNTER (OUTPATIENT)
Dept: LAB | Facility: MEDICAL CENTER | Age: 74
End: 2024-06-06
Attending: PHYSICIAN ASSISTANT
Payer: MEDICARE

## 2024-06-06 DIAGNOSIS — G62.9 NEUROPATHY: ICD-10-CM

## 2024-06-06 DIAGNOSIS — R73.09 ELEVATED GLUCOSE: ICD-10-CM

## 2024-06-06 DIAGNOSIS — I10 PRIMARY HYPERTENSION: ICD-10-CM

## 2024-06-06 LAB
ALBUMIN SERPL BCP-MCNC: 3.9 G/DL (ref 3.2–4.9)
ALBUMIN/GLOB SERPL: 1.4 G/DL
ALP SERPL-CCNC: 72 U/L (ref 30–99)
ALT SERPL-CCNC: 14 U/L (ref 2–50)
ANION GAP SERPL CALC-SCNC: 11 MMOL/L (ref 7–16)
AST SERPL-CCNC: 20 U/L (ref 12–45)
BASOPHILS # BLD AUTO: 0.7 % (ref 0–1.8)
BASOPHILS # BLD: 0.04 K/UL (ref 0–0.12)
BILIRUB SERPL-MCNC: 1.1 MG/DL (ref 0.1–1.5)
BUN SERPL-MCNC: 12 MG/DL (ref 8–22)
CALCIUM ALBUM COR SERPL-MCNC: 9.2 MG/DL (ref 8.5–10.5)
CALCIUM SERPL-MCNC: 9.1 MG/DL (ref 8.5–10.5)
CHLORIDE SERPL-SCNC: 106 MMOL/L (ref 96–112)
CHOLEST SERPL-MCNC: 175 MG/DL (ref 100–199)
CO2 SERPL-SCNC: 23 MMOL/L (ref 20–33)
CREAT SERPL-MCNC: 0.66 MG/DL (ref 0.5–1.4)
EOSINOPHIL # BLD AUTO: 0.15 K/UL (ref 0–0.51)
EOSINOPHIL NFR BLD: 2.5 % (ref 0–6.9)
ERYTHROCYTE [DISTWIDTH] IN BLOOD BY AUTOMATED COUNT: 45.8 FL (ref 35.9–50)
EST. AVERAGE GLUCOSE BLD GHB EST-MCNC: 105 MG/DL
FASTING STATUS PATIENT QL REPORTED: NORMAL
GFR SERPLBLD CREATININE-BSD FMLA CKD-EPI: 99 ML/MIN/1.73 M 2
GLOBULIN SER CALC-MCNC: 2.7 G/DL (ref 1.9–3.5)
GLUCOSE SERPL-MCNC: 114 MG/DL (ref 65–99)
HBA1C MFR BLD: 5.3 % (ref 4–5.6)
HCT VFR BLD AUTO: 43.6 % (ref 42–52)
HDLC SERPL-MCNC: 52 MG/DL
HGB BLD-MCNC: 15.1 G/DL (ref 14–18)
IMM GRANULOCYTES # BLD AUTO: 0.01 K/UL (ref 0–0.11)
IMM GRANULOCYTES NFR BLD AUTO: 0.2 % (ref 0–0.9)
LDLC SERPL CALC-MCNC: 108 MG/DL
LYMPHOCYTES # BLD AUTO: 1.67 K/UL (ref 1–4.8)
LYMPHOCYTES NFR BLD: 28.3 % (ref 22–41)
MCH RBC QN AUTO: 34.9 PG (ref 27–33)
MCHC RBC AUTO-ENTMCNC: 34.6 G/DL (ref 32.3–36.5)
MCV RBC AUTO: 100.7 FL (ref 81.4–97.8)
MONOCYTES # BLD AUTO: 0.71 K/UL (ref 0–0.85)
MONOCYTES NFR BLD AUTO: 12 % (ref 0–13.4)
NEUTROPHILS # BLD AUTO: 3.32 K/UL (ref 1.82–7.42)
NEUTROPHILS NFR BLD: 56.3 % (ref 44–72)
NRBC # BLD AUTO: 0 K/UL
NRBC BLD-RTO: 0 /100 WBC (ref 0–0.2)
PLATELET # BLD AUTO: 159 K/UL (ref 164–446)
PMV BLD AUTO: 10.6 FL (ref 9–12.9)
POTASSIUM SERPL-SCNC: 4.5 MMOL/L (ref 3.6–5.5)
PROT SERPL-MCNC: 6.6 G/DL (ref 6–8.2)
RBC # BLD AUTO: 4.33 M/UL (ref 4.7–6.1)
SODIUM SERPL-SCNC: 140 MMOL/L (ref 135–145)
TRIGL SERPL-MCNC: 73 MG/DL (ref 0–149)
TSH SERPL DL<=0.005 MIU/L-ACNC: 2.15 UIU/ML (ref 0.38–5.33)
WBC # BLD AUTO: 5.9 K/UL (ref 4.8–10.8)

## 2024-06-06 PROCEDURE — 80053 COMPREHEN METABOLIC PANEL: CPT

## 2024-06-06 PROCEDURE — 80061 LIPID PANEL: CPT

## 2024-06-06 PROCEDURE — 83036 HEMOGLOBIN GLYCOSYLATED A1C: CPT | Mod: GA

## 2024-06-06 PROCEDURE — 84443 ASSAY THYROID STIM HORMONE: CPT

## 2024-06-06 PROCEDURE — 85025 COMPLETE CBC W/AUTO DIFF WBC: CPT

## 2024-06-06 PROCEDURE — 36415 COLL VENOUS BLD VENIPUNCTURE: CPT | Mod: GA

## 2024-06-11 ENCOUNTER — OFFICE VISIT (OUTPATIENT)
Dept: MEDICAL GROUP | Facility: MEDICAL CENTER | Age: 74
End: 2024-06-11
Payer: MEDICARE

## 2024-06-11 VITALS
DIASTOLIC BLOOD PRESSURE: 62 MMHG | WEIGHT: 213 LBS | OXYGEN SATURATION: 96 % | TEMPERATURE: 97.7 F | RESPIRATION RATE: 16 BRPM | BODY MASS INDEX: 28.85 KG/M2 | HEIGHT: 72 IN | HEART RATE: 67 BPM | SYSTOLIC BLOOD PRESSURE: 118 MMHG

## 2024-06-11 DIAGNOSIS — I71.40 ABDOMINAL AORTIC ANEURYSM (AAA), UNSPECIFIED PART, UNSPECIFIED WHETHER RUPTURED (HCC): ICD-10-CM

## 2024-06-11 DIAGNOSIS — F10.21 ALCOHOLISM IN REMISSION (HCC): ICD-10-CM

## 2024-06-11 DIAGNOSIS — I71.40 ABDOMINAL AORTIC ANEURYSM (AAA) WITHOUT RUPTURE, UNSPECIFIED PART (HCC): ICD-10-CM

## 2024-06-11 DIAGNOSIS — E78.2 MIXED HYPERLIPIDEMIA: ICD-10-CM

## 2024-06-11 DIAGNOSIS — D69.6 THROMBOCYTOPENIA (HCC): ICD-10-CM

## 2024-06-11 DIAGNOSIS — D75.89 MACROCYTOSIS: ICD-10-CM

## 2024-06-11 PROCEDURE — 3074F SYST BP LT 130 MM HG: CPT | Performed by: PHYSICIAN ASSISTANT

## 2024-06-11 PROCEDURE — 3078F DIAST BP <80 MM HG: CPT | Performed by: PHYSICIAN ASSISTANT

## 2024-06-11 PROCEDURE — 99214 OFFICE O/P EST MOD 30 MIN: CPT | Performed by: PHYSICIAN ASSISTANT

## 2024-06-11 ASSESSMENT — FIBROSIS 4 INDEX: FIB4 SCORE: 2.45

## 2024-06-11 ASSESSMENT — PATIENT HEALTH QUESTIONNAIRE - PHQ9: CLINICAL INTERPRETATION OF PHQ2 SCORE: 0

## 2024-06-11 NOTE — PROGRESS NOTES
Subjective:     History of Present Illness  The patient presents for lab review.    The patient reports experiencing frequent bloating. He has abstained from alcohol for the past 6 months. He has not initiated naltrexone therapy due to concerns about potential sickness associated with alcohol consumption. His mood has shown improvement. He attributes his elevated glucose levels to the consumption of orange juice. His medication regimen includes Crestor, duloxetine, losartan, and doxazosin. He suspects that losartan and doxazosin may be contributing to his low blood pressure. He reports no issues with bowel movements or urination. He does not take any pain medication or anticoagulants.      Current medicines (including changes today)  Current Outpatient Medications   Medication Sig Dispense Refill    rosuvastatin (CRESTOR) 20 MG Tab Take 1 Tablet by mouth every evening. 90 Tablet 3    DULoxetine (CYMBALTA) 30 MG Cap DR Particles Take 1 Capsule by mouth every day. 90 Capsule 3    doxazosin (CARDURA) 4 MG Tab Take 1 Tablet by mouth every evening. 90 Tablet 3    losartan (COZAAR) 50 MG Tab Take 1 Tablet by mouth every day. 90 Tablet 2    B Complex Vitamins (B COMPLEX 1 PO) Take 1 Tablet by mouth every day.      Cholecalciferol (VITAMIN D3 PO) Take 1 Tablet by mouth every day.       No current facility-administered medications for this visit.     He  has a past medical history of High cholesterol, Hypertension, Numbness and tingling, and Pain (08/2018).    ROS   No chest pain, no shortness of breath, no abdominal pain  Positive ROS as per HPI.  All other systems reviewed and are negative.     Objective:     /62 (BP Location: Left arm, Patient Position: Sitting, BP Cuff Size: Adult)   Pulse 67   Temp 36.5 °C (97.7 °F)   Resp 16   Ht 1.829 m (6')   Wt 96.6 kg (213 lb)   SpO2 96%  Body mass index is 28.89 kg/m².   Physical Exam    Constitutional: Alert, no distress.  Skin: Warm, dry, good turgor, no rashes in  visible areas.  Eye: Equal, round and reactive, conjunctiva clear, lids normal.  ENMT: Lips without lesions, good dentition, oropharynx clear.  Neck: Trachea midline, no masses, no thyromegaly. No cervical or supraclavicular lymphadenopathy  Respiratory: Unlabored respiratory effort, lungs clear to auscultation, no wheezes, no ronchi.  Cardiovascular: Normal S1, S2, no murmur, no edema.  Abdomen: Soft, non-tender, no masses, no hepatosplenomegaly.  Psych: Alert and oriented x3, normal affect and mood.      Results  Laboratory Studies  White blood cells are normal. Platelets are slightly low at 159,000. MCV is 100.7 and MCH is 34.9. Hemoglobin and hematocrit are normal. RBC is slightly low at 4.33. Fasting glucose is 114. Total cholesterol is 75, HDL is 52, triglycerides are low. Hemoglobin A1c is 5.3%.        Assessment and Plan:   The f   Latest Reference Range & Units 06/06/24 09:36   WBC 4.8 - 10.8 K/uL 5.9   RBC 4.70 - 6.10 M/uL 4.33 (L)   Hemoglobin 14.0 - 18.0 g/dL 15.1   Hematocrit 42.0 - 52.0 % 43.6   MCV 81.4 - 97.8 fL 100.7 (H)   MCH 27.0 - 33.0 pg 34.9 (H)   MCHC 32.3 - 36.5 g/dL 34.6   RDW 35.9 - 50.0 fL 45.8   Platelet Count 164 - 446 K/uL 159 (L)   MPV 9.0 - 12.9 fL 10.6   Neutrophils-Polys 44.00 - 72.00 % 56.30   Neutrophils (Absolute) 1.82 - 7.42 K/uL 3.32   Lymphocytes 22.00 - 41.00 % 28.30   Lymphs (Absolute) 1.00 - 4.80 K/uL 1.67   Monocytes 0.00 - 13.40 % 12.00   Monos (Absolute) 0.00 - 0.85 K/uL 0.71   Eosinophils 0.00 - 6.90 % 2.50   Eos (Absolute) 0.00 - 0.51 K/uL 0.15   Basophils 0.00 - 1.80 % 0.70   Baso (Absolute) 0.00 - 0.12 K/uL 0.04   Immature Granulocytes 0.00 - 0.90 % 0.20   Immature Granulocytes (abs) 0.00 - 0.11 K/uL 0.01   Nucleated RBC 0.00 - 0.20 /100 WBC 0.00   NRBC (Absolute) K/uL 0.00   Sodium 135 - 145 mmol/L 140   Potassium 3.6 - 5.5 mmol/L 4.5   Chloride 96 - 112 mmol/L 106   Co2 20 - 33 mmol/L 23   Anion Gap 7.0 - 16.0  11.0   Glucose 65 - 99 mg/dL 114 (H)   Bun 8 - 22  mg/dL 12   Creatinine 0.50 - 1.40 mg/dL 0.66   GFR (CKD-EPI) >60 mL/min/1.73 m 2 99   Calcium 8.5 - 10.5 mg/dL 9.1   Correct Calcium 8.5 - 10.5 mg/dL 9.2   AST(SGOT) 12 - 45 U/L 20   ALT(SGPT) 2 - 50 U/L 14   Alkaline Phosphatase 30 - 99 U/L 72   Total Bilirubin 0.1 - 1.5 mg/dL 1.1   Albumin 3.2 - 4.9 g/dL 3.9   Total Protein 6.0 - 8.2 g/dL 6.6   Globulin 1.9 - 3.5 g/dL 2.7   A-G Ratio g/dL 1.4   Glycohemoglobin 4.0 - 5.6 % 5.3   Estim. Avg Glu mg/dL 105   Fasting Status  Fasting   Cholesterol,Tot 100 - 199 mg/dL 175   Triglycerides 0 - 149 mg/dL 73   HDL >=40 mg/dL 52   LDL <100 mg/dL 108 (H)   TSH 0.380 - 5.330 uIU/mL 2.150   (L): Data is abnormally low  (H): Data is abnormally highollowing treatment plan was discussed    Assessment & Plan        #1 alcoholism in remission continue not to drink patient's platelets are low at 159,000 and he has signs of macrocytosis.  B vitamins have been checked and are in normal range.  I am in order right upper quadrant ultrasound to check his liver and repeat CBC.  Patient has not had any alcohol for over 6 months    #2 history of neuropathy continue duloxetine he does not like the way he feels on gabapentin    #3 hyperlipidemia is much more well-managed and maintained on Crestor.  Continue Crestor 20 mg nightly I did review his lipid panel with him today    #4 history of abdominal aortic aneurysm.  He currently has 1 that is 3.8 cm in diameter.  I ordered ultrasound to measure it.  If it is increased in size by 0.5 mg will send to vascular surgery or if it is now outside the window for monitoring will also send      Balwinder was seen today for follow-up.    Diagnoses and all orders for this visit:    Thrombocytopenia (HCC)  -     CBC WITH DIFFERENTIAL; Future  -     US-RUQ; Future    Macrocytosis    Mixed hyperlipidemia    Alcoholism in remission (HCC)  -     CBC WITH DIFFERENTIAL; Future  -     US-RUQ; Future    Abdominal aortic aneurysm (AAA) without rupture, unspecified part  (HCC)  -     US-ABDOMINAL AORTA W/O DOPPLER; Future    Abdominal aortic aneurysm (AAA), unspecified part, unspecified whether ruptured (HCC)  -     US-ABDOMINAL AORTA W/O DOPPLER; Future          Anticipatory guidance discussed at length including regular daily exercise with a goal of 150 minutes a week.  Recommendation to eat the Mediterranean diet to decrease cardiovascular risk.  Encouraged avoiding high fructose corn syrup and other simple sugars to reduce risk of obesity and type 2 diabetes.    Follow Up: 12 weeks    Please note that this dictation was created using voice recognition software. I have made every reasonable attempt to correct obvious errors, but I expect that there are errors of grammar and possibly content that I did not discover before finalizing the note.      Attestation      Verbal consent was acquired by the patient to use [employer] ambient listening note generation during this visit Yes

## 2024-10-04 ENCOUNTER — HOSPITAL ENCOUNTER (OUTPATIENT)
Dept: RADIOLOGY | Facility: MEDICAL CENTER | Age: 74
End: 2024-10-04
Attending: PHYSICIAN ASSISTANT
Payer: MEDICARE

## 2024-10-04 DIAGNOSIS — D69.6 THROMBOCYTOPENIA (HCC): ICD-10-CM

## 2024-10-04 DIAGNOSIS — F10.21 ALCOHOLISM IN REMISSION (HCC): ICD-10-CM

## 2024-10-04 DIAGNOSIS — I71.40 ABDOMINAL AORTIC ANEURYSM (AAA), UNSPECIFIED PART, UNSPECIFIED WHETHER RUPTURED (HCC): ICD-10-CM

## 2024-10-04 DIAGNOSIS — I71.40 ABDOMINAL AORTIC ANEURYSM (AAA) WITHOUT RUPTURE, UNSPECIFIED PART (HCC): ICD-10-CM

## 2024-10-04 PROCEDURE — 76775 US EXAM ABDO BACK WALL LIM: CPT

## 2024-10-04 PROCEDURE — 76705 ECHO EXAM OF ABDOMEN: CPT

## 2024-10-21 ENCOUNTER — OFFICE VISIT (OUTPATIENT)
Dept: MEDICAL GROUP | Facility: MEDICAL CENTER | Age: 74
End: 2024-10-21
Payer: MEDICARE

## 2024-10-21 ENCOUNTER — RESEARCH ENCOUNTER (OUTPATIENT)
Dept: RESEARCH | Facility: MEDICAL CENTER | Age: 74
End: 2024-10-21

## 2024-10-21 VITALS
DIASTOLIC BLOOD PRESSURE: 62 MMHG | TEMPERATURE: 98.2 F | WEIGHT: 212.6 LBS | SYSTOLIC BLOOD PRESSURE: 122 MMHG | OXYGEN SATURATION: 94 % | HEART RATE: 56 BPM | BODY MASS INDEX: 27.28 KG/M2 | HEIGHT: 74 IN

## 2024-10-21 DIAGNOSIS — F10.21 ALCOHOLISM IN REMISSION (HCC): ICD-10-CM

## 2024-10-21 DIAGNOSIS — N40.0 BENIGN PROSTATIC HYPERPLASIA WITHOUT LOWER URINARY TRACT SYMPTOMS: ICD-10-CM

## 2024-10-21 DIAGNOSIS — I10 PRIMARY HYPERTENSION: ICD-10-CM

## 2024-10-21 DIAGNOSIS — I71.40 ABDOMINAL AORTIC ANEURYSM (AAA), UNSPECIFIED PART, UNSPECIFIED WHETHER RUPTURED (HCC): ICD-10-CM

## 2024-10-21 PROCEDURE — 3078F DIAST BP <80 MM HG: CPT | Performed by: PHYSICIAN ASSISTANT

## 2024-10-21 PROCEDURE — 3074F SYST BP LT 130 MM HG: CPT | Performed by: PHYSICIAN ASSISTANT

## 2024-10-21 PROCEDURE — 99214 OFFICE O/P EST MOD 30 MIN: CPT | Performed by: PHYSICIAN ASSISTANT

## 2024-10-21 ASSESSMENT — FIBROSIS 4 INDEX: FIB4 SCORE: 2.45

## 2024-10-21 NOTE — RESEARCH NOTE
Patient has been marked as Interested. The initial follow-up message, which includes the consent form link, has been sent to the patient.    Eligible Studies: HNP/MNASH    Active Referral: No

## 2025-05-14 DIAGNOSIS — N40.0 BENIGN PROSTATIC HYPERPLASIA WITHOUT LOWER URINARY TRACT SYMPTOMS: ICD-10-CM

## 2025-05-20 NOTE — TELEPHONE ENCOUNTER
Received request via: Pharmacy    Was the patient seen in the last year in this department? Yes    Does the patient have an active prescription (recently filled or refills available) for medication(s) requested? No    Pharmacy Name: [email or web address]     Does the patient have assisted Plus and need 100-day supply? (This applies to ALL medications) Patient does not have SCP

## 2025-05-21 RX ORDER — DOXAZOSIN 4 MG/1
4 TABLET ORAL EVERY EVENING
Qty: 90 TABLET | Refills: 0 | Status: SHIPPED | OUTPATIENT
Start: 2025-05-21

## 2025-06-12 ENCOUNTER — OFFICE VISIT (OUTPATIENT)
Dept: MEDICAL GROUP | Age: 75
End: 2025-06-12
Payer: MEDICARE

## 2025-06-12 VITALS
HEART RATE: 91 BPM | TEMPERATURE: 98.4 F | HEIGHT: 73 IN | DIASTOLIC BLOOD PRESSURE: 62 MMHG | OXYGEN SATURATION: 97 % | WEIGHT: 212 LBS | SYSTOLIC BLOOD PRESSURE: 146 MMHG | BODY MASS INDEX: 28.1 KG/M2

## 2025-06-12 DIAGNOSIS — R73.09 ELEVATED GLUCOSE: ICD-10-CM

## 2025-06-12 DIAGNOSIS — N40.0 BENIGN PROSTATIC HYPERPLASIA WITHOUT LOWER URINARY TRACT SYMPTOMS: ICD-10-CM

## 2025-06-12 DIAGNOSIS — R53.83 OTHER FATIGUE: ICD-10-CM

## 2025-06-12 DIAGNOSIS — R73.03 PREDIABETES: ICD-10-CM

## 2025-06-12 DIAGNOSIS — I10 PRIMARY HYPERTENSION: ICD-10-CM

## 2025-06-12 DIAGNOSIS — Z12.5 ENCOUNTER FOR SCREENING PROSTATE SPECIFIC ANTIGEN (PSA) MEASUREMENT: ICD-10-CM

## 2025-06-12 DIAGNOSIS — E55.9 VITAMIN D INSUFFICIENCY: ICD-10-CM

## 2025-06-12 DIAGNOSIS — G62.9 NEUROPATHY: ICD-10-CM

## 2025-06-12 DIAGNOSIS — D69.6 THROMBOCYTOPENIA (HCC): Primary | ICD-10-CM

## 2025-06-12 DIAGNOSIS — E78.2 MIXED HYPERLIPIDEMIA: ICD-10-CM

## 2025-06-12 DIAGNOSIS — F41.1 GAD (GENERALIZED ANXIETY DISORDER): ICD-10-CM

## 2025-06-12 DIAGNOSIS — F10.20 ALCOHOLISM (HCC): ICD-10-CM

## 2025-06-12 PROCEDURE — G2211 COMPLEX E/M VISIT ADD ON: HCPCS | Performed by: PHYSICIAN ASSISTANT

## 2025-06-12 PROCEDURE — 3077F SYST BP >= 140 MM HG: CPT | Performed by: PHYSICIAN ASSISTANT

## 2025-06-12 PROCEDURE — 3078F DIAST BP <80 MM HG: CPT | Performed by: PHYSICIAN ASSISTANT

## 2025-06-12 PROCEDURE — 99214 OFFICE O/P EST MOD 30 MIN: CPT | Performed by: PHYSICIAN ASSISTANT

## 2025-06-12 RX ORDER — LOSARTAN POTASSIUM 50 MG/1
50 TABLET ORAL DAILY
Qty: 90 TABLET | Refills: 0 | Status: SHIPPED | OUTPATIENT
Start: 2025-06-12

## 2025-06-12 RX ORDER — DULOXETIN HYDROCHLORIDE 30 MG/1
30 CAPSULE, DELAYED RELEASE ORAL DAILY
Qty: 90 CAPSULE | Refills: 0 | Status: SHIPPED | OUTPATIENT
Start: 2025-06-12

## 2025-06-12 RX ORDER — DOXAZOSIN 4 MG/1
4 TABLET ORAL EVERY EVENING
Qty: 90 TABLET | Refills: 0 | Status: SHIPPED | OUTPATIENT
Start: 2025-06-12

## 2025-06-12 RX ORDER — BUSPIRONE HYDROCHLORIDE 5 MG/1
5 TABLET ORAL 2 TIMES DAILY
Qty: 60 TABLET | Refills: 2 | Status: SHIPPED | OUTPATIENT
Start: 2025-06-12

## 2025-06-12 ASSESSMENT — FIBROSIS 4 INDEX: FIB4 SCORE: 2.49

## 2025-06-12 ASSESSMENT — PATIENT HEALTH QUESTIONNAIRE - PHQ9: CLINICAL INTERPRETATION OF PHQ2 SCORE: 0

## 2025-06-12 NOTE — PROGRESS NOTES
Subjective:     History of Present Illness  The patient presents for evaluation of alcohol use disorder, anxiety, neuropathy, hypertension, and urinary issues.    Alcohol consumption has increased recently, primarily champagne in the form of mimosas. He expresses concern about his drinking habits, noting a significant issue with alcohol three years ago that required hospitalization and detoxification. He was hospitalized for 10 to 12 days and then underwent detox in a medical facility for six weeks. Although the current situation is not as severe, he fears a potential recurrence of alcohol use. Current consumption is approximately two bottles per week, usually starting in the afternoon, and he does not drink every day. Tremors are present and occasionally improve with alcohol consumption. Anxiety is reported when abstaining from alcohol. He has previously tried naltrexone post-detox but found it unhelpful. Rehabilitation or AA meetings have not yet been considered.    He has been taking duloxetine for numbness and tingling in his legs, which appears to be effective. Gabapentin was previously tried for neuropathy but discontinued due to adverse effects. A medication believed to be a steroid was prescribed prior to a nerve test.    Rosuvastatin has been discontinued due to low cholesterol levels. He maintains a high-water intake and takes a daily multivitamin.    Refills for losartan and doxazosin are requested.    Urinary issues are present and will be discussed during the next visit. PSA levels have consistently been low.    SOCIAL HISTORY  He admits to drinking a couple of bottles of champagne a week, typically starting in the afternoon. He does not drink every single day.      Current medicines (including changes today)  Current Medications[1]  He  has a past medical history of High cholesterol, Hypertension, Numbness and tingling, and Pain (08/2018).    ROS   No chest pain, no shortness of breath, no abdominal  "pain  Positive ROS as per HPI.  All other systems reviewed and are negative.     Objective:     BP (!) 146/62 (BP Location: Left arm, Patient Position: Sitting, BP Cuff Size: Adult)   Pulse 91   Temp 36.9 °C (98.4 °F) (Temporal)   Ht 1.854 m (6' 1\")   Wt 96.2 kg (212 lb)   SpO2 97%  Body mass index is 27.97 kg/m².   Physical Exam    Constitutional: Alert, no distress.  Skin: Warm, dry, good turgor, no rashes in visible areas.  Eye: Equal, round and reactive, conjunctiva clear, lids normal.  ENMT: Lips without lesions, good dentition, oropharynx clear.  Neck: Trachea midline, no masses, no thyromegaly. No cervical or supraclavicular lymphadenopathy  Respiratory: Unlabored respiratory effort, lungs clear to auscultation, no wheezes, no ronchi.  Cardiovascular: Normal S1, S2, no murmur, no edema.  Abdomen: Soft, non-tender, no masses, no hepatosplenomegaly.  Psych: Alert and oriented x3, normal affect and mood.      Results  Labs   - Liver enzymes: Normal   - Complete Blood Count (CBC): Macrocytosis with low platelets   - Anemia panel: Anemia of chronic disease        Assessment and Plan:   The following treatment plan was discussed    Assessment & Plan  1. Alcohol Use Disorder.  - He reports drinking a couple of bottles of champagne per week, primarily in the afternoon. He has a history of hospitalization and detoxification for alcohol use 3 years ago.  - Increased tremors and anxiety when not drinking.  - The potential benefits of naltrexone and buspirone were discussed. A comprehensive set of labs will be ordered to assess liver function, cholesterol panel, blood glucose levels, thyroid function, vitamin D, B12, folate, and PSA.  - He is advised to abstain from alcohol until the lab results are available. If liver enzymes are normal, naltrexone will be initiated to help reduce alcohol cravings. Buspirone 5 mg twice daily will be started to manage anxiety, with a potential increase to 10 mg twice daily if " necessary. If there is no improvement in his condition, more intensive therapy may be considered.    2. Anxiety.  - He experiences anxiety when not drinking.  - Buspirone 5 mg twice daily will be prescribed to help manage anxiety symptoms.  - If there is no significant improvement after a week, the dosage may be increased to 10 mg twice daily.  - The effectiveness of buspirone will be monitored, and adjustments will be made as needed.    3. Neuropathy.  - He reports numbness and tingling in his legs.  - He is currently taking duloxetine, which he finds effective.  - He will continue with his current medication regimen.  - The effectiveness of duloxetine will be monitored.    4. Hypertension.  - He is on losartan and doxazosin for hypertension management.  - Refills for these medications will be provided.  - Blood pressure control will be monitored.    5. Urinary issues.  - He reports some issues with urination.  - A PSA test will be included in the comprehensive lab work to screen for any potential prostate issues.  - The results of the PSA test will be reviewed to assess for any abnormalities.  - Further evaluation of urinary issues will be conducted if necessary.    Follow-up  - The patient will follow up in 4 weeks.  1. Abdominal Aortic Aneurysm.  The abdominal aortic aneurysm remains stable with no significant changes. The ultrasound from 10/2024 confirms this stability, showing a slight reduction in size from 3.8 cm to 3.7 cm. Continued monitoring of the aneurysm is recommended.     2. Echogenic Liver.  The right upper quadrant ultrasound indicates echogenic liver, suggesting fatty infiltration or fibrosis, likely due to long-term alcohol consumption. Lab results, including AST, ALT, and ALK phos, are within normal limits. He is advised to abstain from alcohol to prevent further liver damage. Genetic testing for liver fibrosis is suggested for next year.     3. Hypertension.  Blood pressure is well-controlled  on losartan 50 mg daily and doxazosin. He is advised to continue his current medication regimen.     4. Medication Management.  He is not currently taking duloxetine but wishes to keep it available. He takes a multivitamin (Centrum senior) daily and his cholesterol medication intermittently. His last cholesterol test in June 2023 showed significant improvement, with LDL decreasing from 170 to 108. He is advised to take his cholesterol medication at least every other day for six weeks before his next test.     () Today's E/M visit is associated with medical care services that serve as the continuing focal point for all needed health care services and/or with medical care services that are part of ongoing care related to a patient's single, serious condition, or a complex condition: This includes furnishing services to patients on an ongoing basis that result in care that is personalized to the patient. The services result in a comprehensive, longitudinal, and continuous relationship with the patient and involve delivery of team-based care that is accessible, coordinated with other practitioners and providers, and integrated with the broader health care landscape.       ORDERS:  1. Benign prostatic hyperplasia without lower urinary tract symptoms      2. Neuropathy      3. Primary hypertension        Anticipatory guidance discussed at length including regular daily exercise with a goal of 150 minutes a week.  Recommendation to eat the Mediterranean diet to decrease cardiovascular risk.  Encouraged avoiding high fructose corn syrup and other simple sugars to reduce risk of obesity and type 2 diabetes.    Follow Up: 4 weeks    Please note that this dictation was created using voice recognition software. I have made every reasonable attempt to correct obvious errors, but I expect that there are errors of grammar and possibly content that I did not discover before finalizing the note.      Attestation      Verbal  consent was acquired by the patient to use [employer] ambient listening note generation during this visit Yes                  [1]   Current Outpatient Medications   Medication Sig Dispense Refill    doxazosin (CARDURA) 4 MG Tab TAKE 1 TABLET BY MOUTH EVERY EVENING 90 Tablet 0    rosuvastatin (CRESTOR) 20 MG Tab Take 1 Tablet by mouth every evening. 90 Tablet 3    DULoxetine (CYMBALTA) 30 MG Cap DR Particles Take 1 Capsule by mouth every day. 90 Capsule 3    losartan (COZAAR) 50 MG Tab Take 1 Tablet by mouth every day. 90 Tablet 2     No current facility-administered medications for this visit.

## 2025-06-13 ENCOUNTER — HOSPITAL ENCOUNTER (OUTPATIENT)
Dept: LAB | Facility: MEDICAL CENTER | Age: 75
End: 2025-06-13
Attending: PHYSICIAN ASSISTANT
Payer: MEDICARE

## 2025-06-13 DIAGNOSIS — G62.9 NEUROPATHY: ICD-10-CM

## 2025-06-13 DIAGNOSIS — R73.03 PREDIABETES: ICD-10-CM

## 2025-06-13 DIAGNOSIS — F10.20 ALCOHOLISM (HCC): ICD-10-CM

## 2025-06-13 DIAGNOSIS — R53.83 OTHER FATIGUE: ICD-10-CM

## 2025-06-13 DIAGNOSIS — Z12.5 ENCOUNTER FOR SCREENING PROSTATE SPECIFIC ANTIGEN (PSA) MEASUREMENT: ICD-10-CM

## 2025-06-13 DIAGNOSIS — R73.09 ELEVATED GLUCOSE: ICD-10-CM

## 2025-06-13 DIAGNOSIS — D69.6 THROMBOCYTOPENIA (HCC): ICD-10-CM

## 2025-06-13 DIAGNOSIS — I10 PRIMARY HYPERTENSION: ICD-10-CM

## 2025-06-13 LAB
BASOPHILS # BLD AUTO: 1.2 % (ref 0–1.8)
BASOPHILS # BLD: 0.07 K/UL (ref 0–0.12)
EOSINOPHIL # BLD AUTO: 0.17 K/UL (ref 0–0.51)
EOSINOPHIL NFR BLD: 2.8 % (ref 0–6.9)
ERYTHROCYTE [DISTWIDTH] IN BLOOD BY AUTOMATED COUNT: 44.3 FL (ref 35.9–50)
EST. AVERAGE GLUCOSE BLD GHB EST-MCNC: 114 MG/DL
HBA1C MFR BLD: 5.6 % (ref 4–5.6)
HCT VFR BLD AUTO: 46 % (ref 42–52)
HGB BLD-MCNC: 15.8 G/DL (ref 14–18)
IMM GRANULOCYTES # BLD AUTO: 0 K/UL (ref 0–0.11)
IMM GRANULOCYTES NFR BLD AUTO: 0 % (ref 0–0.9)
LYMPHOCYTES # BLD AUTO: 1.74 K/UL (ref 1–4.8)
LYMPHOCYTES NFR BLD: 28.7 % (ref 22–41)
MCH RBC QN AUTO: 35.1 PG (ref 27–33)
MCHC RBC AUTO-ENTMCNC: 34.3 G/DL (ref 32.3–36.5)
MCV RBC AUTO: 102.2 FL (ref 81.4–97.8)
MONOCYTES # BLD AUTO: 0.9 K/UL (ref 0–0.85)
MONOCYTES NFR BLD AUTO: 14.8 % (ref 0–13.4)
NEUTROPHILS # BLD AUTO: 3.19 K/UL (ref 1.82–7.42)
NEUTROPHILS NFR BLD: 52.5 % (ref 44–72)
NRBC # BLD AUTO: 0 K/UL
NRBC BLD-RTO: 0 /100 WBC (ref 0–0.2)
PLATELET # BLD AUTO: 147 K/UL (ref 164–446)
PMV BLD AUTO: 10 FL (ref 9–12.9)
RBC # BLD AUTO: 4.5 M/UL (ref 4.7–6.1)
WBC # BLD AUTO: 6.1 K/UL (ref 4.8–10.8)

## 2025-06-13 PROCEDURE — 82607 VITAMIN B-12: CPT

## 2025-06-13 PROCEDURE — 83036 HEMOGLOBIN GLYCOSYLATED A1C: CPT | Mod: GA

## 2025-06-13 PROCEDURE — 85025 COMPLETE CBC W/AUTO DIFF WBC: CPT

## 2025-06-13 PROCEDURE — 82306 VITAMIN D 25 HYDROXY: CPT

## 2025-06-13 PROCEDURE — 36415 COLL VENOUS BLD VENIPUNCTURE: CPT

## 2025-06-13 PROCEDURE — 80061 LIPID PANEL: CPT

## 2025-06-13 PROCEDURE — 80053 COMPREHEN METABOLIC PANEL: CPT

## 2025-06-13 PROCEDURE — 84443 ASSAY THYROID STIM HORMONE: CPT

## 2025-06-13 PROCEDURE — 82746 ASSAY OF FOLIC ACID SERUM: CPT

## 2025-06-13 PROCEDURE — 84153 ASSAY OF PSA TOTAL: CPT | Mod: GA

## 2025-06-14 LAB
25(OH)D3 SERPL-MCNC: 24 NG/ML (ref 30–100)
ALBUMIN SERPL BCP-MCNC: 4.1 G/DL (ref 3.2–4.9)
ALBUMIN/GLOB SERPL: 1.5 G/DL
ALP SERPL-CCNC: 85 U/L (ref 30–99)
ALT SERPL-CCNC: 20 U/L (ref 2–50)
ANION GAP SERPL CALC-SCNC: 10 MMOL/L (ref 7–16)
AST SERPL-CCNC: 30 U/L (ref 12–45)
BILIRUB SERPL-MCNC: 1.5 MG/DL (ref 0.1–1.5)
BUN SERPL-MCNC: 9 MG/DL (ref 8–22)
CALCIUM ALBUM COR SERPL-MCNC: 9 MG/DL (ref 8.5–10.5)
CALCIUM SERPL-MCNC: 9.1 MG/DL (ref 8.5–10.5)
CHLORIDE SERPL-SCNC: 100 MMOL/L (ref 96–112)
CHOLEST SERPL-MCNC: 217 MG/DL (ref 100–199)
CO2 SERPL-SCNC: 25 MMOL/L (ref 20–33)
CREAT SERPL-MCNC: 0.86 MG/DL (ref 0.5–1.4)
FASTING STATUS PATIENT QL REPORTED: NORMAL
FOLATE SERPL-MCNC: 18.1 NG/ML
GFR SERPLBLD CREATININE-BSD FMLA CKD-EPI: 91 ML/MIN/1.73 M 2
GLOBULIN SER CALC-MCNC: 2.8 G/DL (ref 1.9–3.5)
GLUCOSE SERPL-MCNC: 115 MG/DL (ref 65–99)
HDLC SERPL-MCNC: 52 MG/DL
LDLC SERPL CALC-MCNC: 136 MG/DL
POTASSIUM SERPL-SCNC: 3.9 MMOL/L (ref 3.6–5.5)
PROT SERPL-MCNC: 6.9 G/DL (ref 6–8.2)
PSA SERPL DL<=0.01 NG/ML-MCNC: 4.69 NG/ML (ref 0–4)
SODIUM SERPL-SCNC: 135 MMOL/L (ref 135–145)
TRIGL SERPL-MCNC: 145 MG/DL (ref 0–149)
TSH SERPL DL<=0.005 MIU/L-ACNC: 3.02 UIU/ML (ref 0.38–5.33)
VIT B12 SERPL-MCNC: 619 PG/ML (ref 211–911)

## 2025-06-19 ENCOUNTER — RESULTS FOLLOW-UP (OUTPATIENT)
Dept: MEDICAL GROUP | Age: 75
End: 2025-06-19

## 2025-08-07 ENCOUNTER — APPOINTMENT (OUTPATIENT)
Dept: MEDICAL GROUP | Age: 75
End: 2025-08-07
Payer: MEDICARE

## 2025-08-12 ENCOUNTER — OFFICE VISIT (OUTPATIENT)
Dept: MEDICAL GROUP | Age: 75
End: 2025-08-12
Payer: MEDICARE

## 2025-08-12 VITALS
SYSTOLIC BLOOD PRESSURE: 118 MMHG | HEART RATE: 77 BPM | WEIGHT: 218 LBS | HEIGHT: 73 IN | TEMPERATURE: 97 F | OXYGEN SATURATION: 93 % | BODY MASS INDEX: 28.89 KG/M2 | DIASTOLIC BLOOD PRESSURE: 66 MMHG

## 2025-08-12 DIAGNOSIS — I10 PRIMARY HYPERTENSION: ICD-10-CM

## 2025-08-12 DIAGNOSIS — N40.0 BENIGN PROSTATIC HYPERPLASIA WITHOUT LOWER URINARY TRACT SYMPTOMS: ICD-10-CM

## 2025-08-12 DIAGNOSIS — G62.9 NEUROPATHY: ICD-10-CM

## 2025-08-12 DIAGNOSIS — F10.20 ALCOHOLISM (HCC): Primary | ICD-10-CM

## 2025-08-12 DIAGNOSIS — E55.9 VITAMIN D INSUFFICIENCY: ICD-10-CM

## 2025-08-12 DIAGNOSIS — E78.2 MIXED HYPERLIPIDEMIA: ICD-10-CM

## 2025-08-12 DIAGNOSIS — I71.40 ABDOMINAL AORTIC ANEURYSM (AAA) WITHOUT RUPTURE, UNSPECIFIED PART (HCC): ICD-10-CM

## 2025-08-12 DIAGNOSIS — F41.1 GAD (GENERALIZED ANXIETY DISORDER): ICD-10-CM

## 2025-08-12 DIAGNOSIS — R97.20 ELEVATED PSA: ICD-10-CM

## 2025-08-12 DIAGNOSIS — D69.6 THROMBOCYTOPENIA (HCC): ICD-10-CM

## 2025-08-12 PROCEDURE — 99214 OFFICE O/P EST MOD 30 MIN: CPT | Performed by: PHYSICIAN ASSISTANT

## 2025-08-12 PROCEDURE — 3078F DIAST BP <80 MM HG: CPT | Performed by: PHYSICIAN ASSISTANT

## 2025-08-12 PROCEDURE — 3074F SYST BP LT 130 MM HG: CPT | Performed by: PHYSICIAN ASSISTANT

## 2025-08-12 PROCEDURE — G2211 COMPLEX E/M VISIT ADD ON: HCPCS | Performed by: PHYSICIAN ASSISTANT

## 2025-08-12 RX ORDER — DOXAZOSIN 4 MG/1
4 TABLET ORAL EVERY EVENING
Qty: 100 TABLET | Refills: 3 | Status: SHIPPED | OUTPATIENT
Start: 2025-08-12

## 2025-08-12 RX ORDER — DULOXETIN HYDROCHLORIDE 30 MG/1
30 CAPSULE, DELAYED RELEASE ORAL DAILY
Qty: 100 CAPSULE | Refills: 3 | Status: SHIPPED | OUTPATIENT
Start: 2025-08-12

## 2025-08-12 RX ORDER — BUSPIRONE HYDROCHLORIDE 5 MG/1
5 TABLET ORAL 2 TIMES DAILY
Qty: 180 TABLET | Refills: 3 | Status: SHIPPED | OUTPATIENT
Start: 2025-08-12

## 2025-08-12 RX ORDER — LOSARTAN POTASSIUM 50 MG/1
50 TABLET ORAL DAILY
Qty: 100 TABLET | Refills: 3 | Status: SHIPPED | OUTPATIENT
Start: 2025-08-12

## 2025-08-12 ASSESSMENT — FIBROSIS 4 INDEX: FIB4 SCORE: 3.38

## 2025-09-02 ENCOUNTER — APPOINTMENT (OUTPATIENT)
Dept: UROLOGY | Facility: MEDICAL CENTER | Age: 75
End: 2025-09-02
Payer: MEDICARE

## (undated) DEVICE — SET TUBE IRRIGATION (5/BX)

## (undated) DEVICE — SUTURE 0 VICRYL PLUS CT-1 - 8 X 18 INCH (12/BX)

## (undated) DEVICE — ARMREST CRADLE FOAM - (2PR/PK 12PR/CA)

## (undated) DEVICE — MIDAS LUBRICATOR DIFFUSER PACK (4EA/CA)

## (undated) DEVICE — PROTECTOR ULNA NERVE - (36PR/CA)

## (undated) DEVICE — CANISTER SUCTION 3000ML MECHANICAL FILTER AUTO SHUTOFF MEDI-VAC NONSTERILE LF DISP  (40EA/CA)

## (undated) DEVICE — GLOVE BIOGEL ECLIPSE  PF LATEX SIZE 6.5 (50PR/BX)

## (undated) DEVICE — SUCTION INSTRUMENT YANKAUER BULBOUS TIP W/O VENT (50EA/CA)

## (undated) DEVICE — TOOL DISSECT MATCH HEAD

## (undated) DEVICE — SUTURE 4-0 30CM STRATAFIX SPIRAL PS-2 (12EA/BX)

## (undated) DEVICE — ELECTRODE 850 FOAM ADHESIVE - HYDROGEL RADIOTRNSPRNT (50/PK)

## (undated) DEVICE — GLOVE BIOGEL PI INDICATOR SZ 7.0 SURGICAL PF LF - (50/BX 4BX/CA)

## (undated) DEVICE — GLOVE BIOGEL ECLIPSE PF LATEX SIZE 8.0  (50PR/BX)

## (undated) DEVICE — SET EXTENSION WITH 2 PORTS (48EA/CA) ***PART #2C8610 IS A SUBSTITUTE*****

## (undated) DEVICE — SPONGE GAUZESTER 4 X 4 4PLY - (128PK/CA)

## (undated) DEVICE — SYRINGE SAFETY 3 ML 18 GA X 1 1/2 BLUNT LL (100/BX 8BX/CA)

## (undated) DEVICE — KIT ANESTHESIA W/CIRCUIT & 3/LT BAG W/FILTER (20EA/CA)

## (undated) DEVICE — PACK JACKSON TABLE KIT W/OUT - HR (6EA/CA)

## (undated) DEVICE — MASK ANESTHESIA ADULT  - (100/CA)

## (undated) DEVICE — HEADREST PRONEVIEW LARGE - (10/CA)

## (undated) DEVICE — DRESSING TRANSPARENT FILM TEGADERM 4 X 4.75" (50EA/BX)"

## (undated) DEVICE — GLOVE, BIOGEL ECLIPSE, SZ 7.0, PF LTX (50/BX)

## (undated) DEVICE — ELECTRODE DUAL RETURN W/ CORD - (50/PK)

## (undated) DEVICE — PACK NEURO - (2EA/CA)

## (undated) DEVICE — NEPTUNE 4 PORT MANIFOLD - (20/PK)

## (undated) DEVICE — SENSOR SPO2 NEO LNCS ADHESIVE (20/BX) SEE USER NOTES

## (undated) DEVICE — BANDAGE ROLL STERILE BULKEE 4.5 IN X 4 YD (100EA/CA)

## (undated) DEVICE — GOWN WARMING STANDARD FLEX - (30/CA)

## (undated) DEVICE — DISSECT TOOL MIDAS REX 14BA75

## (undated) DEVICE — SODIUM CHL IRRIGATION 0.9% 1000ML (12EA/CA)

## (undated) DEVICE — DRAPE LARGE 3 QUARTER - (20/CA)

## (undated) DEVICE — KIT ROOM DECONTAMINATION

## (undated) DEVICE — SLEEVE, VASO, THIGH, MED

## (undated) DEVICE — KIT SURGIFLO W/OUT THROMBIN - (6EA/CA)

## (undated) DEVICE — SPONGE PEANUT - (5/PK 50PK/CA)

## (undated) DEVICE — LACTATED RINGERS INJ. 500 ML - (24EA/CA)

## (undated) DEVICE — SURGIFOAM (12X7) - (12EA/CA)

## (undated) DEVICE — GOWN SURGEONS X-LARGE - DISP. (30/CA)

## (undated) DEVICE — TUBING CLEARLINK DUO-VENT - C-FLO (48EA/CA)

## (undated) DEVICE — DRAPE LAPAROTOMY T SHEET - (12EA/CA)

## (undated) DEVICE — CHLORAPREP 26 ML APPLICATOR - ORANGE TINT(25/CA)

## (undated) DEVICE — SCALPEL BONE  20MM UNILAERL SERRATION LUMBAR

## (undated) DEVICE — STERI STRIP COMPOUND BENZOIN - TINCTURE 0.6ML WITH APPLICATOR (40EA/BX)

## (undated) DEVICE — DRAPE STRLE REG TOWEL 18X24 - (10/BX 4BX/CA)"

## (undated) DEVICE — SET LEADWIRE 5 LEAD BEDSIDE DISPOSABLE ECG (1SET OF 5/EA)

## (undated) DEVICE — SUCTION TIP STRAIGHT ARGYLE - 50EA/CA

## (undated) DEVICE — DRESSING LEUKOMED STERILE 11.75X4IN - (50/CA)

## (undated) DEVICE — SYRINGE 30 ML LL (56/BX)

## (undated) DEVICE — BOVIE BLADE COATED - (50/PK)

## (undated) DEVICE — SUTURE GENERAL

## (undated) DEVICE — GLOVE BIOGEL PI INDICATOR SZ 8.0 SURGICAL PF LF -(50/BX 4BX/CA)

## (undated) DEVICE — GLOVE BIOGEL INDICATOR SZ 7SURGICAL PF LTX - (50/BX 4BX/CA)

## (undated) DEVICE — SUTURE 2-0 VICRYL PLUS CT-1 - 8 X 18 INCH(12/BX)

## (undated) DEVICE — KIT EVACUATER 3 SPRING PVC LF 1/8 DRAIN SIZE (10EA/CA)"

## (undated) DEVICE — CLOSURE SKIN STRIP 1/2 X 4 IN - (STERI STRIP) (50/BX 4BX/CA)

## (undated) DEVICE — LACTATED RINGERS INJ 1000 ML - (14EA/CA 60CA/PF)

## (undated) DEVICE — SYRINGE SAFETY 10 ML 18 GA X 1 1/2 BLUNT LL (100/BX 4BX/CA)

## (undated) DEVICE — TUBING C&T SET FLYING LEADS DRAIN TUBING (10EA/BX)